# Patient Record
Sex: FEMALE | HISPANIC OR LATINO
[De-identification: names, ages, dates, MRNs, and addresses within clinical notes are randomized per-mention and may not be internally consistent; named-entity substitution may affect disease eponyms.]

---

## 2019-01-01 ENCOUNTER — EXTERNAL RECORD (OUTPATIENT)
Dept: HEALTH INFORMATION MANAGEMENT | Facility: OTHER | Age: 74
End: 2019-01-01

## 2019-07-10 ENCOUNTER — HOSPITAL (OUTPATIENT)
Dept: OTHER | Age: 74
End: 2019-07-10

## 2019-07-10 ENCOUNTER — EXTERNAL RECORD (OUTPATIENT)
Dept: CARDIOLOGY | Age: 74
End: 2019-07-10

## 2019-07-10 ENCOUNTER — DIAGNOSTIC TRANS (OUTPATIENT)
Dept: OTHER | Age: 74
End: 2019-07-10

## 2019-07-10 LAB
ALBUMIN SERPL-MCNC: 3.4 G/DL (ref 3.6–5.1)
ALP SERPL-CCNC: 119 UNITS/L (ref 45–117)
ALT SERPL-CCNC: 18 UNITS/L
AMORPH SED URNS QL MICRO: ABNORMAL
ANALYZER ANC (IANC): ABNORMAL
ANION GAP SERPL CALC-SCNC: 12 MMOL/L (ref 10–20)
APPEARANCE UR: ABNORMAL
APPEARANCE UR: CLEAR
AST SERPL-CCNC: 18 UNITS/L
BACTERIA #/AREA URNS HPF: ABNORMAL /HPF
BASOPHILS # BLD: 0 K/MCL (ref 0–0.3)
BASOPHILS NFR BLD: 1 %
BILIRUB CONJ SERPL-MCNC: 0.1 MG/DL (ref 0–0.2)
BILIRUB SERPL-MCNC: 0.2 MG/DL (ref 0.2–1)
BILIRUB UR QL STRIP: NEGATIVE
BILIRUB UR QL: NEGATIVE
BUN SERPL-MCNC: 24 MG/DL (ref 6–20)
BUN/CREAT SERPL: 36 (ref 7–25)
CALCIUM SERPL-MCNC: 8.9 MG/DL (ref 8.4–10.2)
CAOX CRY URNS QL MICRO: ABNORMAL
CHLORIDE SERPL-SCNC: 107 MMOL/L (ref 98–107)
CO2 SERPL-SCNC: 27 MMOL/L (ref 21–32)
COLOR UR: YELLOW
COLOR UR: YELLOW
CREAT SERPL-MCNC: 0.66 MG/DL (ref 0.51–0.95)
CRP SERPL-MCNC: 0.6 MG/DL
DIFFERENTIAL METHOD BLD: ABNORMAL
EOSINOPHIL # BLD: 0.1 K/MCL (ref 0.1–0.5)
EOSINOPHIL NFR BLD: 1 %
EPITH CASTS #/AREA URNS LPF: ABNORMAL /[LPF]
ERYTHROCYTE [DISTWIDTH] IN BLOOD: 17.9 % (ref 11–15)
ERYTHROCYTE [SEDIMENTATION RATE] IN BLOOD BY WESTERGREN METHOD: 35 MM/HR (ref 0–20)
FATTY CASTS #/AREA URNS LPF: ABNORMAL /[LPF]
FERRITIN SERPL-MCNC: 5 NG/ML (ref 8–252)
GLUCOSE SERPL-MCNC: 118 MG/DL (ref 65–99)
GLUCOSE UR STRIP-MCNC: NEGATIVE MG/DL
GLUCOSE UR-MCNC: NEGATIVE MG/DL
GRAN CASTS #/AREA URNS LPF: ABNORMAL /[LPF]
HCT VFR BLD CALC: 27.9 % (ref 36–46.5)
HEMOCCULT STL QL: NEGATIVE
HGB BLD-MCNC: 8 G/DL (ref 12–15.5)
HGB UR QL: NEGATIVE
HYALINE CASTS #/AREA URNS LPF: ABNORMAL /[LPF]
IMM GRANULOCYTES # BLD AUTO: 0 K/MCL (ref 0–0.2)
IMM GRANULOCYTES NFR BLD: 0 %
IMM RETICS NFR: 13.2 % (ref 1.5–16)
IRON SATN MFR SERPL: 3 % (ref 15–45)
IRON SERPL-MCNC: 13 MCG/DL (ref 50–170)
KETONES UR STRIP-MCNC: NEGATIVE MG/DL
KETONES UR-MCNC: NEGATIVE MG/DL
LEUKOCYTE ESTERASE UR QL STRIP: ABNORMAL
LEUKOCYTE ESTERASE UR QL STRIP: NEGATIVE
LYMPHOCYTES # BLD: 1.2 K/MCL (ref 1–4)
LYMPHOCYTES NFR BLD: 19 %
MCH RBC QN AUTO: 21.1 PG (ref 26–34)
MCHC RBC AUTO-ENTMCNC: 28.7 G/DL (ref 32–36.5)
MCV RBC AUTO: 73.6 FL (ref 78–100)
MICROSCOPIC (MT): ABNORMAL
MIXED CELL CASTS #/AREA URNS LPF: ABNORMAL /[LPF]
MONOCYTES # BLD: 0.4 K/MCL (ref 0.3–0.9)
MONOCYTES NFR BLD: 6 %
MUCOUS THREADS URNS QL MICRO: ABNORMAL
NEUTROPHILS # BLD: 4.6 K/MCL (ref 1.8–7.7)
NEUTROPHILS NFR BLD: 73 %
NEUTS SEG NFR BLD: ABNORMAL %
NITRITE UR QL STRIP: POSITIVE
NITRITE UR QL: ABNORMAL
NITRITE UR QL: POSITIVE
NRBC (NRBCRE): 0 /100 WBC
PH UR STRIP: 5.5 UNITS (ref 5–7)
PH UR: 5 UNITS (ref 5–7)
PLATELET # BLD: 265 K/MCL (ref 140–450)
POTASSIUM SERPL-SCNC: 4.2 MMOL/L (ref 3.4–5.1)
PROT SERPL-MCNC: 7.5 G/DL (ref 6.4–8.2)
PROT UR QL: NEGATIVE MG/DL
PROT UR STRIP-MCNC: NEGATIVE MG/DL
RBC # BLD: 3.79 MIL/MCL (ref 4–5.2)
RBC #/AREA URNS HPF: ABNORMAL /HPF (ref 0–2)
RBC CASTS #/AREA URNS LPF: ABNORMAL /[LPF]
RENAL EPI CELLS #/AREA URNS HPF: ABNORMAL /[HPF]
RETICS #: 60 K/MCL (ref 10–120)
RETICS/RBC NFR: 1.6 % (ref 0.3–2.5)
SODIUM SERPL-SCNC: 142 MMOL/L (ref 135–145)
SP GR UR STRIP: 1.02 (ref 1–1.03)
SP GR UR: 1.01 (ref 1–1.03)
SPECIMEN SOURCE: ABNORMAL
SPERM URNS QL MICRO: ABNORMAL
SQUAMOUS #/AREA URNS HPF: ABNORMAL /HPF (ref 0–5)
T VAGINALIS URNS QL MICRO: ABNORMAL
TIBC SERPL-MCNC: 391 MCG/DL (ref 250–450)
TRI-PHOS CRY URNS QL MICRO: ABNORMAL
TROPONIN I SERPL HS-MCNC: <0.02 NG/ML
URATE CRY URNS QL MICRO: ABNORMAL
URNS CMNT MICRO: ABNORMAL
UROBILINOGEN UR QL: 0.2 MG/DL (ref 0–1)
UROBILINOGEN UR STRIP-MCNC: 0.2 MG/DL (ref 0–1)
WAXY CASTS #/AREA URNS LPF: ABNORMAL /[LPF]
WBC # BLD: 6.2 K/MCL (ref 4.2–11)
WBC #/AREA URNS HPF: ABNORMAL /HPF (ref 0–5)
WBC CASTS #/AREA URNS LPF: ABNORMAL /[LPF]
YEAST HYPHAE URNS QL MICRO: ABNORMAL
YEAST URNS QL MICRO: ABNORMAL

## 2019-07-10 PROCEDURE — 99220 INITIAL OBSERVATION CARE,LEVL III: CPT | Performed by: INTERNAL MEDICINE

## 2019-07-10 PROCEDURE — 99285 EMERGENCY DEPT VISIT HI MDM: CPT | Performed by: EMERGENCY MEDICINE

## 2019-07-11 LAB
ANALYZER ANC (IANC): ABNORMAL
ANION GAP SERPL CALC-SCNC: 15 MMOL/L (ref 10–20)
BASOPHILS # BLD: 0 K/MCL (ref 0–0.3)
BASOPHILS NFR BLD: 0 %
BUN SERPL-MCNC: 18 MG/DL (ref 6–20)
BUN/CREAT SERPL: 38 (ref 7–25)
CALCIUM SERPL-MCNC: 8.4 MG/DL (ref 8.4–10.2)
CHLORIDE SERPL-SCNC: 108 MMOL/L (ref 98–107)
CO2 SERPL-SCNC: 23 MMOL/L (ref 21–32)
CREAT SERPL-MCNC: 0.48 MG/DL (ref 0.51–0.95)
DIFFERENTIAL METHOD BLD: ABNORMAL
EOSINOPHIL # BLD: 0.1 K/MCL (ref 0.1–0.5)
EOSINOPHIL NFR BLD: 1 %
ERYTHROCYTE [DISTWIDTH] IN BLOOD: 17.7 % (ref 11–15)
FOLATE SERPL-MCNC: 6.3 NG/ML
GLUCOSE SERPL-MCNC: 99 MG/DL (ref 65–99)
HCT VFR BLD CALC: 26.5 % (ref 36–46.5)
HGB BLD-MCNC: 7.6 G/DL (ref 12–15.5)
IMM GRANULOCYTES # BLD AUTO: 0 K/MCL (ref 0–0.2)
IMM GRANULOCYTES NFR BLD: 0 %
LYMPHOCYTES # BLD: 1.3 K/MCL (ref 1–4)
LYMPHOCYTES NFR BLD: 17 %
MCH RBC QN AUTO: 21.1 PG (ref 26–34)
MCHC RBC AUTO-ENTMCNC: 28.7 G/DL (ref 32–36.5)
MCV RBC AUTO: 73.4 FL (ref 78–100)
MONOCYTES # BLD: 0.5 K/MCL (ref 0.3–0.9)
MONOCYTES NFR BLD: 6 %
NEUTROPHILS # BLD: 5.7 K/MCL (ref 1.8–7.7)
NEUTROPHILS NFR BLD: 76 %
NEUTS SEG NFR BLD: ABNORMAL %
NRBC (NRBCRE): 0 /100 WBC
PLATELET # BLD: 232 K/MCL (ref 140–450)
POTASSIUM SERPL-SCNC: 3.8 MMOL/L (ref 3.4–5.1)
RBC # BLD: 3.61 MIL/MCL (ref 4–5.2)
SODIUM SERPL-SCNC: 142 MMOL/L (ref 135–145)
VIT B12 SERPL-MCNC: 313 PG/ML (ref 211–911)
WBC # BLD: 7.6 K/MCL (ref 4.2–11)

## 2019-07-11 PROCEDURE — 99233 SBSQ HOSP IP/OBS HIGH 50: CPT | Performed by: INTERNAL MEDICINE

## 2019-07-12 LAB
ANALYZER ANC (IANC): ABNORMAL
ANION GAP SERPL CALC-SCNC: 13 MMOL/L (ref 10–20)
BASOPHILS # BLD: 0 K/MCL (ref 0–0.3)
BASOPHILS NFR BLD: 0 %
BUN SERPL-MCNC: 20 MG/DL (ref 6–20)
BUN/CREAT SERPL: 31 (ref 7–25)
CALCIUM SERPL-MCNC: 8.7 MG/DL (ref 8.4–10.2)
CHLORIDE SERPL-SCNC: 104 MMOL/L (ref 98–107)
CO2 SERPL-SCNC: 27 MMOL/L (ref 21–32)
CREAT SERPL-MCNC: 0.64 MG/DL (ref 0.51–0.95)
DIFFERENTIAL METHOD BLD: ABNORMAL
EOSINOPHIL # BLD: 0.1 K/MCL (ref 0.1–0.5)
EOSINOPHIL NFR BLD: 1 %
ERYTHROCYTE [DISTWIDTH] IN BLOOD: 17.8 % (ref 11–15)
GLUCOSE SERPL-MCNC: 103 MG/DL (ref 65–99)
HCT VFR BLD CALC: 26.9 % (ref 36–46.5)
HGB BLD-MCNC: 7.6 G/DL (ref 12–15.5)
IMM GRANULOCYTES # BLD AUTO: 0 K/MCL (ref 0–0.2)
IMM GRANULOCYTES NFR BLD: 0 %
LYMPHOCYTES # BLD: 1.7 K/MCL (ref 1–4)
LYMPHOCYTES NFR BLD: 24 %
MCH RBC QN AUTO: 21.2 PG (ref 26–34)
MCHC RBC AUTO-ENTMCNC: 28.3 G/DL (ref 32–36.5)
MCV RBC AUTO: 74.9 FL (ref 78–100)
MONOCYTES # BLD: 0.6 K/MCL (ref 0.3–0.9)
MONOCYTES NFR BLD: 9 %
NEUTROPHILS # BLD: 4.6 K/MCL (ref 1.8–7.7)
NEUTROPHILS NFR BLD: 66 %
NEUTS SEG NFR BLD: ABNORMAL %
NRBC (NRBCRE): 0 /100 WBC
PLATELET # BLD: 232 K/MCL (ref 140–450)
POTASSIUM SERPL-SCNC: 4.2 MMOL/L (ref 3.4–5.1)
RBC # BLD: 3.59 MIL/MCL (ref 4–5.2)
SODIUM SERPL-SCNC: 140 MMOL/L (ref 135–145)
WBC # BLD: 7 K/MCL (ref 4.2–11)

## 2019-07-12 PROCEDURE — 99233 SBSQ HOSP IP/OBS HIGH 50: CPT | Performed by: INTERNAL MEDICINE

## 2019-07-13 LAB
BACTERIA UR CULT: ABNORMAL
ORGANISM: ABNORMAL

## 2019-07-13 PROCEDURE — 99232 SBSQ HOSP IP/OBS MODERATE 35: CPT | Performed by: INTERNAL MEDICINE

## 2019-07-14 PROCEDURE — 99239 HOSP IP/OBS DSCHRG MGMT >30: CPT | Performed by: INTERNAL MEDICINE

## 2019-07-15 LAB
ANER/AEROBE CUL/SMR (AANC) HL: ABNORMAL
ORGANISM: ABNORMAL
ORGANISM: ABNORMAL

## 2019-07-23 ENCOUNTER — EXTERNAL RECORD (OUTPATIENT)
Dept: HEALTH INFORMATION MANAGEMENT | Facility: OTHER | Age: 74
End: 2019-07-23

## 2019-07-30 ENCOUNTER — EXTERNAL RECORD (OUTPATIENT)
Dept: HEALTH INFORMATION MANAGEMENT | Facility: OTHER | Age: 74
End: 2019-07-30

## 2019-08-05 ENCOUNTER — OFFICE VISIT (OUTPATIENT)
Dept: WOUND CARE | Age: 74
End: 2019-08-05

## 2019-08-05 ENCOUNTER — HOSPITAL (OUTPATIENT)
Dept: OTHER | Age: 74
End: 2019-08-05
Attending: SURGERY

## 2019-08-05 DIAGNOSIS — S81.801D OPEN WOUND OF RIGHT LOWER EXTREMITY, SUBSEQUENT ENCOUNTER: ICD-10-CM

## 2019-08-05 DIAGNOSIS — I73.9 PERIPHERAL ARTERIAL DISEASE (CMD): Primary | ICD-10-CM

## 2019-08-05 PROCEDURE — 99203 OFFICE O/P NEW LOW 30 MIN: CPT | Performed by: SURGERY

## 2019-08-08 ENCOUNTER — OFFICE VISIT (OUTPATIENT)
Dept: CARDIOLOGY | Age: 74
End: 2019-08-08

## 2019-08-08 VITALS
HEIGHT: 59 IN | HEART RATE: 51 BPM | SYSTOLIC BLOOD PRESSURE: 134 MMHG | BODY MASS INDEX: 28.89 KG/M2 | DIASTOLIC BLOOD PRESSURE: 60 MMHG | WEIGHT: 143.3 LBS

## 2019-08-08 DIAGNOSIS — R07.89 ATYPICAL CHEST PAIN: Primary | ICD-10-CM

## 2019-08-08 DIAGNOSIS — I73.9 PAD (PERIPHERAL ARTERY DISEASE) (CMD): ICD-10-CM

## 2019-08-08 DIAGNOSIS — E78.2 MIXED HYPERLIPIDEMIA: ICD-10-CM

## 2019-08-08 DIAGNOSIS — I10 ESSENTIAL HYPERTENSION: ICD-10-CM

## 2019-08-08 PROBLEM — Z86.73 H/O: CVA (CEREBROVASCULAR ACCIDENT): Status: ACTIVE | Noted: 2019-08-08

## 2019-08-08 PROCEDURE — 99203 OFFICE O/P NEW LOW 30 MIN: CPT | Performed by: INTERNAL MEDICINE

## 2019-08-08 PROCEDURE — 3078F DIAST BP <80 MM HG: CPT | Performed by: INTERNAL MEDICINE

## 2019-08-08 PROCEDURE — 3075F SYST BP GE 130 - 139MM HG: CPT | Performed by: INTERNAL MEDICINE

## 2019-08-08 SDOH — HEALTH STABILITY: MENTAL HEALTH: HOW OFTEN DO YOU HAVE A DRINK CONTAINING ALCOHOL?: NEVER

## 2019-08-19 ENCOUNTER — OFFICE VISIT (OUTPATIENT)
Dept: WOUND CARE | Age: 74
End: 2019-08-19

## 2019-08-19 DIAGNOSIS — I73.9 PERIPHERAL ARTERIAL DISEASE (CMD): Primary | ICD-10-CM

## 2019-08-19 PROCEDURE — 99213 OFFICE O/P EST LOW 20 MIN: CPT | Performed by: SURGERY

## 2019-08-19 RX ORDER — TRAMADOL HYDROCHLORIDE 50 MG/1
50 TABLET ORAL EVERY 6 HOURS PRN
Status: SHIPPED | OUTPATIENT
Start: 2019-08-19 | End: 2019-09-02

## 2019-08-21 ENCOUNTER — OFFICE VISIT (OUTPATIENT)
Dept: WOUND CARE | Age: 74
End: 2019-08-21

## 2019-08-21 DIAGNOSIS — S81.801D OPEN WOUND OF RIGHT LOWER LEG, SUBSEQUENT ENCOUNTER: Primary | ICD-10-CM

## 2019-08-21 DIAGNOSIS — I73.9 PAD (PERIPHERAL ARTERY DISEASE) (CMD): ICD-10-CM

## 2019-08-21 PROBLEM — S81.801A OPEN WOUND OF RIGHT LOWER LEG: Status: ACTIVE | Noted: 2019-08-21

## 2019-08-21 PROCEDURE — X1094 NO CHARGE VISIT: HCPCS | Performed by: PHYSICAL THERAPIST

## 2019-08-23 ENCOUNTER — TELEPHONE (OUTPATIENT)
Dept: SURGERY | Age: 74
End: 2019-08-23

## 2019-08-26 ENCOUNTER — TELEPHONE (OUTPATIENT)
Dept: CARDIOLOGY | Age: 74
End: 2019-08-26

## 2019-08-26 ENCOUNTER — OFFICE VISIT (OUTPATIENT)
Dept: WOUND CARE | Age: 74
End: 2019-08-26

## 2019-08-26 DIAGNOSIS — S81.801D OPEN WOUND OF RIGHT LOWER EXTREMITY, SUBSEQUENT ENCOUNTER: ICD-10-CM

## 2019-08-26 DIAGNOSIS — I73.9 PERIPHERAL ARTERIAL DISEASE (CMD): Primary | ICD-10-CM

## 2019-08-26 PROCEDURE — 99213 OFFICE O/P EST LOW 20 MIN: CPT | Performed by: SURGERY

## 2019-08-27 ENCOUNTER — HOSPITAL (OUTPATIENT)
Dept: OTHER | Age: 74
End: 2019-08-27

## 2019-08-27 ENCOUNTER — DIAGNOSTIC TRANS (OUTPATIENT)
Dept: OTHER | Age: 74
End: 2019-08-27

## 2019-08-27 LAB
ANION GAP SERPL CALC-SCNC: 14 MMOL/L (ref 10–20)
APTT PPP: 117 SEC (ref 22–32)
APTT PPP: 30 SEC (ref 22–32)
APTT PPP: ABNORMAL S
APTT PPP: NORMAL S
APTT PPP: NORMAL S
BUN SERPL-MCNC: 15 MG/DL (ref 6–20)
BUN/CREAT SERPL: 33 (ref 7–25)
CALCIUM SERPL-MCNC: 8.5 MG/DL (ref 8.4–10.2)
CHLORIDE SERPL-SCNC: 107 MMOL/L (ref 98–107)
CO2 SERPL-SCNC: 24 MMOL/L (ref 21–32)
CREAT SERPL-MCNC: 0.45 MG/DL (ref 0.51–0.95)
GLUCOSE SERPL-MCNC: 121 MG/DL (ref 65–99)
HCT VFR BLD CALC: 22.3 % (ref 36–46.5)
HCT VFR BLD CALC: 26.6 % (ref 36–46.5)
HCT VFR BLD CALC: 29.3 % (ref 36–46.5)
HCT VFR BLD CALC: 38.2 % (ref 36–46.5)
HGB BLD-MCNC: 12.1 G/DL (ref 12–15.5)
HGB BLD-MCNC: 6.7 G/DL (ref 12–15.5)
HGB BLD-MCNC: 8.1 G/DL (ref 12–15.5)
HGB BLD-MCNC: 9 G/DL (ref 12–15.5)
HGB BLD-MCNC: ABNORMAL G/DL
HGB BLD-MCNC: ABNORMAL G/DL
INR PPP: 1.1
INR PPP: 1.2
INR PPP: ABNORMAL
INR PPP: NORMAL
POTASSIUM SERPL-SCNC: 4.7 MMOL/L (ref 3.4–5.1)
POTASSIUM SERPL-SCNC: ABNORMAL MMOL/L
PROTHROMBIN TIME (PRT2): ABNORMAL
PROTHROMBIN TIME (PRT2): NORMAL
PROTHROMBIN TIME: 11.4 SEC (ref 9.7–11.8)
PROTHROMBIN TIME: 12.2 SEC (ref 9.7–11.8)
SODIUM SERPL-SCNC: 140 MMOL/L (ref 135–145)
TROPONIN I SERPL HS-MCNC: 0.03 NG/ML
TROPONIN I SERPL HS-MCNC: 0.03 NG/ML

## 2019-08-27 PROCEDURE — 11043 DBRDMT MUSC&/FSCA 1ST 20/<: CPT | Performed by: SURGERY

## 2019-08-27 PROCEDURE — 37232 REVASCULARIZATION ENDOVASC TIBIAL/PERONEAL UNIL EA ADDL VESSEL W ANGIO: CPT | Performed by: SURGERY

## 2019-08-27 PROCEDURE — 99222 1ST HOSP IP/OBS MODERATE 55: CPT | Performed by: INTERNAL MEDICINE

## 2019-08-27 PROCEDURE — 75710 ARTERY X-RAYS ARM/LEG: CPT | Performed by: SURGERY

## 2019-08-27 PROCEDURE — 75625 CONTRAST EXAM ABDOMINL AORTA: CPT | Performed by: SURGERY

## 2019-08-27 PROCEDURE — 99223 1ST HOSP IP/OBS HIGH 75: CPT | Performed by: SURGERY

## 2019-08-27 PROCEDURE — 37228 REVASCULARIZATION ENDOVASC TIBIAL/PERONEAL UNIL W ANGIOPLASTY: CPT | Performed by: SURGERY

## 2019-08-28 ENCOUNTER — MED INFO FORMS (OUTPATIENT)
Dept: CARDIOLOGY | Age: 74
End: 2019-08-28

## 2019-08-28 LAB
ANALYZER ANC (IANC): ABNORMAL
ANION GAP SERPL CALC-SCNC: 14 MMOL/L (ref 10–20)
BASOPHILS # BLD: 0 K/MCL (ref 0–0.3)
BASOPHILS NFR BLD: 0 %
BUN SERPL-MCNC: 18 MG/DL (ref 6–20)
BUN/CREAT SERPL: 37 (ref 7–25)
CALCIUM SERPL-MCNC: 8.4 MG/DL (ref 8.4–10.2)
CHLORIDE SERPL-SCNC: 109 MMOL/L (ref 98–107)
CO2 SERPL-SCNC: 22 MMOL/L (ref 21–32)
CREAT SERPL-MCNC: 0.49 MG/DL (ref 0.51–0.95)
DIFFERENTIAL METHOD BLD: ABNORMAL
EOSINOPHIL # BLD: 0 K/MCL (ref 0.1–0.5)
EOSINOPHIL NFR BLD: 0 %
ERYTHROCYTE [DISTWIDTH] IN BLOOD: 19.7 % (ref 11–15)
GLUCOSE SERPL-MCNC: 130 MG/DL (ref 65–99)
HCT VFR BLD CALC: 28.9 % (ref 36–46.5)
HGB BLD-MCNC: 9.5 G/DL (ref 12–15.5)
IMM GRANULOCYTES # BLD AUTO: 0 K/MCL (ref 0–0.2)
IMM GRANULOCYTES NFR BLD: 0 %
LYMPHOCYTES # BLD: 1.4 K/MCL (ref 1–4)
LYMPHOCYTES NFR BLD: 12 %
MCH RBC QN AUTO: 28.1 PG (ref 26–34)
MCHC RBC AUTO-ENTMCNC: 32.9 G/DL (ref 32–36.5)
MCV RBC AUTO: 85.5 FL (ref 78–100)
MONOCYTES # BLD: 0.9 K/MCL (ref 0.3–0.9)
MONOCYTES NFR BLD: 8 %
MRSA DNA SPEC QL NAA+PROBE: NORMAL
MRSA DNA SPEC QL NAA+PROBE: NORMAL
MRSA DNA SPEC QL NAA+PROBE: NOT DETECTED
NEUTROPHILS # BLD: 9.3 K/MCL (ref 1.8–7.7)
NEUTROPHILS NFR BLD: 80 %
NEUTS SEG NFR BLD: ABNORMAL %
NRBC (NRBCRE): 0 /100 WBC
PLATELET # BLD: 188 K/MCL (ref 140–450)
POTASSIUM SERPL-SCNC: 4.3 MMOL/L (ref 3.4–5.1)
RBC # BLD: 3.38 MIL/MCL (ref 4–5.2)
SODIUM SERPL-SCNC: 141 MMOL/L (ref 135–145)
SPECIMEN SOURCE: NORMAL
TROPONIN I SERPL HS-MCNC: 0.04 NG/ML
WBC # BLD: 11.5 K/MCL (ref 4.2–11)

## 2019-08-28 PROCEDURE — 99232 SBSQ HOSP IP/OBS MODERATE 35: CPT | Performed by: SURGERY

## 2019-08-28 PROCEDURE — 99231 SBSQ HOSP IP/OBS SF/LOW 25: CPT | Performed by: INTERNAL MEDICINE

## 2019-08-29 LAB
ANALYZER ANC (IANC): ABNORMAL
ANION GAP SERPL CALC-SCNC: 14 MMOL/L (ref 10–20)
BASOPHILS # BLD: 0 K/MCL (ref 0–0.3)
BASOPHILS NFR BLD: 0 %
BUN SERPL-MCNC: 13 MG/DL (ref 6–20)
BUN/CREAT SERPL: 29 (ref 7–25)
CALCIUM SERPL-MCNC: 8.6 MG/DL (ref 8.4–10.2)
CHLORIDE SERPL-SCNC: 106 MMOL/L (ref 98–107)
CO2 SERPL-SCNC: 24 MMOL/L (ref 21–32)
CREAT SERPL-MCNC: 0.45 MG/DL (ref 0.51–0.95)
DIFFERENTIAL METHOD BLD: ABNORMAL
EOSINOPHIL # BLD: 0.1 K/MCL (ref 0.1–0.5)
EOSINOPHIL NFR BLD: 1 %
ERYTHROCYTE [DISTWIDTH] IN BLOOD: 20.1 % (ref 11–15)
GLUCOSE SERPL-MCNC: 172 MG/DL (ref 65–99)
HCT VFR BLD CALC: 27.2 % (ref 36–46.5)
HGB BLD-MCNC: 8.9 G/DL (ref 12–15.5)
IMM GRANULOCYTES # BLD AUTO: 0.1 K/MCL (ref 0–0.2)
IMM GRANULOCYTES NFR BLD: 0 %
LYMPHOCYTES # BLD: 1.1 K/MCL (ref 1–4)
LYMPHOCYTES NFR BLD: 10 %
MAGNESIUM SERPL-MCNC: 2 MG/DL (ref 1.7–2.4)
MCH RBC QN AUTO: 27.8 PG (ref 26–34)
MCHC RBC AUTO-ENTMCNC: 32.7 G/DL (ref 32–36.5)
MCV RBC AUTO: 85 FL (ref 78–100)
MONOCYTES # BLD: 0.8 K/MCL (ref 0.3–0.9)
MONOCYTES NFR BLD: 7 %
NEUTROPHILS # BLD: 9.4 K/MCL (ref 1.8–7.7)
NEUTROPHILS NFR BLD: 82 %
NEUTS SEG NFR BLD: ABNORMAL %
NRBC (NRBCRE): 0 /100 WBC
PHOSPHATE SERPL-MCNC: 2.5 MG/DL (ref 2.4–4.7)
PLATELET # BLD: 195 K/MCL (ref 140–450)
POTASSIUM SERPL-SCNC: 3.7 MMOL/L (ref 3.4–5.1)
RBC # BLD: 3.2 MIL/MCL (ref 4–5.2)
SODIUM SERPL-SCNC: 140 MMOL/L (ref 135–145)
WBC # BLD: 11.4 K/MCL (ref 4.2–11)

## 2019-08-29 PROCEDURE — 99232 SBSQ HOSP IP/OBS MODERATE 35: CPT | Performed by: SURGERY

## 2019-09-01 ENCOUNTER — HOSPITAL (OUTPATIENT)
Dept: OTHER | Age: 74
End: 2019-09-01
Attending: SURGERY

## 2019-09-09 ENCOUNTER — OFFICE VISIT (OUTPATIENT)
Dept: WOUND CARE | Age: 74
End: 2019-09-09

## 2019-09-09 DIAGNOSIS — I73.9 PERIPHERAL ARTERIAL DISEASE (CMD): Primary | ICD-10-CM

## 2019-09-09 DIAGNOSIS — S81.801D OPEN WOUND OF RIGHT LOWER EXTREMITY, SUBSEQUENT ENCOUNTER: ICD-10-CM

## 2019-09-09 PROCEDURE — 99213 OFFICE O/P EST LOW 20 MIN: CPT | Performed by: SURGERY

## 2019-09-11 ENCOUNTER — HOSPITAL (OUTPATIENT)
Dept: OTHER | Age: 74
End: 2019-09-11
Attending: SURGERY

## 2019-09-13 ENCOUNTER — APPOINTMENT (OUTPATIENT)
Dept: CARDIOLOGY | Age: 74
End: 2019-09-13
Attending: INTERNAL MEDICINE

## 2019-09-17 ENCOUNTER — TELEPHONE (OUTPATIENT)
Dept: SURGERY | Age: 74
End: 2019-09-17

## 2019-09-25 ENCOUNTER — TELEPHONE (OUTPATIENT)
Dept: CARDIOLOGY | Age: 74
End: 2019-09-25

## 2019-09-26 ENCOUNTER — ANCILLARY PROCEDURE (OUTPATIENT)
Dept: CARDIOLOGY | Age: 74
End: 2019-09-26
Attending: INTERNAL MEDICINE

## 2019-09-26 VITALS — BODY MASS INDEX: 30.02 KG/M2 | HEIGHT: 58 IN | WEIGHT: 143 LBS

## 2019-09-26 DIAGNOSIS — R07.89 ATYPICAL CHEST PAIN: ICD-10-CM

## 2019-09-26 PROCEDURE — 93306 TTE W/DOPPLER COMPLETE: CPT | Performed by: INTERNAL MEDICINE

## 2019-09-26 PROCEDURE — 78452 HT MUSCLE IMAGE SPECT MULT: CPT | Performed by: INTERNAL MEDICINE

## 2019-09-26 PROCEDURE — 93015 CV STRESS TEST SUPVJ I&R: CPT | Performed by: INTERNAL MEDICINE

## 2019-09-26 PROCEDURE — A9502 TC99M TETROFOSMIN: HCPCS | Performed by: INTERNAL MEDICINE

## 2019-09-27 LAB — STRESS TARGET HR: 145 BPM

## 2019-10-01 ENCOUNTER — OFFICE VISIT (OUTPATIENT)
Dept: CARDIOLOGY | Age: 74
End: 2019-10-01

## 2019-10-01 ENCOUNTER — HOSPITAL (OUTPATIENT)
Dept: OTHER | Age: 74
End: 2019-10-01
Attending: SURGERY

## 2019-10-01 VITALS
BODY MASS INDEX: 29.71 KG/M2 | WEIGHT: 141.54 LBS | TEMPERATURE: 96.4 F | SYSTOLIC BLOOD PRESSURE: 118 MMHG | OXYGEN SATURATION: 99 % | DIASTOLIC BLOOD PRESSURE: 52 MMHG | HEIGHT: 58 IN | HEART RATE: 54 BPM

## 2019-10-01 DIAGNOSIS — I73.9 PAD (PERIPHERAL ARTERY DISEASE) (CMD): ICD-10-CM

## 2019-10-01 DIAGNOSIS — R94.39 ABNORMAL CARDIOVASCULAR STRESS TEST: Primary | ICD-10-CM

## 2019-10-01 DIAGNOSIS — R07.89 ATYPICAL CHEST PAIN: ICD-10-CM

## 2019-10-01 DIAGNOSIS — E78.2 MIXED HYPERLIPIDEMIA: ICD-10-CM

## 2019-10-01 DIAGNOSIS — I10 ESSENTIAL HYPERTENSION: ICD-10-CM

## 2019-10-01 PROCEDURE — 3074F SYST BP LT 130 MM HG: CPT | Performed by: INTERNAL MEDICINE

## 2019-10-01 PROCEDURE — 99214 OFFICE O/P EST MOD 30 MIN: CPT | Performed by: INTERNAL MEDICINE

## 2019-10-01 PROCEDURE — 3078F DIAST BP <80 MM HG: CPT | Performed by: INTERNAL MEDICINE

## 2019-10-01 ASSESSMENT — PATIENT HEALTH QUESTIONNAIRE - PHQ9
SUM OF ALL RESPONSES TO PHQ QUESTIONS 1-9: 6
SUM OF ALL RESPONSES TO PHQ9 QUESTIONS 1 AND 2: 3
SUM OF ALL RESPONSES TO PHQ9 QUESTIONS 1 AND 2: 3
SUM OF ALL RESPONSES TO PHQ9 QUESTIONS 1 TO 9: 6
9. THOUGHTS THAT YOU WOULD BE BETTER OFF DEAD, OR OF HURTING YOURSELF: NOT AT ALL
6. FEELING BAD ABOUT YOURSELF - OR THAT YOU ARE A FAILURE OR HAVE LET YOURSELF OR YOUR FAMILY DOWN: NOT AT ALL
2. FEELING DOWN, DEPRESSED OR HOPELESS: NEARLY EVERY DAY
1. LITTLE INTEREST OR PLEASURE IN DOING THINGS: NOT AT ALL
4. FEELING TIRED OR HAVING LITTLE ENERGY: NEARLY EVERY DAY
CLINICAL INTERPRETATION OF PHQ9 SCORE: MILD DEPRESSION
7. TROUBLE CONCENTRATING ON THINGS, SUCH AS READING THE NEWSPAPER OR WATCHING TELEVISION: NOT AT ALL
3. TROUBLE FALLING OR STAYING ASLEEP OR SLEEPING TOO MUCH: NOT AT ALL
10. IF YOU CHECKED OFF ANY PROBLEMS, HOW DIFFICULT HAVE THESE PROBLEMS MADE IT FOR YOU TO DO YOUR WORK, TAKE CARE OF THINGS AT HOME, OR GET ALONG WITH OTHER PEOPLE: NOT DIFFICULT AT ALL
5. POOR APPETITE, WEIGHT LOSS, OR OVEREATING: NOT AT ALL
8. MOVING OR SPEAKING SO SLOWLY THAT OTHER PEOPLE COULD HAVE NOTICED. OR THE OPPOSITE, BEING SO FIGETY OR RESTLESS THAT YOU HAVE BEEN MOVING AROUND A LOT MORE THAN USUAL: NOT AT ALL

## 2019-10-09 ENCOUNTER — HOSPITAL (OUTPATIENT)
Dept: OTHER | Age: 74
End: 2019-10-09
Attending: INTERNAL MEDICINE

## 2019-11-01 ENCOUNTER — TELEPHONE (OUTPATIENT)
Dept: CARDIOLOGY | Age: 74
End: 2019-11-01

## 2019-11-02 ENCOUNTER — APPOINTMENT (OUTPATIENT)
Dept: GENERAL RADIOLOGY | Facility: HOSPITAL | Age: 74
DRG: 854 | End: 2019-11-02
Attending: EMERGENCY MEDICINE
Payer: MEDICARE

## 2019-11-02 ENCOUNTER — HOSPITAL ENCOUNTER (INPATIENT)
Facility: HOSPITAL | Age: 74
LOS: 25 days | Discharge: NURSING, REHAB OR LONG-TERM CARE | DRG: 854 | End: 2019-11-27
Attending: EMERGENCY MEDICINE | Admitting: HOSPITALIST
Payer: MEDICARE

## 2019-11-02 ENCOUNTER — APPOINTMENT (OUTPATIENT)
Dept: ULTRASOUND IMAGING | Facility: HOSPITAL | Age: 74
DRG: 854 | End: 2019-11-02
Attending: EMERGENCY MEDICINE
Payer: MEDICARE

## 2019-11-02 DIAGNOSIS — K92.1 HEMATOCHEZIA: ICD-10-CM

## 2019-11-02 DIAGNOSIS — I73.9 PAD (PERIPHERAL ARTERY DISEASE) (HCC): ICD-10-CM

## 2019-11-02 DIAGNOSIS — E87.1 HYPONATREMIA: ICD-10-CM

## 2019-11-02 DIAGNOSIS — Z87.898 HISTORY OF ULCERATION: ICD-10-CM

## 2019-11-02 DIAGNOSIS — I73.9 PVD (PERIPHERAL VASCULAR DISEASE) (HCC): ICD-10-CM

## 2019-11-02 DIAGNOSIS — Z47.89 ORTHOPEDIC AFTERCARE: ICD-10-CM

## 2019-11-02 DIAGNOSIS — S81.802A OPEN WOUND OF LEFT LOWER EXTREMITY, INITIAL ENCOUNTER: ICD-10-CM

## 2019-11-02 DIAGNOSIS — N17.9 AKI (ACUTE KIDNEY INJURY) (HCC): ICD-10-CM

## 2019-11-02 DIAGNOSIS — L03.115 CELLULITIS OF RIGHT LOWER EXTREMITY: Primary | ICD-10-CM

## 2019-11-02 DIAGNOSIS — D50.9 IRON DEFICIENCY ANEMIA, UNSPECIFIED IRON DEFICIENCY ANEMIA TYPE: ICD-10-CM

## 2019-11-02 DIAGNOSIS — S81.801A OPEN WOUND OF RIGHT LOWER EXTREMITY, INITIAL ENCOUNTER: ICD-10-CM

## 2019-11-02 PROCEDURE — 99223 1ST HOSP IP/OBS HIGH 75: CPT | Performed by: HOSPITALIST

## 2019-11-02 PROCEDURE — 93970 EXTREMITY STUDY: CPT | Performed by: EMERGENCY MEDICINE

## 2019-11-02 PROCEDURE — 73590 X-RAY EXAM OF LOWER LEG: CPT | Performed by: EMERGENCY MEDICINE

## 2019-11-02 RX ORDER — TRAMADOL HYDROCHLORIDE 50 MG/1
50 TABLET ORAL EVERY 6 HOURS PRN
Status: ON HOLD | COMMUNITY
End: 2019-11-18

## 2019-11-02 RX ORDER — SODIUM CHLORIDE 9 MG/ML
INJECTION, SOLUTION INTRAVENOUS CONTINUOUS
Status: ACTIVE | OUTPATIENT
Start: 2019-11-02 | End: 2019-11-03

## 2019-11-02 RX ORDER — LISINOPRIL 10 MG/1
10 TABLET ORAL DAILY
Status: ON HOLD | COMMUNITY
End: 2019-11-22

## 2019-11-02 RX ORDER — HYDROCHLOROTHIAZIDE 25 MG/1
25 TABLET ORAL DAILY
Status: ON HOLD | COMMUNITY
End: 2019-11-26

## 2019-11-02 RX ORDER — ONDANSETRON 2 MG/ML
4 INJECTION INTRAMUSCULAR; INTRAVENOUS ONCE
Status: COMPLETED | OUTPATIENT
Start: 2019-11-02 | End: 2019-11-02

## 2019-11-02 RX ORDER — ONDANSETRON 2 MG/ML
4 INJECTION INTRAMUSCULAR; INTRAVENOUS EVERY 4 HOURS PRN
Status: DISCONTINUED | OUTPATIENT
Start: 2019-11-02 | End: 2019-11-03

## 2019-11-02 RX ORDER — HYDROMORPHONE HYDROCHLORIDE 1 MG/ML
0.5 INJECTION, SOLUTION INTRAMUSCULAR; INTRAVENOUS; SUBCUTANEOUS EVERY 30 MIN PRN
Status: DISCONTINUED | OUTPATIENT
Start: 2019-11-02 | End: 2019-11-03

## 2019-11-02 RX ORDER — MELATONIN
325
COMMUNITY

## 2019-11-02 RX ORDER — CLOPIDOGREL BISULFATE 75 MG/1
75 TABLET ORAL DAILY
COMMUNITY

## 2019-11-02 RX ORDER — CEFAZOLIN SODIUM/WATER 2 G/20 ML
2 SYRINGE (ML) INTRAVENOUS ONCE
Status: COMPLETED | OUTPATIENT
Start: 2019-11-02 | End: 2019-11-02

## 2019-11-02 RX ORDER — ACETAMINOPHEN 325 MG/1
650 TABLET ORAL EVERY 6 HOURS PRN
COMMUNITY

## 2019-11-02 RX ORDER — HYDROMORPHONE HYDROCHLORIDE 1 MG/ML
0.5 INJECTION, SOLUTION INTRAMUSCULAR; INTRAVENOUS; SUBCUTANEOUS EVERY 30 MIN PRN
Status: COMPLETED | OUTPATIENT
Start: 2019-11-02 | End: 2019-11-03

## 2019-11-02 NOTE — ED INITIAL ASSESSMENT (HPI)
Pt here today for wounds to bilateral lower extremities. Pts family states pt has poor circulation and that is why she has developed the wounds. Green drainage noted from R leg anterior wound.  Pt ambulated from wheelchair to exam cart with assist.

## 2019-11-03 ENCOUNTER — APPOINTMENT (OUTPATIENT)
Dept: CV DIAGNOSTICS | Facility: HOSPITAL | Age: 74
DRG: 854 | End: 2019-11-03
Attending: HOSPITALIST
Payer: MEDICARE

## 2019-11-03 PROBLEM — I73.9 PAD (PERIPHERAL ARTERY DISEASE) (HCC): Status: ACTIVE | Noted: 2019-11-02

## 2019-11-03 PROCEDURE — 93306 TTE W/DOPPLER COMPLETE: CPT | Performed by: HOSPITALIST

## 2019-11-03 PROCEDURE — 99232 SBSQ HOSP IP/OBS MODERATE 35: CPT | Performed by: HOSPITALIST

## 2019-11-03 PROCEDURE — 99223 1ST HOSP IP/OBS HIGH 75: CPT | Performed by: INTERNAL MEDICINE

## 2019-11-03 RX ORDER — HYDROMORPHONE HYDROCHLORIDE 1 MG/ML
0.4 INJECTION, SOLUTION INTRAMUSCULAR; INTRAVENOUS; SUBCUTANEOUS EVERY 2 HOUR PRN
Status: DISCONTINUED | OUTPATIENT
Start: 2019-11-03 | End: 2019-11-27

## 2019-11-03 RX ORDER — ONDANSETRON 2 MG/ML
4 INJECTION INTRAMUSCULAR; INTRAVENOUS EVERY 6 HOURS PRN
Status: DISCONTINUED | OUTPATIENT
Start: 2019-11-03 | End: 2019-11-27

## 2019-11-03 RX ORDER — HYDROCODONE BITARTRATE AND ACETAMINOPHEN 10; 325 MG/1; MG/1
1 TABLET ORAL EVERY 6 HOURS PRN
Status: DISCONTINUED | OUTPATIENT
Start: 2019-11-03 | End: 2019-11-09

## 2019-11-03 RX ORDER — HEPARIN SODIUM 5000 [USP'U]/ML
INJECTION, SOLUTION INTRAVENOUS; SUBCUTANEOUS
Status: DISPENSED
Start: 2019-11-03 | End: 2019-11-03

## 2019-11-03 RX ORDER — SODIUM CHLORIDE 9 MG/ML
INJECTION, SOLUTION INTRAVENOUS CONTINUOUS
Status: DISCONTINUED | OUTPATIENT
Start: 2019-11-03 | End: 2019-11-04

## 2019-11-03 RX ORDER — HEPARIN SODIUM AND DEXTROSE 10000; 5 [USP'U]/100ML; G/100ML
12 INJECTION INTRAVENOUS ONCE
Status: COMPLETED | OUTPATIENT
Start: 2019-11-03 | End: 2019-11-03

## 2019-11-03 RX ORDER — POTASSIUM CHLORIDE 14.9 MG/ML
20 INJECTION INTRAVENOUS ONCE
Status: COMPLETED | OUTPATIENT
Start: 2019-11-03 | End: 2019-11-03

## 2019-11-03 RX ORDER — HEPARIN SODIUM AND DEXTROSE 10000; 5 [USP'U]/100ML; G/100ML
INJECTION INTRAVENOUS CONTINUOUS
Status: DISCONTINUED | OUTPATIENT
Start: 2019-11-03 | End: 2019-11-04

## 2019-11-03 RX ORDER — HYDROCHLOROTHIAZIDE 25 MG/1
25 TABLET ORAL DAILY
Status: DISCONTINUED | OUTPATIENT
Start: 2019-11-03 | End: 2019-11-04

## 2019-11-03 RX ORDER — HYDROMORPHONE HYDROCHLORIDE 1 MG/ML
0.8 INJECTION, SOLUTION INTRAMUSCULAR; INTRAVENOUS; SUBCUTANEOUS EVERY 2 HOUR PRN
Status: DISCONTINUED | OUTPATIENT
Start: 2019-11-03 | End: 2019-11-27

## 2019-11-03 RX ORDER — CLOPIDOGREL BISULFATE 75 MG/1
75 TABLET ORAL DAILY
Status: DISCONTINUED | OUTPATIENT
Start: 2019-11-03 | End: 2019-11-27

## 2019-11-03 RX ORDER — ASPIRIN 325 MG
325 TABLET ORAL DAILY
Status: DISCONTINUED | OUTPATIENT
Start: 2019-11-03 | End: 2019-11-08

## 2019-11-03 RX ORDER — HYDROMORPHONE HYDROCHLORIDE 1 MG/ML
0.2 INJECTION, SOLUTION INTRAMUSCULAR; INTRAVENOUS; SUBCUTANEOUS EVERY 2 HOUR PRN
Status: DISCONTINUED | OUTPATIENT
Start: 2019-11-03 | End: 2019-11-27

## 2019-11-03 RX ORDER — NITROGLYCERIN 0.4 MG/1
0.4 TABLET SUBLINGUAL EVERY 5 MIN PRN
Status: DISCONTINUED | OUTPATIENT
Start: 2019-11-03 | End: 2019-11-27

## 2019-11-03 RX ORDER — ACETAMINOPHEN 325 MG/1
650 TABLET ORAL EVERY 6 HOURS PRN
Status: DISCONTINUED | OUTPATIENT
Start: 2019-11-03 | End: 2019-11-27

## 2019-11-03 RX ORDER — HEPARIN SODIUM 5000 [USP'U]/ML
5000 INJECTION, SOLUTION INTRAVENOUS; SUBCUTANEOUS EVERY 8 HOURS SCHEDULED
Status: DISCONTINUED | OUTPATIENT
Start: 2019-11-03 | End: 2019-11-03

## 2019-11-03 RX ORDER — HEPARIN SODIUM 5000 [USP'U]/ML
60 INJECTION INTRAVENOUS; SUBCUTANEOUS ONCE
Status: COMPLETED | OUTPATIENT
Start: 2019-11-03 | End: 2019-11-03

## 2019-11-03 RX ORDER — LISINOPRIL 10 MG/1
10 TABLET ORAL DAILY
Status: DISCONTINUED | OUTPATIENT
Start: 2019-11-03 | End: 2019-11-19

## 2019-11-03 NOTE — CONSULTS
CARDIOLOGY CONSULT - [facility]      NAME: Josesito Pratt - ROOM: [address] [place] [address] - MRN: [id number] - Age: 75year old - :  1945    Date of Admission: 2019  6:42 PM  Admission Diagnosis: PVD (peripheral vascular disease Disease: Negative for premature CAD    Allergy:No Known Allergies     PTA Meds:  acetaminophen 325 MG Oral Tab, Take 650 mg by mouth every 6 (six) hours as needed for Pain., Disp: , Rfl:   aspirin 81 MG Oral Tab, Take 81 mg by mouth daily. , Disp: , Rfl: daughter served as my   Neck:  No jvd, no bruits  HEENT:  Symmetric, no temporal wasting  Lungs: clear, no wheezing or rhonchi  Heart: regular s1s2 no murmur  Abdomen:  Soft nontender  :  No CVA tenderness  Extremities:  Both lower legs wrapped

## 2019-11-03 NOTE — PROGRESS NOTES
[facility] Pharmacy Note:  Renal Adjustment for cefazolin (ANCEF)    Bri Yuan is a 75year old female who has been prescribed cefazolin (ANCEF) 1000 mg every 12 hrs. CrCl is estimated creatinine clearance is 45.8 mL/min (based on SCr of 1.02 mg/dL).  so the

## 2019-11-03 NOTE — PLAN OF CARE
Dr. Ernie Hernandes on consult for Vascular Surgery. Paged and spoke with him about patient.  Dr. Ernie Hernandes stated he would be in sometime tonight or tomorrow morning

## 2019-11-03 NOTE — PROGRESS NOTES
[facility]  Progress Note     Patrice Hernandez Patient Status:  Inpatient    1945 MRN [id number]   [facility] 4NW-A Attending Norma Crockett MD   Hosp Day # 1 PCP PHYSICIAN NONSTAFF     Chief Complaint: Leg pain    S: Patient wit mg Oral Daily   • Heparin Sodium (Porcine)  60 Units/kg Intravenous Once   • Initial dose Heparin infusion  12 Units/kg/hr Intravenous Once   • Clopidogrel Bisulfate  75 mg Oral Daily   • hydrochlorothiazide  25 mg Oral Daily   • lisinopril  10 mg Oral Abdifatah Melo

## 2019-11-03 NOTE — CONSULTS
INFECTIOUS DISEASE CONSULT NOTE    Dianne Perales Patient Status:  Inpatient    1945 MRN [id number]   [facility] 7NE-A Attending Howard Sifuentes MD   Hosp Day # 1 PCP PHYSICIAN NON •  heparin (PORCINE) drip [id number]/250mL infusion CONTINUOUS, 200-3,000 Units/hr, Intravenous, Continuous  •  Clopidogrel Bisulfate (PLAVIX) tab 75 mg, 75 mg, Oral, Daily  •  hydrochlorothiazide (HYDRODIURIL) tab 25 mg, 25 mg, Oral, Daily  •  lisinopril Respiratory: Clear to auscultation bilaterally. No wheezes. No rhonchi. Cardiovascular: S1, S2.  Regular rate and rhythm. No murmurs. Abdomen: Soft, nontender, nondistended. Positive bowel sounds. Musculoskeletal: no arthritis.   Integument: multiple PROCEDURE:  XR TIBIA + FIBULA (2 VIEWS), LEFT (CPT=73590)  TECHNIQUE:  AP and lateral views of the tibia and fibula were obtained. COMPARISON:  None.   INDICATIONS:  Rule out osteomyelitis  PATIENT STATED HISTORY: (As transcribed by Technologist)  Patient Result Date: 11/2/2019  PROCEDURE:  US VENOUS DOPPLER LEG BILAT - DIAG IMG (CPT=93970)  COMPARISON:  None. INDICATIONS:  eval for DVT  TECHNIQUE:  Real time, grey scale, and duplex ultrasound was used to evaluate the lower extremity venous system.  B-mode

## 2019-11-03 NOTE — ED NOTES
Wet to Dry dressing done to BLE wounds as ordered by Dr Erasmo Lopez. Pt tolerated well. Family remains at bedside. Pt seen calm, more relaxed.  No distress noted

## 2019-11-03 NOTE — ED PROVIDER NOTES
Patient Seen in: [facility] Emergency Department      History   Patient presents with:  Pain (neurologic)  Cellulitis (integumentary, infectious)    Stated Complaint:     HPI    56-year-old female was brought to the emergency department by family me General appearance: This is an elderly female lying in a gurney. Vital signs were reviewed per nurse's notes. HEENT: Normocephalic atraumatic. Anicteric sclera. Oral mucosa is moist.  Oropharynx is normal.  Neck: No adenopathy or thyromegaly.   Lungs ar A total of 31 minutes of critical care time (exclusive of billable procedures) was administered to manage the patient's Extensive wounds and metabolic work-up due to her severe cellulitis and skin wounds.   This involved direct patient intervention, complex

## 2019-11-03 NOTE — PROGRESS NOTES
Assumed care at around 0730, pt is aaox4, Nauruan speaking only, daughter is  at bedside, dr. Jeremiah Bustamante here and seen pt, BLE dressing changed, wound has foul smell, has serrous drainage, wound bed has necrotic areas, reddened, sloughing, red & swollen in sor

## 2019-11-03 NOTE — H&P
[facility]  History and Physical     Prudence Hugh Patient Status:  Inpatient    1945 MRN [id number]   [facility] 4NW-A Attending Randall Aschoff, MD   Hosp Day # 1 PCP PHYSICIAN NONSTAFF     Chief Complaint: leg pain    Histo aspirin 81 MG Oral Tab, Take 81 mg by mouth daily. , Disp: , Rfl:   Clopidogrel Bisulfate 75 MG Oral Tab, Take 75 mg by mouth daily. , Disp: , Rfl:   ferrous sulfate 325 (65 FE) MG Oral Tab EC, Take 325 mg by mouth daily with breakfast., Disp: , Rfl:   hyd BUN 75*   CREATSERUM 1.49*   GFRAA 39*   GFRNAA 34*   CA 9.2   *   K 4.4      CO2 19.0*       Estimated Creatinine Clearance: 31.4 mL/min (A) (based on SCr of 1.49 mg/dL (H)). No results for input(s): PTP, INR in the last 168 hours.     No

## 2019-11-03 NOTE — CONSULTS
[facility] Dosing Service    Initial Pharmacokinetic Consult for Vancomycin Dosing     Abdi Erickson is a 75year old female who is being treated for necrotic ulcers with cellulitis.   Pharmacy has been asked to dose Vancomycin by Dr. Stefanie Guadalupe.    She has

## 2019-11-04 ENCOUNTER — APPOINTMENT (OUTPATIENT)
Dept: CT IMAGING | Facility: HOSPITAL | Age: 74
DRG: 854 | End: 2019-11-04
Attending: SURGERY
Payer: MEDICARE

## 2019-11-04 ENCOUNTER — APPOINTMENT (OUTPATIENT)
Dept: ULTRASOUND IMAGING | Facility: HOSPITAL | Age: 74
DRG: 854 | End: 2019-11-04
Attending: SURGERY
Payer: MEDICARE

## 2019-11-04 ENCOUNTER — APPOINTMENT (OUTPATIENT)
Dept: ULTRASOUND IMAGING | Facility: HOSPITAL | Age: 74
DRG: 854 | End: 2019-11-04
Attending: HOSPITALIST
Payer: MEDICARE

## 2019-11-04 PROCEDURE — 73706 CT ANGIO LWR EXTR W/O&W/DYE: CPT | Performed by: SURGERY

## 2019-11-04 PROCEDURE — 99222 1ST HOSP IP/OBS MODERATE 55: CPT | Performed by: INTERNAL MEDICINE

## 2019-11-04 PROCEDURE — 99232 SBSQ HOSP IP/OBS MODERATE 35: CPT | Performed by: HOSPITALIST

## 2019-11-04 PROCEDURE — 99232 SBSQ HOSP IP/OBS MODERATE 35: CPT | Performed by: INTERNAL MEDICINE

## 2019-11-04 PROCEDURE — 93970 EXTREMITY STUDY: CPT | Performed by: SURGERY

## 2019-11-04 RX ORDER — POTASSIUM CHLORIDE 20 MEQ/1
40 TABLET, EXTENDED RELEASE ORAL EVERY 4 HOURS
Status: COMPLETED | OUTPATIENT
Start: 2019-11-04 | End: 2019-11-04

## 2019-11-04 RX ORDER — SODIUM HYPOCHLORITE 1.25 MG/ML
SOLUTION TOPICAL AS NEEDED
Status: DISCONTINUED | OUTPATIENT
Start: 2019-11-04 | End: 2019-11-11

## 2019-11-04 RX ORDER — SODIUM CHLORIDE 9 MG/ML
INJECTION, SOLUTION INTRAVENOUS CONTINUOUS
Status: DISCONTINUED | OUTPATIENT
Start: 2019-11-04 | End: 2019-11-04

## 2019-11-04 NOTE — PAYOR COMM NOTE
--------------  CONTINUED STAY REVIEW    Katty Tomlinson COMPLETE MMAI  Subscriber #:  [id number]  Authorization Number: [id number]    Admit date: 11/2/19  Admit time: 2321    Admitting Physician: Sid Sumner MD  Attending Physician:  Sid Sumner MD  P Negative Rods      Us Venous Doppler Leg Bilat  CONCLUSION:  No DVT is identified. Xr Tibia + Fibula, RIGHT    CONCLUSION:  There is scattered soft tissue swelling which may represent cellulitis in the appropriate clinical setting.  No cortical resorpt Hi-Risk Rate/Dose Change 500 Units/hr Intravenous Jenny Thornton RN    11/3/2019 1826 New Bag 500 Units/hr Intravenous Minor Flank, RN      hydrochlorothiazide (HYDRODIURIL) tab 25 mg     Date Action Dose Route User    11/4/2019 0824 Given 25 mg Or Date Action Dose Route User    11/4/2019 0823 Given 40 mEq Oral Solange Jones RN      0.9% NaCl infusion     Date Action Dose Route User    11/3/2019 1827 Rate/Dose Verify (none) Intravenous Thea Correia RN      0.9% NaCl infusion     Date Action

## 2019-11-04 NOTE — PROGRESS NOTES
[facility]                INFECTIOUS DISEASE PROGRESS NOTE    Pablito Chen Patient Status:  Inpatient    1945 MRN [id number]   [facility] 7NE-A Attending Joselin Carvajal MD   Hosp Day # 2 PCP PHYSICIAN NONSTAFF     Antibioti Collection Time: 11/02/19  8:06 PM   Result Value Ref Range    Aerobic Culture Result  N/A     Mixture of organisms suggestive of normal skin lionel    Aerobic Culture Result 3+ growth Streptococcus Group C (A) N/A    Aerobic Smear 2+ WBCs seen N/A    Ae

## 2019-11-04 NOTE — PLAN OF CARE
Assumed care of patient at 1300  A/O but Senegalese speaking only  Low grade fever, antibiotics infusing  Multiple ischemic ulcers on BLE, dressing changed performed and foul odor noted with some drainage  Blood culture positive for cocci chains and streptoco strengthening/mobility  - Encourage toileting schedule  Outcome: Progressing

## 2019-11-04 NOTE — PROGRESS NOTES
[facility]  Progress Note     Cleve Gonzalez Patient Status:  Inpatient    1945 MRN [id number]   [facility] 4NW-A Attending Michael Roman MD   Hosp Day # 2 PCP PHYSICIAN NONSTAFF     Chief Complaint: Leg pain    S: Patient wit in [address] [place] [address] Rd.     Medications:   • Potassium Chloride ER  40 mEq Oral Q4H   • aspirin  325 mg Oral Daily   • Clopidogrel Bisulfate  75 mg Oral Daily   • hydrochlorothiazide  25 mg Oral Daily   • lisinopril  10 mg Oral Daily   • metoprolol tartrate  12.5 mg Oral

## 2019-11-04 NOTE — CONSULTS
[facility]  Report of Inpatient Wound Care Consultation     Linasterling Hernandezkaren Patient Status:  Inpatient    1945 MRN [id number]   [facility] 7NE-A Attending Marcy Shelton MD   Hosp Day # 2 PCP PHYSICIAN NONSTAFF     REASON FOR CON RECOMMENDATIONS:  Received verbal order from  To Marlys Montalvo, PT, MPT for \"Physical Therapy wound care evaluate and treat. \"  Patient seen bedside, RN also here to observe.   Feet warm, +capillary refill at 4 seconds, faint dorsal pedal pulse noted [facility] [address]  [phone]  Spectralink: [phone]  Pager: [phone]  VM: [phone]

## 2019-11-04 NOTE — CONSULTS
[facility]    PATIENT'S NAME: Estelle Whitten   ATTENDING PHYSICIAN: [facility] [place]: Pradeep Morales M.D.    PATIENT ACCOUNT#:   [de-identified]    LOCATION:  [address] [place] [address]  MEDICAL RECORD #:   [id number]       DATE OF BIRTH:  [birth year] been placed on now; lisinopril; tramadol. She was also placed on antibiotics of piperacillin and vancomycin. ALLERGIES:  She has no known allergies. PHYSICAL EXAMINATION:    GENERAL:  She currently is awake.   She does not appear to be in any acute in for Cardiology for evaluation of cardiac status. We will have a CT angiogram ordered once cleared by Nephrology. Agree with the continued IV antibiotics per Infectious Disease.   I left my card with the patient and have it so that her family members co

## 2019-11-04 NOTE — PLAN OF CARE
Assumed care of pt at 1900. AOx4. Family at bedside. Bahamian speaking only. RA.  . Dressings bialteral legs CDI. Foul smelling. IV ABX. Heparin infusing per protocol. 0.9NS infusing at 100cc/hr.  Dr. Ravi Reza to see pt in am.  Pt c/o pain to bilater

## 2019-11-04 NOTE — CONSULTS
[facility]  Report of Consultation    Caprice Calin Patient Status:  Inpatient    1945 MRN [id number]   [facility] 7NE-A Attending Geovanna Leigh MD   Hosp Day # 2 PCP PHYSICIAN NONSTAFF       Assessment / Plan:    1) ANTHONY garrett 1 MG/ML injection 0.4 mg, 0.4 mg, Intravenous, Q2H PRN **OR** HYDROmorphone HCl (DILAUDID) 1 MG/ML injection 0.8 mg, 0.8 mg, Intravenous, Q2H PRN  •  ondansetron HCl (ZOFRAN) injection 4 mg, 4 mg, Intravenous, Q6H PRN  •  heparin (PORCINE) drip [id number]/ bandaged  Neurologic: Cranial nerves grossly intact, moving all extremities  Skin: Warm and dry, no rashes      Laboratory Data:  Lab Results   Component Value Date    WBC 22.0 11/04/2019    HGB 8.1 11/04/2019    HCT 26.0 11/04/2019    .0 11/04/2019

## 2019-11-04 NOTE — PROGRESS NOTES
[facility]  Cardiology Progress Note    Subjective:  No current chest pain or shortness of breath - denies any complaints of this. Evaluated by Dr. Gerson Quiroz earlier today. Still with bilateral leg pain.       Objective:  /48 (BP Location: Right ar disease - Dr. Christi Boyer following. CTA lower ext ordered by Dr. Christi Boyer  · Strep C Bacteremia - per ID  · Bilateral infected distal lower extremity wounds - per ID  · Chest pain, ? [place] ? - denies any complaints of this today.     · Mildly elevated troponin - manoj reasons  -Currently on IV antibiotics for strep C bacteremia    Akash Joe MD  11/4/2019  [facility]  Interventional Cardiology

## 2019-11-04 NOTE — PAYOR COMM NOTE
--------------  ADMISSION REVIEW     Payor: [address] #:  [id number]  Authorization Number: [id number]    Admit date: 11/2/19  Admit time: 2321       Admitting Physician: Jacky Mclaughlin MD  Attending Physician:  Jacky Mclaughlin MD  Pr Temp 98 °F (36.7 °C)   Temp src Temporal   SpO2 100 %   O2 Device None (Room air)     Current:/62   Pulse 77   Temp 98 °F (36.7 °C) (Temporal)   Resp 18   Ht 147.3 cm (4' 10\")   Wt 64 kg   SpO2 100%   BMI 29.47 kg/m²      Physical Exam  General appe A total of 31 minutes of critical care time (exclusive of billable procedures) was administered to manage the patient's Extensive wounds and metabolic work-up due to her severe cellulitis and skin wounds.   This involved direct patient intervention, complex History of Present Illness: Abdi Erickson is a 75year old female with PMH hypertension, hyperlipidemia, PAD who presents with right lower extremity pain. Patient has a history of bilateral lower extremity wounds due to PAD.   Was at an outside hospital a Pertinent positives and negatives noted in the HPI.     Physical Exam:    /56 (BP Location: Right arm)   Pulse 109   Temp 98.3 °F (36.8 °C) (Oral)   Resp 20   Ht 147.3 cm (4' 10\")   Wt 134 lb 4.8 oz (60.9 kg)   SpO2 100%   BMI 28.07 kg/m²    General: 1. Trend troponin check EKG  2. Consult cardiology  6. Hyponatremia  7. HTN  8.  HLD  Quality:  · DVT Prophylaxis: heparin  · CODE status: full  · Mcgowan: no  Plan of care discussed with pt, ed physician  Isaías Marin MD  32/0/3420    **Certification  PHYSI REPORT OF CONSULTATION, SURGERY, CARDIOVASCULAR   T-max was 99.6. Pulse 80 to 90, blood pressure 120/48.      IMPRESSION:  A 76-year-old white female with history of hypertension and dyslipidemia, with active cellulitis and open wounds on both legs, right Temp:   98.3 °F (36.8 °C) 99.9 °F (37.7 °C) 99.6 °F (37.6 °C)   TempSrc:   Oral Oral Oral   SpO2:   100% 97% 97%   Weight:   134 lb 4.8 oz (60.9 kg) 134 lb 4.8 oz (60.9 kg)       Oxygen Therapy  SpO2: 97 %  O2 Device: Nasal cannula  O2 Flow Rate (L/min): 1 ceFAZolin (ANCEF) IVPB 1g/100ml in 0.9% NaCl minibag/add-van   Dose: 1 g  Freq: Every 12 hours Route: IV  Start: 11/03/19 0800 End: 11/03/19 0377    Order specific questions:   What infection is this being used to treat?  Cellulitis  What is the anticip 0813-Given   1726-Given        0504-Given   1800          ondansetron HCl (ZOFRAN) injection 4 mg   Dose: 4 mg  Freq:  Once Route: IV  Start: 11/02/19 1947 End: 11/02/19 2013 2013-Given              Piperacillin Sod-Tazobactam So (ZOSYN) 3.375 g in dex Start: 11/03/19 0115 End: 11/04/19 0749     0115-New Bag   0549-New Bag   1827-Rate/Dose Verify      0749-D/C'd          0.9% NaCl infusion   Rate: 125 mL/hr Freq: Continuous Route: IV  Start: 11/02/19 2330 End: 11/03/19 0129    2330-New Bag          0129- 1542-Given   1916-Given     2024-Given          0021-Given   0240-Given   0929-Given        Or  HYDROmorphone HCl (DILAUDID) 1 MG/ML injection 0.8 mg   Dose: 0.8 mg  Freq: Every 2 hour PRN Route: IV  PRN Reason: severe pain  Start: 11/03/19 0104     0217-

## 2019-11-05 ENCOUNTER — ANESTHESIA EVENT (OUTPATIENT)
Dept: SURGERY | Facility: HOSPITAL | Age: 74
DRG: 854 | End: 2019-11-05
Payer: MEDICARE

## 2019-11-05 PROCEDURE — 99232 SBSQ HOSP IP/OBS MODERATE 35: CPT | Performed by: INTERNAL MEDICINE

## 2019-11-05 PROCEDURE — 99233 SBSQ HOSP IP/OBS HIGH 50: CPT | Performed by: INTERNAL MEDICINE

## 2019-11-05 PROCEDURE — 99232 SBSQ HOSP IP/OBS MODERATE 35: CPT | Performed by: HOSPITALIST

## 2019-11-05 NOTE — PROGRESS NOTES
[facility]  Nephrology Progress Note    Arnol Warren Attending:  Malina Sheppard MD       Assessment and Plan:    1) LE necrotic ulcers with cellulitis and strep bacteremia- on zosyn per ID     2) PAD- on heparin gtt; debridement anticipated; CTA not 11/05/2019    TP 6.8 11/05/2019    AST 12 11/05/2019    ALT 11 11/05/2019       Imaging: All imaging studies reviewed.     Meds:   Dakins (1/4 strength) (Sodium hypochlorite (1/4 strength)) 0.125 % external solution, , Topical, PRN  aspirin tab 325 mg, 325

## 2019-11-05 NOTE — CDS QUERY
Heart Failure  CLINICAL Shay Plummer  Dear Doctor:  Clinical information (provided below) suggests a diagnosis of Heart Failure.  For accurate ICD-10-CM code assignment to reflect severity of illness and risk of mortality,  PLEASE “X” AL ulcers, associated supply-demand mismatch. · Mild AS        Mild MR  Please clarify/specify. For questions regarding this query, please contact Clinical : Nany Jameson RN, BSN ext. [phone]                        Thank you.

## 2019-11-05 NOTE — H&P
[facility]    PATIENT'S NAME: Rusty Carmichael   ATTENDING PHYSICIAN: Joanie Quezada M.D.    PATIENT ACCOUNT#:   [de-identified]    LOCATION:  [address] [place] [address]  MEDICAL RECORD #:   [id number]       YOB: 1945  ADMISSION DATE:       11/02/2019 both lower extremities, right greater than left, with necrosis. There is a foul odor present. The patient's foot appears to be cold. There are minimal pulses.   The family member reports the patient is able to weight-bear with a walker, but has not walke

## 2019-11-05 NOTE — PLAN OF CARE
Assumed care at 1900. VSS. Hong Konger speaking.  line used to update pt on POC  Pain 10/10. Pain medication given with relief of 5/10. Pulses per doppler. Right pedal faint. Pt void per bedpan. Repositioned. Bunny boots on. Pain reassessed. INFECTION - ADULT  Goal: Absence of fever/infection during anticipated neutropenic period  Description  INTERVENTIONS  - Monitor WBC  - Administer growth factors as ordered  - Implement neutropenic guidelines  11/5/2019 0529 by Mena Salas RN  Outcome: Sylvia Barry RN  Outcome: Progressing  11/5/2019 0446 by Sylvia Barry RN  Outcome: Progressing

## 2019-11-05 NOTE — PLAN OF CARE
Assumed care at 0700. No acute issues. Vascular workup in progress. Ok to dc heparin gtt per cards and Dr. Luis Garcia. IV Vanco dc'd, remains on IV Zosyn. BLE wounds, wound care assessed. Re-dressed after CTA, US. Daily dressing changes.    Dr. Luis Garcia to assist with strengthening/mobility  - Encourage toileting schedule  Outcome: Progressing

## 2019-11-05 NOTE — ANESTHESIA PREPROCEDURE EVALUATION
PRE-OP EVALUATION    Patient Name: Arnol Warren    Pre-op Diagnosis: inpatient    Procedure(s):  DEBRIDEMENT RIGHT AND LEFT LEG    Surgeon(s) and Role:     Betty Gonzalez MD - Primary    Pre-op vitals reviewed.   Temp: 97.6 °F (36.4 °C)  Pulse: 67  Res HCl (VANCOCIN) 1,500 mg in sodium chloride 0.9% 500 mL IVPB, 25 mg/kg, Intravenous, Once  [] Heparin Sodium (Porcine) 5000 UNIT/ML injection, , ,   HYDROcodone-acetaminophen (NORCO)  MG per tab 1 tablet, 1 tablet, Oral, Q6H PRN  [COMPLETED] HY diastolic dysfunction. 2. Aortic valve: Transvalvular velocity was increased. There was mild     stenosis. Trivial regurgitation. Mean gradient (S): 18mm Hg. Peak     gradient (S): 39mm Hg. Valve area (VTI): 1.81cm^2.  Indexed valve area     (VTI): 1.18cm^ Multiple missing           Pulmonary    Pulmonary exam normal.  Breath sounds clear to auscultation bilaterally.                Other findings            ASA: 3   Plan: general           Comment: Increased risk of MI/CVA discussed and all questions addresse

## 2019-11-05 NOTE — PROGRESS NOTES
[facility]  Cardiology Progress Note    Subjective:  No chest pain or shortness of breath.     Objective:  /55 (BP Location: Right arm)   Pulse 78   Temp 98 °F (36.7 °C) (Oral)   Resp 18   Ht 4' 9.99\" (1.473 m)   Wt 134 lb 4.8 oz (60.9 kg)   SpO valve: Mildly to moderately calcified annulus. There was mild     regurgitation. 4. Left atrium: The left atrium was mildly enlarged.   5. Right atrium: The atrium was mildly dilated.     Impressions:  No previous study was available for comparison    Tele pain, ? etiology - denies any complaints of this since admit. Heparin stopped yesterday and remains pain free. Mildly elev troponin not c/w ACS. Echo with preserved LVSF and no RWMA.   · Mildly elevated troponin - placed on heparin gtt on admit, stopped with no evidence for regional wall motion abnormalities.  Mild AS  -Will obtain stress for further cardiac risk stratification     Noe Barger MD  11/5/2019  [facility]  Interventional Cardiology

## 2019-11-05 NOTE — PLAN OF CARE
Per Faith Neri NP, no need for Heparin gtt for cardiac issues. Asked RN to clarify w/ Dr. Chiki Hart if Hep gtt needed regarding vascular issues. Spoke w/ Dr. Chiki Hart at 2075, stated ok to dc heparin gtt.

## 2019-11-05 NOTE — PROGRESS NOTES
[facility]                INFECTIOUS DISEASE PROGRESS NOTE    Cira Rebolledo Patient Status:  Inpatient    1945 MRN [id number]   [facility] 7NE-A Attending Lynn Gerard MD   Hosp Day # 3 PCP PHYSICIAN NONSTAFF     Antibioti Blood Culture Result No Growth 1 Day N/A   2.  AEROBIC BACTERIAL CULTURE     Status: Abnormal (Preliminary result)    Collection Time: 11/02/19  8:06 PM   Result Value Ref Range    Aerobic Culture Result  N/A     Mixture of organisms suggestive of normal sk Other

## 2019-11-05 NOTE — PLAN OF CARE
Received patient today alert and oriented x3 but Greek speaking only. Family at bedside for most of the afternoon. They were updated on plan of care including stress test in the am and I&D scheduled in the afternoon. Dressings chaged per orders.  Very rikki

## 2019-11-05 NOTE — PHYSICAL THERAPY NOTE
PHYSICAL THERAPY EVALUATION - INPATIENT     Room Number: 6312/7080-X  Evaluation Date: 11/5/2019  Type of Evaluation: Initial  Physician Order: PT Eval and Treat    Presenting Problem: BLE ulcers  Reason for Therapy: Mobility Dysfunction and Discharg 10  Location: BLEs  Management Techniques:  Activity promotion;Repositioning    COGNITION  · Overall Cognitive Status:  WFL - within functional limits  · Orientation Level:  oriented x4  · Following Commands:  follows multistep commands with increased time railing?: Total       AM-PAC Score:  Raw Score: 9   Approx Degree of Impairment: 81.38%   Standardized Score (AM-PAC Scale): 30.55   CMS Modifier (G-Code): CM    FUNCTIONAL ABILITY STATUS  Gait Assessment   Gait Assistance: Dependent assistance(mod a x 2) would benefit from skilled inpatient PT to address the above deficits to assist patient in returning to prior to level of function.   DISCHARGE RECOMMENDATIONS  PT Discharge Recommendations: Sub-acute rehabilitation(ELOS 11-13 days) patient may require scott

## 2019-11-05 NOTE — PROGRESS NOTES
[facility]  Progress Note     Josesito Pratt Patient Status:  Inpatient    1945 MRN [id number]   [facility] 4NW-A Attending Inocente Homans, MD   Hosp Day # 3 PCP PHYSICIAN NONSTAFF     Chief Complaint: Leg pain    S: Patient wit input(s): PTP, INR in the last 168 hours. Recent Labs   Lab 11/03/19  0318 11/03/19  0721 11/03/19  1241   TROP 0.180* 0.400* 0.392*            Imaging: Imaging data reviewed in Epic.     Medications:   • aspirin  325 mg Oral Daily   • Clopidogrel Bisulf

## 2019-11-06 ENCOUNTER — ANESTHESIA (OUTPATIENT)
Dept: SURGERY | Facility: HOSPITAL | Age: 74
DRG: 854 | End: 2019-11-06
Payer: MEDICARE

## 2019-11-06 ENCOUNTER — APPOINTMENT (OUTPATIENT)
Dept: ULTRASOUND IMAGING | Facility: HOSPITAL | Age: 74
DRG: 854 | End: 2019-11-06
Attending: SURGERY
Payer: MEDICARE

## 2019-11-06 ENCOUNTER — APPOINTMENT (OUTPATIENT)
Dept: CV DIAGNOSTICS | Facility: HOSPITAL | Age: 74
DRG: 854 | End: 2019-11-06
Attending: NURSE PRACTITIONER
Payer: MEDICARE

## 2019-11-06 PROCEDURE — 99232 SBSQ HOSP IP/OBS MODERATE 35: CPT | Performed by: HOSPITALIST

## 2019-11-06 PROCEDURE — 78452 HT MUSCLE IMAGE SPECT MULT: CPT | Performed by: NURSE PRACTITIONER

## 2019-11-06 PROCEDURE — 93017 CV STRESS TEST TRACING ONLY: CPT | Performed by: NURSE PRACTITIONER

## 2019-11-06 PROCEDURE — 99232 SBSQ HOSP IP/OBS MODERATE 35: CPT | Performed by: NURSE PRACTITIONER

## 2019-11-06 PROCEDURE — 93925 LOWER EXTREMITY STUDY: CPT | Performed by: SURGERY

## 2019-11-06 PROCEDURE — 93018 CV STRESS TEST I&R ONLY: CPT | Performed by: NURSE PRACTITIONER

## 2019-11-06 NOTE — PROGRESS NOTES
[facility]  Cardiology Progress Note    Judson Sneed Patient Status:  Inpatient    1945 MRN [id number]   [facility] 7NE-A Attending Nae Saxena MD   Hosp Day # 4 PCP PHYSICIAN NONSTAFF     Subjective:  Patient just returned f complaints of this since admit. Heparin stopped  and remains pain free. Mildly elev troponin not c/w ACS. Echo with preserved LVSF and no RWMA. Nuclear stress test negative for reversible ischemia, EF 66%.  Small fixed defect in the anterior septal porti

## 2019-11-06 NOTE — PLAN OF CARE
Assumed care 1900. Patient complaining of pain. Dilaudid given. Pedal pulses present by doppler. Leg wound dressings clean dry and intact. IV antibiotics given. Consent form signed. Patient and family updated on POC.

## 2019-11-06 NOTE — OCCUPATIONAL THERAPY NOTE
OCCUPATIONAL THERAPY EVALUATION - INPATIENT     Room Number: 8146/3546-I  Evaluation Date: 11/6/2019  Type of Evaluation: Initial  Presenting Problem: RLE cellulitis, BLE wounds; PAD, AZUCENA    Physician Order: IP Consult to Occupational Therapy  Reason for T lower extremity, initial encounter    Open wound of right lower extremity, initial encounter    PAD (peripheral artery disease) ([facility] [address]    AZUCENA (acute kidney injury) ([facility] [address]    Hyponatremia      Past Medical History  Past Medical History:   Diagnosis Date   • E NEUROLOGICAL FINDINGS  Neurological Findings: Coordination - Finger to Nose  Coordination - Finger to Nose: Symmetrical             ACTIVITIES OF DAILY LIVING ASSESSMENT  AM-PAC ‘6-Clicks’ Inpatient Daily Activity Short Form Inpatient Daily Activity Short Form was completed and  this patient  is demonstrating a  70% degree impairment in activities of daily living. Research supports that patients with this level of impairment often benefit from GINA Rhoades  In this OT evaluation pa (Obs): 3-5x/week  Number of Visits to Meet Established Goals: 7    ADL Goals   Patient will perform grooming: with setup  Patient will perform toileting: with mod assist    Functional Transfer Goals  Patient will transfer from sit to supine:  with min assi

## 2019-11-06 NOTE — PLAN OF CARE
Notified by Nuclear Med staff that patient uncomfortable and c/o leg pain  Upon arrival patient calm and in no apparent distress   Patient c/o severe intermittent Lower extremity leg and wound pain   Previously medicated by previous shift RN with IV hydrom

## 2019-11-06 NOTE — PAYOR COMM NOTE
--------------  CONTINUED STAY REVIEW    [facility]  Subscriber #:  [id number]  Authorization Number: [id number]    Admit date: 11/2/19  Admit time: 2321    Admitting Physician: Randall Aschoff, MD  Attending Physician:  Randall Aschoff, MD P extremity angiogram demonstrates mild calcific atherosclerosis throughout the right SFA and above knee popliteal artery.  Mild multifocal stenosis of the right popliteal artery proximally.  The right posterior tibial artery is   completely occluded. Florence Blackwell therapy w/ ACE/BB/ASA/Plavix. No plans for now for any invasive cardiac w/u in setting of bacteremia/infected leg wounds. Noted plans for debridement with ortho tomorrow - agree. Dr. Torres Albino to comment later today and provide clearance for OR tomorrow. 0. 68 0.52*  0.52*   GFRAA 62 74 99  99 108  108   GFRNAA 54* 64 86  86 94  94   CA 8.4* 8.3* 8.3* 8.6   ALB 2.1*  --  1.5* 1.6*    140 143 143   K 3.8 4.7 3.0* 4.0    114* 114* 116*   CO2 18.0* 20.0* 21.0 21.0   ALKPHO 97  --  123 104   AST 13* ADMISSION

## 2019-11-06 NOTE — PROGRESS NOTES
[facility]                INFECTIOUS DISEASE PROGRESS NOTE    Abdifatah Willis Patient Status:  Inpatient    1945 MRN [id number]   [facility] 7NE-A Attending Prashant Solorzano MD   Hosp Day # 4 PCP PHYSICIAN NONSTAFF     Antibioti displayed. No results found for: [facility] Encounter on 11/02/19   1.  BLOOD CULTURE     Status: None (Preliminary result)    Collection Time: 11/03/19  4:56 PM   Result Value Ref Range    Blood Culture Result No Growth 2 Days N/

## 2019-11-06 NOTE — PROGRESS NOTES
CARDIACDIAGNOSTICS NOTE      Patient here for inpatient nuclear Lexiscan stress test.    Pt in nuclear room 1 for resting dose injection, accompanied by Eliezer. Karl Navarro 58. Hospital phone  utilized.  Pt crying, whaling and complaining of pain

## 2019-11-06 NOTE — PROGRESS NOTES
PRELIMINARY CARDIACDIAGNOSTICS STRESS TESTNING RESULTS      Inpatient nuclear Lexiscan stress test ordered by GYPSY Watt in preparation for vascular surgery this afternoon. Telephone [place] interpretation provided by [place], [id number].     Resting

## 2019-11-06 NOTE — PROGRESS NOTES
[facility]  Progress Note     Patrice Hernandez Patient Status:  Inpatient    1945 MRN [id number]   [facility] 4NW-A Attending Norma Crockett MD   Hosp Day # 4 PCP PHYSICIAN NONSTAFF     Chief Complaint: Leg pain    S: Patient wit --  0.2 0.3  --    TP 7.3  --  6.2* 6.8  --     < > = values in this interval not displayed. Estimated Creatinine Clearance: 97.4 mL/min (A) (based on SCr of 0.48 mg/dL (L)). No results for input(s): PTP, INR in the last 168 hours.     Recent Labs

## 2019-11-06 NOTE — PROGRESS NOTES
Seen earlier in day- will need diagnostic angiogram/ for wound debridement/ continue antibiotics.  Studies reviewed

## 2019-11-06 NOTE — PLAN OF CARE
Patient awake, alert and oriented x4  Telemetry, Sinus Rhythm   C/o BLE wound pain; relief with IV PRN hydromorphone   Multiple tests today; including LE doppler; increased pain after LE dressing manipulation after doppler testing  No acute changes in neur influences on pain and pain management  - Manage/alleviate anxiety  - Utilize distraction and/or relaxation techniques  - Monitor for opioid side effects  - Notify MD/LIP if interventions unsuccessful or patient reports new pain  - Anticipate increased rikki post-hospital services based on physician/LIP order or complex needs related to functional status, cognitive ability or social support system  Outcome: Progressing     Problem: Impaired Functional Mobility  Goal: Achieve highest/safest level of mobility/ga

## 2019-11-06 NOTE — PLAN OF CARE
Nuclear stress test negative for reversible ischemia, EF 66%. Small fixed defect in the anterior septal portion of the apex. Discussed with Dr. Tiana Persaud. Patient is at moderate cardiac risk to proceed with debridement today.    3:00 PM

## 2019-11-06 NOTE — CM/SW NOTE
Pt is a 74 yo female admitted for PVD. Pt will be seen by Dr Debra Navas. Pt lives with her dtr in a home in [place]. PT/OT recommending NED. Pt has [facility] - will look on insurance website to determine which SNFs are in pt's insurance network.   List wi

## 2019-11-06 NOTE — PROGRESS NOTES
For wound debridement. Antibiotics per ID/ CTA reviewed.  Duplex Lower extremity arterial ordered- at this time continue wound care/ antibiotics- may need future angiogram

## 2019-11-07 PROCEDURE — 0KDT0ZZ EXTRACTION OF LEFT LOWER LEG MUSCLE, OPEN APPROACH: ICD-10-PCS | Performed by: ORTHOPAEDIC SURGERY

## 2019-11-07 PROCEDURE — 99232 SBSQ HOSP IP/OBS MODERATE 35: CPT | Performed by: INTERNAL MEDICINE

## 2019-11-07 PROCEDURE — 0KDS0ZZ EXTRACTION OF RIGHT LOWER LEG MUSCLE, OPEN APPROACH: ICD-10-PCS | Performed by: ORTHOPAEDIC SURGERY

## 2019-11-07 RX ORDER — POLYETHYLENE GLYCOL 3350 17 G/17G
17 POWDER, FOR SOLUTION ORAL DAILY PRN
Status: DISCONTINUED | OUTPATIENT
Start: 2019-11-07 | End: 2019-11-27

## 2019-11-07 RX ORDER — LIDOCAINE HYDROCHLORIDE 10 MG/ML
INJECTION, SOLUTION EPIDURAL; INFILTRATION; INTRACAUDAL; PERINEURAL AS NEEDED
Status: DISCONTINUED | OUTPATIENT
Start: 2019-11-07 | End: 2019-11-07 | Stop reason: SURG

## 2019-11-07 RX ORDER — HYDROCODONE BITARTRATE AND ACETAMINOPHEN 10; 325 MG/1; MG/1
1 TABLET ORAL EVERY 6 HOURS PRN
Status: DISCONTINUED | OUTPATIENT
Start: 2019-11-07 | End: 2019-11-09

## 2019-11-07 RX ORDER — ONDANSETRON 2 MG/ML
INJECTION INTRAMUSCULAR; INTRAVENOUS
Status: COMPLETED
Start: 2019-11-07 | End: 2019-11-07

## 2019-11-07 RX ORDER — SODIUM CHLORIDE, SODIUM LACTATE, POTASSIUM CHLORIDE, CALCIUM CHLORIDE 600; 310; 30; 20 MG/100ML; MG/100ML; MG/100ML; MG/100ML
INJECTION, SOLUTION INTRAVENOUS CONTINUOUS
Status: DISCONTINUED | OUTPATIENT
Start: 2019-11-07 | End: 2019-11-07 | Stop reason: HOSPADM

## 2019-11-07 RX ORDER — ONDANSETRON 2 MG/ML
4 INJECTION INTRAMUSCULAR; INTRAVENOUS EVERY 6 HOURS PRN
Status: DISCONTINUED | OUTPATIENT
Start: 2019-11-07 | End: 2019-11-07

## 2019-11-07 RX ORDER — BISACODYL 10 MG
10 SUPPOSITORY, RECTAL RECTAL
Status: DISCONTINUED | OUTPATIENT
Start: 2019-11-07 | End: 2019-11-27

## 2019-11-07 RX ORDER — MIDAZOLAM HYDROCHLORIDE 1 MG/ML
INJECTION INTRAMUSCULAR; INTRAVENOUS
Status: COMPLETED
Start: 2019-11-07 | End: 2019-11-07

## 2019-11-07 RX ORDER — ONDANSETRON 2 MG/ML
4 INJECTION INTRAMUSCULAR; INTRAVENOUS AS NEEDED
Status: DISCONTINUED | OUTPATIENT
Start: 2019-11-07 | End: 2019-11-07 | Stop reason: HOSPADM

## 2019-11-07 RX ORDER — SODIUM PHOSPHATE, DIBASIC AND SODIUM PHOSPHATE, MONOBASIC 7; 19 G/133ML; G/133ML
1 ENEMA RECTAL ONCE AS NEEDED
Status: DISCONTINUED | OUTPATIENT
Start: 2019-11-07 | End: 2019-11-27

## 2019-11-07 RX ORDER — HYDROMORPHONE HYDROCHLORIDE 1 MG/ML
0.4 INJECTION, SOLUTION INTRAMUSCULAR; INTRAVENOUS; SUBCUTANEOUS EVERY 5 MIN PRN
Status: DISCONTINUED | OUTPATIENT
Start: 2019-11-07 | End: 2019-11-07 | Stop reason: HOSPADM

## 2019-11-07 RX ORDER — ONDANSETRON 2 MG/ML
INJECTION INTRAMUSCULAR; INTRAVENOUS AS NEEDED
Status: DISCONTINUED | OUTPATIENT
Start: 2019-11-07 | End: 2019-11-07 | Stop reason: SURG

## 2019-11-07 RX ORDER — MIDAZOLAM HYDROCHLORIDE 1 MG/ML
1 INJECTION INTRAMUSCULAR; INTRAVENOUS EVERY 5 MIN PRN
Status: COMPLETED | OUTPATIENT
Start: 2019-11-07 | End: 2019-11-07

## 2019-11-07 RX ORDER — HYDROMORPHONE HYDROCHLORIDE 1 MG/ML
INJECTION, SOLUTION INTRAMUSCULAR; INTRAVENOUS; SUBCUTANEOUS
Status: COMPLETED
Start: 2019-11-07 | End: 2019-11-07

## 2019-11-07 RX ORDER — DOCUSATE SODIUM 100 MG/1
100 CAPSULE, LIQUID FILLED ORAL 2 TIMES DAILY
Status: DISCONTINUED | OUTPATIENT
Start: 2019-11-07 | End: 2019-11-27

## 2019-11-07 RX ORDER — NALOXONE HYDROCHLORIDE 0.4 MG/ML
80 INJECTION, SOLUTION INTRAMUSCULAR; INTRAVENOUS; SUBCUTANEOUS AS NEEDED
Status: DISCONTINUED | OUTPATIENT
Start: 2019-11-07 | End: 2019-11-07 | Stop reason: HOSPADM

## 2019-11-07 RX ORDER — ASPIRIN 325 MG
325 TABLET ORAL 2 TIMES DAILY
Status: DISCONTINUED | OUTPATIENT
Start: 2019-11-07 | End: 2019-11-20

## 2019-11-07 RX ADMIN — LIDOCAINE HYDROCHLORIDE 50 MG: 10 INJECTION, SOLUTION EPIDURAL; INFILTRATION; INTRACAUDAL; PERINEURAL at 19:26:00

## 2019-11-07 RX ADMIN — ONDANSETRON 4 MG: 2 INJECTION INTRAMUSCULAR; INTRAVENOUS at 20:04:00

## 2019-11-07 NOTE — PROGRESS NOTES
Resting- no family around. Will need angiogram once infection controlled.  Discussed with Dr. Geronimo Del Toro

## 2019-11-07 NOTE — PROGRESS NOTES
[facility]  Progress Note     Cristian Bryan Patient Status:  Inpatient    1945 MRN [id number]   [facility] 7NE-A Attending Pratik Parra MD   Hosp Day # 5 PCP PHYSICIAN NONSTAFF     Chief Complaint: leg wounds  S: Patient wit 114* 116* 111 110   CO2 18.0*   < > 21.0 21.0 22.0 21.0   ALKPHO 97  --  123 104  --   --    AST 13*  --  15 12*  --   --    ALT 9*  --  6* 11*  --   --    BILT 0.2  --  0.2 0.3  --   --    TP 7.3  --  6.2* 6.8  --   --     < > = values in this interval no

## 2019-11-07 NOTE — CM/SW NOTE
Spoke with pt's son Evans about NED choices. Gave son Velia [facility] SNF list and asked him to pick one for pt. Pt is supposed to have an I&D today. Pt is not yet ready for d/c.  SW to f/u with family's NED choice.

## 2019-11-07 NOTE — PROGRESS NOTES
[facility]  Cardiology Progress Note    [facility] Patient Status:  Inpatient    1945 MRN [id number]   [facility] 7NE-A Attending Adolfo Reed MD   Hosp Day # 5 PCP PHYSICIAN NONSTAFF     Subjective:  States foot pain is con 2x Daily(Beta Blocker)   • piperacillin-tazobactam  3.375 g Intravenous Q8H         Assessment:  · Strep C Bacteremia - antibiotics per ID  · PAD, Bilateral infected distal lower extremity wounds, as above - per ID.  Dr. Jolanta Chester consult noted - plans f underlying sepsis  -Remains asx. Echo here with normal systolic function with no evidence for regional wall motion abnormalities.  Mild AS  -Stress with small fixed defect    Shruthi Nazario MD  11/7/2019  [facility]  Interventional Cardiology

## 2019-11-07 NOTE — PHYSICAL THERAPY NOTE
PHYSICAL THERAPY TREATMENT NOTE - INPATIENT    Room Number: 9059/1041-E     Session: 1   Number of Visits to Meet Established Goals: 5    Presenting Problem: BLE ulcers    Problem List  Principal Problem:    Cellulitis of right lower extremity  Active Pro Climbing 3-5 steps with a railing?: Total       AM-PAC Score:  Raw Score: 8   Approx Degree of Impairment: 86.62%   Standardized Score (AM-PAC Scale): 28.58   CMS Modifier (G-Code): CM    FUNCTIONAL ABILITY STATUS  Gait Assessment   Gait Assistance: Not te

## 2019-11-07 NOTE — PLAN OF CARE
Assumed care at 0730  Patient A&Ox4  NSR on tele  C/o BLE pain, worse with movement  Some relief of pain with PRN dilaudid  Kept NPO since midnight  Surgery rescheduled for this evening, family updated  Patient refusing to get up or work with PT today  BLE

## 2019-11-07 NOTE — PLAN OF CARE
Assumed care 1900. Patient complaining of pain intermittently throughout the night. Dilaudid given. Norco given. IV antibiotics given. Pedal and tibial pulses per doppler bilaterally. Patient repositioned in bed.   Patient's right heel has blanchable r

## 2019-11-07 NOTE — PROGRESS NOTES
[facility]                INFECTIOUS DISEASE PROGRESS NOTE    Prudence Clas Patient Status:  Inpatient    1945 MRN [id number]   [facility] 7NE-A Attending Randall Aschoff, MD   Hosp Day # 5 PCP PHYSICIAN NONSTAFF     Antibioti 13*  --  15 12*  --   --    ALT 9*  --  6* 11*  --   --    BILT 0.2  --  0.2 0.3  --   --    TP 7.3  --  6.2* 6.8  --   --     < > = values in this interval not displayed.        No results found for: [facility] Encounter on 11/02/19

## 2019-11-08 PROCEDURE — 99232 SBSQ HOSP IP/OBS MODERATE 35: CPT | Performed by: INTERNAL MEDICINE

## 2019-11-08 RX ORDER — FUROSEMIDE 10 MG/ML
20 INJECTION INTRAMUSCULAR; INTRAVENOUS ONCE
Status: COMPLETED | OUTPATIENT
Start: 2019-11-08 | End: 2019-11-08

## 2019-11-08 RX ORDER — POTASSIUM CHLORIDE 20 MEQ/1
40 TABLET, EXTENDED RELEASE ORAL EVERY 4 HOURS
Status: COMPLETED | OUTPATIENT
Start: 2019-11-08 | End: 2019-11-08

## 2019-11-08 NOTE — DIETARY NOTE
[facility]     Admitting diagnosis:  PVD (peripheral vascular disease) ([facility] [address] [I73.9]  Cellulitis of right lower extremity [N42.966]  Open wound of right lower extremity, initial encounter [S81.801A]  Open wound o

## 2019-11-08 NOTE — PROGRESS NOTES
[facility]    Progress Note    Kierra Fritz Patient Status:  Inpatient    1945 MRN [id number]   [facility] 7NE-A Attending Franklyn Ballesteros MD   Hosp Day # 6 PCP PHYSICIAN NONSTAFF       Subjective:   POD #1 bilateral leg I&D    P below the knee with no hemodynamically significant stenosis identified above the knee. 2. In the left leg, no flow is seen in the posterior tibial artery and dorsal pedal artery with flow identified in the anterior tibial artery.    Dictated by: Monika Jonas

## 2019-11-08 NOTE — PLAN OF CARE
Assumed care at 0730  Patient A&Ox4  NSR on tele  Pain controlled with PRN dilaudid and norco  BLE surgical dressings c/d/i  BLE elevated in chair and bed  Up to chair this afternoon  Difficulty with transferring back to commode/bed from chair with x2 assi

## 2019-11-08 NOTE — OPERATIVE REPORT
[facility]    PATIENT'S NAME: Natasha Christensen   ATTENDING PHYSICIAN: Zach Sapp M.D. OPERATING PHYSICIAN: Yaw Lau M.D.    PATIENT ACCOUNT#:   [de-identified]    LOCATION:  [address] [place] [address]  MEDICAL RECORD #:   [id number]       DATE OF BIRTH:  [birth year]. The intraoperative findings were discussed with the patient's various family members and postoperative instructions were written. There were no complications. Patient tolerated the procedure well.     Dictated By Shante Corral M.D.  d: 6

## 2019-11-08 NOTE — PROGRESS NOTES
[facility]  Progress Note     Keesha Chang Patient Status:  Inpatient    1945 MRN [id number]   [facility] 7NE-A Attending Bernard Rowland MD   Hosp Day # 6 PCP PHYSICIAN NONSTAFF     Chief Complaint: leg pain    S: Patient wit --       < > 143 143 141 140  --    K 3.8   < > 3.0* 4.0 3.4* 3.3* 3.5      < > 114* 116* 111 110  --    CO2 18.0*   < > 21.0 21.0 22.0 21.0  --    ALKPHO 97  --  123 104  --   --   --    AST 13*  --  15 12*  --   --   --    ALT 9*  --  6* 11*

## 2019-11-08 NOTE — PHYSICAL THERAPY NOTE
PHYSICAL THERAPY TREATMENT NOTE - INPATIENT    Room Number: 6667/5372-C     Session: 2   Number of Visits to Meet Established Goals: 5    Presenting Problem: BLE ulcers    Problem List  Principal Problem:    Cellulitis of right lower extremity  Active Pro from a bed to a chair (including a wheelchair)?: A Lot   -   Need to walk in hospital room?: Total   -   Climbing 3-5 steps with a railing?: Total       AM-PAC Score:  Raw Score: 10   Approx Degree of Impairment: 76.75%   Standardized Score (AM-PAC Scale): management)  Frequency (Obs): 3-5x/week    CURRENT GOALS     Goal #1 Patient is able to demonstrate supine - sit EOB @ level: minimum assistance      Goal #2 Patient is able to demonstrate transfers Sit to/from Stand at assistance level: minimum assistance

## 2019-11-08 NOTE — ANESTHESIA PROCEDURE NOTES
Airway  Date/Time: 11/7/2019 7:30 PM  Urgency: elective      General Information and Staff    Patient location during procedure: OR  Anesthesiologist: Abel Soares MD  Performed: anesthesiologist     Indications and Patient Condition  Indications for air

## 2019-11-08 NOTE — ANESTHESIA POSTPROCEDURE EVALUATION
[address] Patient Status:  Inpatient   Age/Gender 75year old female MRN [id number]   [facility] SURGERY Attending Maile Johnston MD   Hosp Day # 5 PCP PHYSICIAN NONSTAFF       Anesthesia Post-op Note    Procedure(s):

## 2019-11-08 NOTE — PROGRESS NOTES
[facility]                INFECTIOUS DISEASE PROGRESS NOTE    Joel Pascal Patient Status:  Inpatient    1945 MRN [id number]   [facility] 7NE-A Attending Romeo Pierson MD   Hosp Day # 6 PCP PHYSICIAN NONSTAFF     Antibioti < > 3.0* 4.0 3.4* 3.3* 3.5      < > 114* 116* 111 110  --    CO2 18.0*   < > 21.0 21.0 22.0 21.0  --    ALKPHO 97  --  123 104  --   --   --    AST 13*  --  15 12*  --   --   --    ALT 9*  --  6* 11*  --   --   --    BILT 0.2  --  0.2 0.3  --   -- PAD/angio planned per cards-PV    Judene Landau, MD  [facility]  [phone]

## 2019-11-08 NOTE — CM/SW NOTE
MSW spoke with family at bedside. They have not yet made a decision on NED. MSW will continue to monitor for referrals. The patient's son will call MSW later today.     Ryann Jones \[facility]\""W

## 2019-11-08 NOTE — PLAN OF CARE
Received pt from PACU at 2130. Pt is alert but drowsy. Denies pain or discomfort. BLE surgical dressing C/D/I with ace wrapped. Elevated with pillow. Pedal pulses by doppler. Plan of care discussed with family. Will continue to monitor.

## 2019-11-08 NOTE — PROGRESS NOTES
[facility] Cardiology Progress Note    Subjective:  Daughter at bedside. She relates that her mother is comfortable. Was able to stand and work with therapy today.      Objective:  /60 (BP Location: Right arm)   Pulse 67   Temp 97.9 °F (36.6 °C) (Oral) CCE, 2+ distal pulse     Assessment and Plan:    25-year-old female with PMHx significant for PAD, HTN, HL admitted with worsening bilateral LE necrotic ulcers with cellulitis     PAD: Necrotic ulcers with cellulitis  -Vascular surgery following for infect

## 2019-11-08 NOTE — BRIEF OP NOTE
Pre-Operative Diagnosis: Pain [R52]     Post-Operative Diagnosis: Pain [R52]      Procedure Performed:   Procedure(s):  EXTREMITY LOWER IRRIGATION & DEBRIDEMENT OF RIGHT AND LEFT LEG    Surgeon(s) and Role:     Mc Ayon MD - Primary    Assistant(s

## 2019-11-09 PROCEDURE — 99232 SBSQ HOSP IP/OBS MODERATE 35: CPT | Performed by: CLINICAL NURSE SPECIALIST

## 2019-11-09 PROCEDURE — 99232 SBSQ HOSP IP/OBS MODERATE 35: CPT | Performed by: INTERNAL MEDICINE

## 2019-11-09 RX ORDER — OXYCODONE HYDROCHLORIDE AND ACETAMINOPHEN 5; 325 MG/1; MG/1
1 TABLET ORAL EVERY 4 HOURS PRN
Status: DISCONTINUED | OUTPATIENT
Start: 2019-11-09 | End: 2019-11-27

## 2019-11-09 RX ORDER — OXYCODONE HYDROCHLORIDE AND ACETAMINOPHEN 5; 325 MG/1; MG/1
2 TABLET ORAL EVERY 4 HOURS PRN
Status: DISCONTINUED | OUTPATIENT
Start: 2019-11-09 | End: 2019-11-27

## 2019-11-09 NOTE — PLAN OF CARE
Pt A/Ox3, forgetful at times, [place] speaking  On RA, NSR per tele  C/o of bilateral leg pain, IV Dilaudid given for severe pain and Percocet given for moderate pain. [place] d/c'd   Straight cath x1 at 1540 with 700 ml output  IV abx given as ordered.   Triston

## 2019-11-09 NOTE — PROGRESS NOTES
[facility]  Progress Note     Parmindern Reusing Patient Status:  Inpatient    1945 MRN [id number]   [facility] 7NE-A Attending Renee Linton MD   Hosp Day # 7 PCP PHYSICIAN NONSTAFF     Chief Complaint: leg pain    S: Patient wit 1. 5* 1.6*  --   --   --   --   --       < > 143 143 141 140  --   --   --    K 3.8   < > 3.0* 4.0 3.4* 3.3* 3.5 4.4  --       < > 114* 116* 111 110  --   --   --    CO2 18.0*   < > 21.0 21.0 22.0 21.0  --   --   --    ALKPHO 97  --  123 104  -- contact-> [phone]) RN.     Mariana Carrillo MD

## 2019-11-09 NOTE — PROGRESS NOTES
[facility]  Orthopedic Surgery  Progress Note    Adriana Lundberg Patient Status:  Inpatient    1945 MRN [id number]   [facility] 7NE-A Attending Nayeli Godoy MD   Hosp Day # 7 PCP PHYSICIAN NONSTAFF     SUBJECTIVE:  INTERVAL

## 2019-11-09 NOTE — PROGRESS NOTES
[facility]                INFECTIOUS DISEASE PROGRESS NOTE    Bri Yuan Patient Status:  Inpatient    1945 MRN [id number]   [facility] 7NE-A Attending Betty Sherman MD   Hosp Day # 7 PCP PHYSICIAN NONSTAFF     Antibioti ALB 2.1*  --  1.5* 1.6*  --   --   --   --   --       < > 143 143 141 140  --   --   --    K 3.8   < > 3.0* 4.0 3.4* 3.3* 3.5 4.4  --       < > 114* 116* 111 110  --   --   --    CO2 18.0*   < > 21.0 21.0 22.0 21.0  --   --   --    ALKPHO 97 Hyponatremia      ASSESSMENT/PLAN:  1. SP Debridement of necrotic leg wounds  With group C strep bacteremia  -drainage cultures growing group C strep, Pseudomonas, MRSA    Continue vanc, zosyn - 2weeks posotop  Await dressing changes    2.  PAD/angio planne

## 2019-11-09 NOTE — PROGRESS NOTES
[facility] dosing service    Follow-up Pharmacokinetic Consult for Vancomycin Dosing     Abdi Erickson is a 75year old female who is being treated for B/L LE necrotic ulcers with cellulitis s/p debridement of legs 11/7.    Patient is on day 6 of Vancomy Erythromycin >=8 Resistant      Gentamicin <=0.5 Sensitive      Levofloxacin 4 Resistant      Oxacillin >=4 Resistant      Tetracycline <=1 Sensitive      Trimethoprim/Sulfa >=320 Resistant      Vancomycin 1 Sensitive        Based on the above:    1.  Millie Ocampo

## 2019-11-09 NOTE — PROGRESS NOTES
Patient will need angiogram - called son from [facility]- got his sister- who did not want to address any medical issues.  Will make arrangements this week

## 2019-11-09 NOTE — PLAN OF CARE
Assumed care @ 1900. Patient alert oriented x4. [place]ar[place] speaking only. Family @ the bedside,   Family as . On telemetry monitoring. Denies SOB, patient in pain, on pain meds. Updated patient with plan of care, verbalizes understanding.   Ensu risk of injury  - Provide assistive devices as appropriate  - Consider OT/PT consult to assist with strengthening/mobility  - Encourage toileting schedule  Outcome: Progressing     Problem: DISCHARGE PLANNING  Goal: Discharge to home or other facility with

## 2019-11-09 NOTE — PROGRESS NOTES
[facility] Cardiology Progress Note    Subjective:  Family at bedside asking that I not wake up the patient.       Objective:  /50 (BP Location: Right arm)   Pulse 56   Temp 97.8 °F (36.6 °C) (Oral)   Resp 20   Ht 147.3 cm (4' 9.99\")   Wt 134 lb 4.8 oz

## 2019-11-10 PROCEDURE — 99232 SBSQ HOSP IP/OBS MODERATE 35: CPT | Performed by: INTERNAL MEDICINE

## 2019-11-10 PROCEDURE — 99232 SBSQ HOSP IP/OBS MODERATE 35: CPT | Performed by: CLINICAL NURSE SPECIALIST

## 2019-11-10 RX ORDER — ALFUZOSIN HYDROCHLORIDE 10 MG/1
10 TABLET, EXTENDED RELEASE ORAL
Status: DISCONTINUED | OUTPATIENT
Start: 2019-11-10 | End: 2019-11-27

## 2019-11-10 RX ORDER — SODIUM CHLORIDE 9 MG/ML
INJECTION, SOLUTION INTRAVENOUS ONCE
Status: COMPLETED | OUTPATIENT
Start: 2019-11-10 | End: 2019-11-10

## 2019-11-10 RX ORDER — SODIUM CHLORIDE 9 MG/ML
INJECTION, SOLUTION INTRAVENOUS CONTINUOUS
Status: DISCONTINUED | OUTPATIENT
Start: 2019-11-10 | End: 2019-11-10

## 2019-11-10 NOTE — CONSULTS
[facility] dosing service    Follow-up Pharmacokinetic Consult for Vancomycin Dosing     All Cross is a 75year old female who is being treated for cellulitis s/p debridement of legs on 11/7.   Patient is on day 7 of Vancomycin and is currently recei Levofloxacin 4 Resistant      Oxacillin >=4 Resistant      Tetracycline <=1 Sensitive      Trimethoprim/Sulfa >=320 Resistant      Vancomycin 1 Sensitive        Based on the above:    1.  Continue Vancomycin at 1 gm IVPB Q 18 hours (based on [place] of 14.1

## 2019-11-10 NOTE — PROGRESS NOTES
[facility]  Orthopedic Surgery  Progress Note    Cleve Gonzalez Patient Status:  Inpatient    1945 MRN [id number]   [facility] 7NE-A Attending Maile Johnston MD   Hosp Day # 8 PCP PHYSICIAN NONSTAFF     SUBJECTIVE:  INTERVAL

## 2019-11-10 NOTE — PLAN OF CARE
Pt A/Ox3, forgetful at times, Faroese speaking  On RA, NSR per tele  C/o of bilateral leg pain, Percocet given with relief  Up with sit to stand to chair and bathroom with no success of urinating  Straight cath x1 at 1600 with 350 ml output  IV abx given a

## 2019-11-10 NOTE — PROGRESS NOTES
[facility] Cardiology Progress Note    Subjective:  No complaints for me.      Objective:  /60 (BP Location: Right arm)   Pulse 75   Temp 97.6 °F (36.4 °C) (Oral)   Resp 21   Ht 147.3 cm (4' 9.99\")   Wt 134 lb 4.8 oz (60.9 kg)   SpO2 94%   BMI 28.08 kg/

## 2019-11-10 NOTE — PROGRESS NOTES
[facility]  Progress Note     Patrice Hernandez Patient Status:  Inpatient    1945 MRN [id number]   [facility] 7NE-A Attending Rama Downs MD   Hosp Day # 8 PCP PHYSICIAN NONSTAFF     Chief Complaint: poor urine output    S: Pa 4.4  --   --    * 116* 111 110  --   --   --   --    CO2 21.0 21.0 22.0 21.0  --   --   --   --    ALKPHO 123 104  --   --   --   --   --   --    AST 15 12*  --   --   --   --   --   --    ALT 6* 11*  --   --   --   --   --   --    BILT 0.2 0.3  --

## 2019-11-10 NOTE — PLAN OF CARE
Assumed care @ 1900. Patient alert oriented x4. South African speaking. Bladder scanned  @ 2200 - less than 100  No urge to void  Family at the bedside,   Turned q2h. On telemetry monitoring. Denies SOB, c/o pain , on pain meds.   Updated patient with plan of call for assistance with activity based on assessment  - Modify environment to reduce risk of injury  - Provide assistive devices as appropriate  - Consider OT/PT consult to assist with strengthening/mobility  - Encourage toileting schedule  Outcome: Progr

## 2019-11-10 NOTE — PROGRESS NOTES
[facility]                INFECTIOUS DISEASE PROGRESS NOTE    Judson Sneed Patient Status:  Inpatient    1945 MRN [id number]   [facility] 7NE-A Attending Nae Saxena MD   Hosp Day # 8 PCP PHYSICIAN NONSTAFF     Antibioti --     143 141 140  --   --   --   --    K 3.0* 4.0 3.4* 3.3* 3.5 4.4  --   --    * 116* 111 110  --   --   --   --    CO2 21.0 21.0 22.0 21.0  --   --   --   --    ALKPHO 123 104  --   --   --   --   --   --    AST 15 12*  --   --   --   -- MRSA    Continue vanc, zosyn - 2weeks posotop    2.  PAD/angio planned per cards-PV    Angie Oconnell MD  [facility]  [phone]

## 2019-11-11 ENCOUNTER — TELEPHONE (OUTPATIENT)
Dept: CARDIOLOGY | Age: 74
End: 2019-11-11

## 2019-11-11 PROCEDURE — 99232 SBSQ HOSP IP/OBS MODERATE 35: CPT | Performed by: INTERNAL MEDICINE

## 2019-11-11 PROCEDURE — 30233N1 TRANSFUSION OF NONAUTOLOGOUS RED BLOOD CELLS INTO PERIPHERAL VEIN, PERCUTANEOUS APPROACH: ICD-10-PCS | Performed by: HOSPITALIST

## 2019-11-11 RX ORDER — PANTOPRAZOLE SODIUM 40 MG/1
40 TABLET, DELAYED RELEASE ORAL
Status: DISCONTINUED | OUTPATIENT
Start: 2019-11-11 | End: 2019-11-27

## 2019-11-11 RX ORDER — SODIUM CHLORIDE 9 MG/ML
INJECTION, SOLUTION INTRAVENOUS ONCE
Status: COMPLETED | OUTPATIENT
Start: 2019-11-11 | End: 2019-11-11

## 2019-11-11 NOTE — PROGRESS NOTES
[facility]  Progress Note     Reji Rubensstanislav Patient Status:  Inpatient    1945 MRN [id number]   [facility] 7NE-A Attending Niles Velasquez MD   Hosp Day # 9 PCP PHYSICIAN NONSTAFF     Chief Complaint: anemia     S: Patient slee --     141 140  --   --   --   --  142   K 4.0 3.4* 3.3* 3.5 4.4  --   --  3.5   * 111 110  --   --   --   --  113*   CO2 21.0 22.0 21.0  --   --   --   --  23.0   ALKPHO 104  --   --   --   --   --   --   --    AST 12*  --   --   --   --   -- Heparin  · CODE status: Full  · [place]: yes     Will the patient be referred to [facility] on discharge?: No  Estimated date of discharge: TBD  Discharge is dependent on: Progress  At this point Ms. Natalee Pena is expected to be discharge to: RAFY Baker MD

## 2019-11-11 NOTE — CONSULTS
[facility]  Report of Inpatient Wound Care Consultation     [place] Patient Status:  Inpatient    1945 MRN [id number]   [facility] 7NE-A Attending Maile Johnston MD   Hosp Day # 9 PCP PHYSICIAN NONSTAFF     REASON FOR CON --   --   --   --   --   --   --   --   --    TP 6.8  --   --   --   --   --   --   --   --   --   --    PGLU  --   --   --  108* 116*  --   --   --   --   --   --        Imaging:   PROCEDURE:  US ARTERIAL DUPLEX LOWER EXTREMITY BILATERAL (CPT=93925) [address] [facility] [address]:      Not visualized   LEFT PTA PROX:      No flow   LEFT PTA MID:      No flow   LEFT PTA DISTAL:      No flow   LEFT LAKHWINDER PROX:      87  LEFT LAKHWINDER MID:      63    LEFT DORSALIS PEDIS[de-identified]      Not visualized   LEFT GREAT TOE PRESSURE: Goals: 1. Maintain optimal wound healing environment  2. Remove wound bioburden  3.  Decrease periwound edema      PLAN OF CARE:   Dressing changes every other day    WOUND CARE DISCHARGE RECOMMENDATIONS:   Noted above      Thank you for this consultation

## 2019-11-11 NOTE — PAYOR COMM NOTE
--------------  CONTINUED STAY REVIEW    Kvng[facility]ue George TANISHA [facility]  Subscriber #:  [id number]  Authorization Number: [id number]    Admit date: 11/2/19  Admit time: 2321    Admitting Physician: Betty Sherman MD  Attending Physician:  MD MARY KATE Davila oxyCODONE-acetaminophen (PERCOCET) 5-325 MG per tab 1 tablet     Date Action Dose Route User    11/11/2019 1021 Given 1 tablet Oral Grant Richmond RN    11/10/2019 1950 Given 1 tablet Oral Drew Emery, RN      Pantoprazole Sodium (PROTONIX) EC tab 40 HEENT: Moist mucous membranes. Extraocular muscles are intact. Neck: supple no masses  Respiratory: Clear to auscultation bilaterally. No wheezes. No rhonchi. Cardiovascular: S1, S2 RRR  Abdomen: Soft, nontender, nondistended. Positive bowel sounds.   Sharilyn Butter     Mixture of organisms suggestive of normal skin lionel     Aerobic Culture Result 3+ growth Streptococcus Group C (A) N/A     Aerobic Culture Result 2+ growth Staphylococcus aureus, MRSA (A) N/A     Aerobic Smear 2+ WBCs seen N/A     Aerobic Smear 4+ Gra Hosp Day # 5 PCP PHYSICIAN NONSTAFF      Subjective:  States foot pain is controlled.  Denies chest pain or SOB     Objective:  /60 (BP Location: Right arm)   Pulse 81   Temp 98.4 °F (36.9 °C) (Oral)   Resp 24   Ht 4' 9.99\" (1.473 m)   Wt 134 lb 4.8 · PAD, Bilateral infected distal lower extremity wounds, as above - per ID.  Dr. Sue Coleman consult noted - plans for debridement today.  US arterial dopplers with no flow seen below the knee in the right and no flow in posterior tibial and dorsalis pedis -Remains asx. Echo here with normal systolic function with no evidence for regional wall motion abnormalities.  Mild AS  -Stress with small fixed defect     Simone Banuelos MD  11/7/2019  [facility]  Interventional Cardiology            Electronically signed OPERATIVE TECHNIQUE:  Patient was brought to the operating room, placed on the operating table in supine position. General anesthesia was induced by the anesthesiologist, Dr. Sherron Huerta. The patient was on empiric antibiotic therapy.   The patient had bee Chart Review: Note Routing History     No routing history on file.    Routing History      Date/Time From To Method   11/9/2019  6:43 AM MD Tayla Purvis MD In Basket     11/8  Rasheed Chung MD   Physician   Infectious Disease   Prog 4486 11/08/19  0601   *   < > 115* 112* 82 115*  --    BUN 50*   < > 29* 17 15 14  --    CREATSERUM 1.02   < > 0.68  0.68 0.52*  0.52* 0.48*  0.48* 0.44*  0.44* 0.44*   GFRAA 62   < > 99  99 108  108 111  111 114  114 114   GFRNAA 54*   < > 86  86 9 Patient Active Problem List:     Cellulitis of right lower extremity     Open wound of left lower extremity, initial encounter     Open wound of right lower extremity, initial encounter     PAD (peripheral artery disease) ([facility] [address]     AZUCENA (acute kidney injury • piperacillin-tazobactam  3.375 g Intravenous Q8H      I/0 + 3 L     Assessment:      · Bacteremia, strep C  · Necrotic leg wounds, s/p debridement 11/7  · PAD, hx of PTA, bilat disease by CTA and US  · Chest pain, troponin 0.4 peak, lexiscan nuclear stre    Progress Note           Abdifatah Willis Patient Status:  Inpatient    1945 MRN [id number]   [facility] 7NE-A Attending Manuel Patten MD   Hosp Day # 6 PCP PHYSICIAN NONSTAFF         Subjective:   POD #1 bilateral leg I&D     Patient CONCLUSION:  1. In the right leg, no flow is seen below the knee with no hemodynamically significant stenosis identified above the knee.  2. In the left leg, no flow is seen in the posterior tibial artery and dorsal pedal artery with flow identified in the Right lower extremity:  Neurovascular intact, reaonable quad contraction. Thigh soft,  non-tende. Surgical dressings intact, wounds clean, no erythma. 2 cm x 2 cm ulcer on left medial leg.   Dressings changed today, Wound care consulted.     PAIN: moderate • lisinopril  10 mg Oral Daily   • metoprolol tartrate  12.5 mg Oral 2x Daily(Beta Blocker)   • piperacillin-tazobactam  3.375 g Intravenous Q8H         Assessment:      · Bacteremia, strep C  · Necrotic leg wounds, s/p debridement 11/7  · PAD, hx of PTA, 11/08/19 1230 126/60 97.9 °F (36.6 °C) Oral 67 25 97 %   11/08/19 1100 — — — 62 — —         ORTHO EXAM:  Right lower extremity:  Neurovascular intact, reaonable quad contraction. Thigh soft,  non-tende.  Surgical dressings dry and intact, legs elevated on    Microbiology  [facility] Encounter on 11/02/19   1. BLOOD CULTURE     Status: None     Collection Time: 11/03/19  4:56 PM   Result Value Ref Range     Blood Culture Result No Growth 5 Days N/A   2.  AEROBIC BACTERIAL CULTURE Chart Review: Note Routing History     No routing history on file.    DIAN Hung   Cardiology   Progress Notes   Addendum   Date of Service:  11/10/2019 11:54 AM                    [facility] Cardiology Progress Note     Subjective:  No complain

## 2019-11-11 NOTE — PLAN OF CARE
Dr. Jim Plasencia notified, re: pt's hgb result. Order received to repeat cbc at 0800, call Dr. Kaleb Cohen w/ results. Updated w/ urine retention as well.

## 2019-11-11 NOTE — PLAN OF CARE
Assumed care at [address] [facility]. Pt a/ox4, forgetful. Icelandic speaking. VSS. NSR per tele. RA. Percocet prn given for c/o leg pain w/ relief. Dr. Daniella Mandel notified re: urine retention, orders received. 500ml bolus given per order.   Mcgowan catheter inserted w/ steril injury  Description  INTERVENTIONS:  - Assess pt frequently for physical needs  - Identify cognitive and physical deficits and behaviors that affect risk of falls.   - [place] fall precautions as indicated by assessment.  - Educate pt/family on patient sa Activities of Daily Living  Goal: Achieve highest/safest level of independence in self care  Description  Interventions:  - Assess ability and encourage patient to participate in ADLs to maximize function  - Promote sitting position while performing ADLs s

## 2019-11-11 NOTE — PHYSICAL THERAPY NOTE
Attempted to see pt for PT but Rn requested to hold PT due to Hgb 6.8 and planning to get blood transfusion.

## 2019-11-11 NOTE — PROGRESS NOTES
[facility]  Cardiology Progress Note    Subjective:  No chest pain or shortness of breath. Resting quietly in bed.       Objective:  /48 (BP Location: Left arm)   Pulse 68   Temp 97.9 °F (36.6 °C) (Oral)   Resp (!) 33   Ht 4' 9.99\" (1.473 m) (S): 39mm Hg. Valve area (VTI): 1.81cm^2. Indexed valve area     (VTI): 1.18cm^2/m^2.  3. Mitral valve: Mildly to moderately calcified annulus. There was mild     regurgitation. 4. Left atrium: The left atrium was mildly enlarged.   5. Right atrium: The at wounds, as above - per ID. Dr. Indra Negrete following, s/p debridement 11/7/19  · Chest pain, ? etiology - denies any complaints of this since admit. Heparin stopped, remains pain free. Mildly elev troponin not c/w ACS.   Echo with preserved LVSF and no R

## 2019-11-12 PROCEDURE — 99232 SBSQ HOSP IP/OBS MODERATE 35: CPT | Performed by: INTERNAL MEDICINE

## 2019-11-12 NOTE — PROGRESS NOTES
[facility]                INFECTIOUS DISEASE PROGRESS NOTE    Sameera Passer Patient Status:  Inpatient    1945 MRN [id number]   [facility] 7NE-A Attending Christi Solorzano MD   Hosp Day # 10 PCP PHYSICIAN NONSTAFF     Antibiot 11/10/2019 14.1     Microbiology    Hospital Encounter on 11/02/19   1. BLOOD CULTURE     Status: None    Collection Time: 11/03/19  4:56 PM   Result Value Ref Range    Blood Culture Result No Growth 5 Days N/A   2.  AEROBIC BACTERIAL CULTURE     Status:

## 2019-11-12 NOTE — PROGRESS NOTES
[facility]  Progress Note     Dianne Perales Patient Status:  Inpatient    1945 MRN [id number]   [facility] 7NE-A Attending Lyndsay Avina MD   Hosp Day # 8 PCP PHYSICIAN NONSTAFF     Chief Complaint: some leg pain    S: Patie --  142  --    K 3.4* 3.3*   < > 4.4  --   --  3.5 3.4*    110  --   --   --   --  113*  --    CO2 22.0 21.0  --   --   --   --  23.0  --     < > = values in this interval not displayed.        Estimated Creatinine Clearance: 97.4 mL/min (A) (based on Progress  At this point Ms. Shannan Christie is expected to be discharge to: TBD     D/w pt and daughter, RN by bedside.      Luis Fernando Irwin MD      \

## 2019-11-12 NOTE — PHYSICAL THERAPY NOTE
PHYSICAL THERAPY TREATMENT NOTE - INPATIENT    Room Number: 2141/9333-P     Session: 2   Number of Visits to Meet Established Goals: 4    Presenting Problem:  B LE ulcers    Problem List  Principal Problem:    Cellulitis of right lower extremity  Active Pr sitting on the side of the bed?: A Little   How much help from another person does the patient currently need. ..   -   Moving to and from a bed to a chair (including a wheelchair)?: A Lot   -   Need to walk in hospital room?: Total   -   Climbing 3-5 steps Good  Frequency (Obs): 5x/week    CURRENT GOALS   Goal #1 Patient is able to demonstrate supine - sit EOB @ level: minimum assistance      Goal #2 Patient is able to demonstrate transfers Sit to/from Stand at assistance level: minimum assistance to rw

## 2019-11-12 NOTE — PROGRESS NOTES
[facility]  Cardiology Progress Note    Subjective:  No chest pain or shortness of breath.     Objective:  /45 (BP Location: Left arm)   Pulse (!) 45   Temp 98.5 °F (36.9 °C) (Oral)   Resp 22   Ht 4' 9.99\" (1.473 m)   Wt 134 lb 4.8 oz (60.9 kg) Mildly to moderately calcified annulus. There was mild     regurgitation. 4. Left atrium: The left atrium was mildly enlarged.   5. Right atrium: The atrium was mildly dilated.     Impressions:  No previous study was available for comparison     Telemetry: denies any complaints of this since admit.  Heparin stopped, remains pain free.  Mildly elev troponin not c/w ACS.  Echo with preserved LVSF and no RWMA.   · Mildly elevated troponin - placed on heparin gtt on admit, stopped without recurrent pain.  Echo as MD  11/12/2019  [facility]  Interventional Cardiology

## 2019-11-12 NOTE — PLAN OF CARE
Assumed care at [address]. Pt a/ox4, [place] Islander speaking, family at [facility]. VSS. NSR/SB per tele. RA. . Oxy prn given for leg pain w/ relief. BLE dressing c/d/I. On IV abx. Contact isolation maintained. Mcgowan catheter intact draining clear yellow urine.   Turn injury  Description  INTERVENTIONS:  - Assess pt frequently for physical needs  - Identify cognitive and physical deficits and behaviors that affect risk of falls.   - [place] fall precautions as indicated by assessment.  - Educate pt/family on patient sa Activities of Daily Living  Goal: Achieve highest/safest level of independence in self care  Description  Interventions:  - Assess ability and encourage patient to participate in ADLs to maximize function  - Promote sitting position while performing ADLs s

## 2019-11-12 NOTE — OCCUPATIONAL THERAPY NOTE
OCCUPATIONAL THERAPY TREATMENT NOTE - INPATIENT     Room Number: 4198/4636-J  Session: 1   Number of Visits to Meet Established Goals: 7    Presenting Problem: RLE cellulitis, BLE wounds; PAD, AZUCENA    History related to current admission:  Patient is a 74y/ (peripheral artery disease) ([facility] [address]    AZUCENA (acute kidney injury) ([facility] [address]    Hyponatremia    Anemia    Urinary retention      Past Medical History  Past Medical History:   Diagnosis Date   • Essential hypertension    • Hyperlipidemia        Past Surgical Histor Patient End of Session: Up in chair; With [facility] staff;Needs met;Call light within reach;RN aware of session/findings; All patient questions and concerns addressed; Family present    ASSESSMENT   Pt continues to work toward OT goals.   Current limitations: pain

## 2019-11-12 NOTE — PLAN OF CARE
Received patient today alert and oriented x3. Family at beside translating. BLE wounds coved with dressing. Dressing will be changed tomorrow per order of Q other day. IV iron given as ordered. Family updated on plan of care.  Will continue to monitor close

## 2019-11-12 NOTE — CM/SW NOTE
Interdisciplinary Rounds: 11/12/19  Admitted: 11/2/2019 LOS: 10  Disciplines in attendance: charge nurse, staff nurse, CM, [address] and discharge plan reviewed.     Active issues needing resolution prior to discharge: Per rounds and chart review, th

## 2019-11-13 ENCOUNTER — ANESTHESIA EVENT (OUTPATIENT)
Dept: ENDOSCOPY | Facility: HOSPITAL | Age: 74
DRG: 854 | End: 2019-11-13
Payer: MEDICARE

## 2019-11-13 PROCEDURE — 99233 SBSQ HOSP IP/OBS HIGH 50: CPT | Performed by: INTERNAL MEDICINE

## 2019-11-13 PROCEDURE — 99232 SBSQ HOSP IP/OBS MODERATE 35: CPT | Performed by: HOSPITALIST

## 2019-11-13 RX ORDER — POTASSIUM CHLORIDE 20 MEQ/1
40 TABLET, EXTENDED RELEASE ORAL EVERY 4 HOURS
Status: COMPLETED | OUTPATIENT
Start: 2019-11-13 | End: 2019-11-14

## 2019-11-13 RX ORDER — SENNOSIDES 8.6 MG
8.6 TABLET ORAL DAILY
Status: DISCONTINUED | OUTPATIENT
Start: 2019-11-13 | End: 2019-11-16

## 2019-11-13 NOTE — PAYOR COMM NOTE
--------------  CONTINUED STAY REVIEW    PayorRomana Marks COMPLETE [employer]  Subscriber #:  [id number]  Authorization Number: [id number]    Admit date: 11/2/19  Admit time: 2321    Admitting Physician: Michael Roman MD  Attending Physician:  MD MARY KATE Land Temp:  [97.5 °F (36.4 °C)-98.6 °F (37 °C)] 98.4 °F (36.9 °C)  Pulse:  [44-90] 67  Resp:  [16-24] 16  BP: (101-141)/(39-71) 140/58    Lab 11/11/19  0751   11/12/19  0539   RBC 2.24*  --  2.46*   HGB 6.8*   < > 7.5*   HCT 21.3*  --  23.6*   MCV 95.1  --  95. [id number]-[id number]   2100          aspirin tab 325 mg   Dose: 325 mg  Freq: Daily Route: OR  Start: 11/03/19 0115 End: 11/08/19 1629         Clopidogrel Bisulfate (PLAVIX) tab 75 mg   Dose: 75 mg  Freq: Daily Route: OR  Start: 11/03/19 0700    0813-Given       potassium chloride 40 mEq in sodium chloride 0.9% 250 mL IVPB   Dose: 40 mEq  Freq:  Once Route: IV  Start: 11/11/19 1130 End: 11/11/19 1753     (1130)-Not Given   (1530)-Not Given   1753-D/C'd       sodium chloride 0.9% IV bolus 250 mL   Dose: 250 mL  Freq FLEET ENEMA (FLEET) 7-19 GM/118ML enema 133 mL   Dose: 1 enema  Freq:  Once as needed Route: RE  PRN Reason: constipation  Start: 11/07/19 2156         HYDROmorphone HCl (DILAUDID) 1 MG/ML injection 0.2 mg   Dose: 0.2 mg  Freq: Every 2 hour PRN Route: I PRN Reason: constipation  Start: 11/07/19 2962 3430-Given                  FOR CONTINUED REVIEW/APPROVAL OF INPT ADMISSION

## 2019-11-13 NOTE — PROGRESS NOTES
[facility]  Cardiology Progress Note    Michaelastanislav Chaojonas Patient Status:  Inpatient    1945 MRN [id number]   [facility] 7NE-A Attending Jacky Mclaughlin MD   Hosp Day # 11 PCP PHYSICIAN NONSTAFF     Subjective:  Pain is controlled. stopped, remains pain free.  Mildly elev troponin not c/w ACS.  Echo with preserved LVSF and no RWMA.   · Mildly elevated troponin - placed on heparin gtt on admit, stopped without recurrent pain.  Echo as above.  Likely trivially elevated in setting of sep today.   We will tentatively plan for peripheral angiogram on Friday    Lo Ortiz MD  11/13/2019  [facility]  Interventional Cardiology

## 2019-11-13 NOTE — PROGRESS NOTES
[facility]  Progress Note     [facility] Patient Status:  Inpatient    1945 MRN [id number]   [facility] 7NE-A Attending Dean Dowling MD   Hosp Day # 6 PCP PHYSICIAN NONSTAFF     Chief Complaint: some leg pain    S: Patien Clearance: 111.3 mL/min (A) (based on SCr of 0.42 mg/dL (L)). No results for input(s): PTP, INR in the last 168 hours. No results for input(s): TROP, CK in the last 168 hours. Imaging: Imaging data reviewed in Epic.     Medications:   • iron s she has hematochezia, will ask GI to evaluate for risk stratification of bleeding given anti-platelets.     Maria De Jesus Simpson MD  [facility]

## 2019-11-13 NOTE — PLAN OF CARE
Discussed with LORNA Timmons and Dr. Cheryl Pena for EGD/colonosocpy tomorrow.  Patient is at moderate cardiac risk to proceed with EGD/colonosocpy tomorrow with MAC.   2:41 PM

## 2019-11-13 NOTE — DIETARY NOTE
[facility]     Admitting diagnosis:  PVD (peripheral vascular disease) ([facility] [address] [I73.9]  Cellulitis of right lower extremity [Z48.634]  Open wound of right lower extremity, initial encounter [D59.910A]  Open wound o

## 2019-11-13 NOTE — PLAN OF CARE
Pt stable overnight. Pain to ble tolerable w/ prn meds. VSS. Afebrile. IV abx cont as ordered. Mcgowan intact w/   meenakshi colored urine. No bm yet. Pt t/r for comfort. All safety precautions in place. Will monitor.     Problem: Patient/Family Goals  Goal: Patien Sheldon fall precautions as indicated by assessment.  - Educate pt/family on patient safety including physical limitations  - Instruct pt to call for assistance with activity based on assessment  - Modify environment to reduce risk of injury  - Provide a

## 2019-11-13 NOTE — PROGRESS NOTES
[facility]                INFECTIOUS DISEASE PROGRESS NOTE    Akira Mcnamara Patient Status:  Inpatient    1945 MRN [id number]   [facility] 7NE-A Attending Alize Araiza MD   Hosp Day # 6 PCP PHYSICIAN NONSTAFF     Antibiot Culture Result No Growth 5 Days N/A   2.  AEROBIC BACTERIAL CULTURE     Status: Abnormal    Collection Time: 11/02/19  8:06 PM   Result Value Ref Range    Aerobic Culture Result  N/A     Mixture of organisms suggestive of normal skin lionel    Aerobic Cultur

## 2019-11-13 NOTE — ANESTHESIA PREPROCEDURE EVALUATION
PRE-OP EVALUATION    Patient Name: Myrna Lowry    Pre-op Diagnosis: Iron deficiency anemia, unspecified iron deficiency anemia type [D50.9]  Hematochezia [K92.1]    Procedure(s):  ESOPHAGOGASTRODUODENOSCOPY (EGD), COLONOSCOPY      Surgeon(s) and Role: PRN  magnesium hydroxide (MILK OF MAGNESIA) 400 MG/5ML suspension 30 mL, 30 mL, Oral, Daily PRN  bisacodyl (DULCOLAX) rectal suppository 10 mg, 10 mg, Rectal, Daily PRN  FLEET ENEMA (FLEET) 7-19 GM/118ML enema 133 mL, 1 enema, Rectal, Once PRN  aspirin tab 3.375 g, Intravenous, Q8H  [COMPLETED] Vancomycin HCl (VANCOCIN) 1,500 mg in sodium chloride 0.9% 500 mL IVPB, 25 mg/kg, Intravenous, Once  [] Heparin Sodium (Porcine) 5000 UNIT/ML injection, , ,   [COMPLETED] HYDROmorphone HCl (DILAUDID) 1 MG/ML in 11/13/2019     Lab Results   Component Value Date     11/11/2019    K 3.4 (L) 11/13/2019     (H) 11/11/2019    CO2 23.0 11/11/2019    BUN 14 11/11/2019    CREATSERUM 0.42 (L) 11/13/2019    GLU 99 11/11/2019    CA 7.6 (L) 11/11/2019            A

## 2019-11-13 NOTE — CONSULTS
[facility]                       Gastroenterology Consultation-[facility] Gastroenterology    [facility] Patient Status:  Inpatient    1945 MRN [id number]   [facility] 7NE-A Attending Gisell Ramos MD   [facility] Day Oral, Daily  [COMPLETED] potassium chloride 40 mEq in sodium chloride 0.9% 250 mL IVPB, 40 mEq, Intravenous, Once  iron sucrose (VENOFER) IV Push 200 mg, 200 mg, Intravenous, Daily  [COMPLETED] 0.9% NaCl infusion, , Intravenous, Once  Pantoprazole Sodium ( injection, , ,   [COMPLETED] bacitracin (BACIIM) 150,000 Units in sodium chloride 0.9 % 3,000 mL irrigation, 150,000 Units, Irrigation, Once (Intra-Op)  [COMPLETED] Potassium Chloride ER (K-DUR M20) CR tab 40 mEq, 40 mEq, Oral, Q4H  [COMPLETED] iohexol (OM mL, 30 mL/kg, Intravenous, Continuous        Allergies: No Known Allergies    SocHx:        Smoking status: Never Smoker      Smokeless tobacco: Never Used      Alcohol use: No      Drug use: No      FamHx: The patient has no family history of colon cancer bilaterally without wheezes; rubs, rhonchi, or rales    Abdomen: Soft, non-tender, non-distended with the presence of bowel sounds; No hepatosplenomegaly; no rebound or guarding;  No ascites is clinically apparent; no tympany to percussion    Rectum: large rectal exam with large external hemorrhoids however pt has never had an endoscopic eval  3. Step C bacteremia d/t extensive bilateral leg wounds  4. PAD: on Full strength ASA BID with Plavix; plan for peripheral angiogram on 11/15  5.  Elevated troponin lev

## 2019-11-14 ENCOUNTER — ANESTHESIA (OUTPATIENT)
Dept: ENDOSCOPY | Facility: HOSPITAL | Age: 74
DRG: 854 | End: 2019-11-14
Payer: MEDICARE

## 2019-11-14 ENCOUNTER — APPOINTMENT (OUTPATIENT)
Dept: CARDIOLOGY | Age: 74
End: 2019-11-14

## 2019-11-14 PROCEDURE — 99233 SBSQ HOSP IP/OBS HIGH 50: CPT | Performed by: INTERNAL MEDICINE

## 2019-11-14 PROCEDURE — 0DJD8ZZ INSPECTION OF LOWER INTESTINAL TRACT, VIA NATURAL OR ARTIFICIAL OPENING ENDOSCOPIC: ICD-10-PCS | Performed by: INTERNAL MEDICINE

## 2019-11-14 PROCEDURE — 0DJ08ZZ INSPECTION OF UPPER INTESTINAL TRACT, VIA NATURAL OR ARTIFICIAL OPENING ENDOSCOPIC: ICD-10-PCS | Performed by: INTERNAL MEDICINE

## 2019-11-14 PROCEDURE — 99232 SBSQ HOSP IP/OBS MODERATE 35: CPT | Performed by: HOSPITALIST

## 2019-11-14 RX ORDER — NALOXONE HYDROCHLORIDE 0.4 MG/ML
80 INJECTION, SOLUTION INTRAMUSCULAR; INTRAVENOUS; SUBCUTANEOUS AS NEEDED
Status: DISCONTINUED | OUTPATIENT
Start: 2019-11-14 | End: 2019-11-14 | Stop reason: HOSPADM

## 2019-11-14 RX ORDER — LIDOCAINE HYDROCHLORIDE 10 MG/ML
INJECTION, SOLUTION EPIDURAL; INFILTRATION; INTRACAUDAL; PERINEURAL AS NEEDED
Status: DISCONTINUED | OUTPATIENT
Start: 2019-11-14 | End: 2019-11-14 | Stop reason: SURG

## 2019-11-14 RX ORDER — SODIUM CHLORIDE, SODIUM LACTATE, POTASSIUM CHLORIDE, CALCIUM CHLORIDE 600; 310; 30; 20 MG/100ML; MG/100ML; MG/100ML; MG/100ML
INJECTION, SOLUTION INTRAVENOUS CONTINUOUS PRN
Status: DISCONTINUED | OUTPATIENT
Start: 2019-11-14 | End: 2019-11-14 | Stop reason: SURG

## 2019-11-14 RX ORDER — SODIUM CHLORIDE, SODIUM LACTATE, POTASSIUM CHLORIDE, CALCIUM CHLORIDE 600; 310; 30; 20 MG/100ML; MG/100ML; MG/100ML; MG/100ML
INJECTION, SOLUTION INTRAVENOUS CONTINUOUS
Status: DISCONTINUED | OUTPATIENT
Start: 2019-11-14 | End: 2019-11-16

## 2019-11-14 RX ORDER — SODIUM CHLORIDE 9 MG/ML
INJECTION, SOLUTION INTRAVENOUS CONTINUOUS
Status: DISCONTINUED | OUTPATIENT
Start: 2019-11-15 | End: 2019-11-16

## 2019-11-14 RX ADMIN — LIDOCAINE HYDROCHLORIDE 50 MG: 10 INJECTION, SOLUTION EPIDURAL; INFILTRATION; INTRACAUDAL; PERINEURAL at 13:36:00

## 2019-11-14 RX ADMIN — SODIUM CHLORIDE, SODIUM LACTATE, POTASSIUM CHLORIDE, CALCIUM CHLORIDE: 600; 310; 30; 20 INJECTION, SOLUTION INTRAVENOUS at 13:28:00

## 2019-11-14 RX ADMIN — SODIUM CHLORIDE, SODIUM LACTATE, POTASSIUM CHLORIDE, CALCIUM CHLORIDE: 600; 310; 30; 20 INJECTION, SOLUTION INTRAVENOUS at 14:13:00

## 2019-11-14 NOTE — PROGRESS NOTES
[facility]  Progress Note     Cira Rebolledo Patient Status:  Inpatient    1945 MRN [id number]   [facility] 7NE-A Attending Chanel Combs MD   Hosp Day # 12 PCP PHYSICIAN NONSTAFF     Chief Complaint: some leg pain    S: Patien Estimated Creatinine Clearance: 101.6 mL/min (A) (based on SCr of 0.46 mg/dL (L)). Recent Labs   Lab 11/14/19  0436   PTP 14.6   INR 1.09       No results for input(s): TROP, CK in the last 168 hours.          Imaging: Imaging data reviewed in Epic then plans for angio tomorrow per cards. Gerardo GRANT  8:58 AM     As above, no new issues, await scopes.     Sylwia Freeman MD  [facility]

## 2019-11-14 NOTE — PROGRESS NOTES
[facility]  Cardiology Progress Note    Keesha Chang Patient Status:  Inpatient    1945 MRN [id number]   [facility] 7NE-A Attending Josemanuel Johnson MD   Hosp Day # 12 PCP PHYSICIAN NONSTAFF     Subjective:  Receiving golytely pre right PA, PT occluded, AT/peroneal stenosed but patent; left peroneal completely occluded, post tib occluded. Tentative plans for peripheral angiogram Friday. · Chest pain - denies any complaints of this since admit.  Heparin stopped, remains pain free. reconstitutes          distally and there is one-vessel runoff with AT   -Empiric antibiotics for bacteremia for group C strep bacteremia, continuing vancomycin and Zosyn for 2 weeks postoperatively  -s/p debridement with Ortho.    -Hemoglobin appears to h

## 2019-11-14 NOTE — OPERATIVE REPORT
Operative Report-Esophagogastroduodenoscopy      PREOPERATIVE DIAGNOSIS/INDICATION: Hematochezia, anemia    POSTOPERTATIVE DIAGNOSIS: Irregular Z line, paraesophageal hernia, pyloric erosions     PROCEDURE PERFORMED: EGD    INFORME

## 2019-11-14 NOTE — PROGRESS NOTES
[address] Patient Status:  Inpatient    1945 MRN [id number]   [facility] 7NE-A Attending Alanis Aguirre MD   [facility] Day # 15 PCP PHYSICIAN NGOC Paz is a 75year old female patient.     Patient Ac

## 2019-11-14 NOTE — PLAN OF CARE
Norco given for severe leg pain x1. Singaporean speaking. Golytely given to pt throughout night. Not clear at 5:30. GI paged. GI- to keep drinking golytely until 7am then page back if not clear and will order enemas. No blood or dark stools noted. K replaced.

## 2019-11-14 NOTE — ANESTHESIA POSTPROCEDURE EVALUATION
[address] Patient Status:  Inpatient   Age/Gender 75year old female MRN [id number]   Location [facility] Attending Jacky Mclaughlin MD   Hosp Day # 12 PCP PHYSICIAN NONSTAFF       Anesthesia Post-op Note    Procedure(s)

## 2019-11-14 NOTE — CDS QUERY
Clarification of Procedure- Debridement  Clinical Documentation Clarification Form  Dear Dr. Ravi Reza,  Clinical information (provided below) indicates a debridement was done to infected ulcers of the right and left lower legs.  For accurate ICD-10-PCS code as exsanguinated following being prepped and draped. A foul odor was present and the devitalized tissue was discarded. Dissection continued sharply down to the deep tissues which were well perfused with blood. All the dead tissue was removed.   The wounds w

## 2019-11-14 NOTE — OPERATIVE REPORT
Operative Report-Colonoscopy    PREOPERATIVE DIAGNOSIS/INDICATION: Anemia, hematochezia    POSTOPERTATIVE DIAGNOSIS: Internal hemorrhoids, external hemorrhoids, cecal ulceration, colon polyps, diverticulosis cause of hematochezia    - Repeat colonoscopy as an outpatient can be considered for polypectomy. GI will sign off at this time. Please call back with any questions or concerns.        Linda Nguyễn  11/14/2019  2:08 PM

## 2019-11-14 NOTE — PHYSICAL THERAPY NOTE
PHYSICAL THERAPY TREATMENT NOTE - INPATIENT    Room Number: 5894/7052-M     Session: 3   Number of Visits to Meet Established Goals: 4    Presenting Problem:  B LE ulcers    Problem List  Principal Problem:    Cellulitis of right lower extremity  Active Pr person does the patient currently need. ..   -   Moving to and from a bed to a chair (including a wheelchair)?: A Lot   -   Need to walk in hospital room?: Total   -   Climbing 3-5 steps with a railing?: Total       AM-PAC Score:  Raw Score: 12   Approx Deg 5x/week    CURRENT GOALS   Goal #1 Patient is able to demonstrate supine - sit EOB @ level: minimum assistance      Goal #2 Patient is able to demonstrate transfers Sit to/from Stand at assistance level: minimum assistance to rw      Goal #3 Patient is abl

## 2019-11-14 NOTE — WOUND PROGRESS NOTE
[facility]  Inpatient Wound Care Contact Note    Keesha Chang Patient Status:  Inpatient    1945 MRN [id number]   [facility] 7NE-A Attending Josemanuel Johnson MD   Hosp Day # 12 PCP PHYSICIAN NONSTAFF     Received call from amnoj OZUNA

## 2019-11-14 NOTE — PROGRESS NOTES
[facility]                INFECTIOUS DISEASE PROGRESS NOTE    Ekaterinabrandon Alexander Patient Status:  Inpatient    1945 MRN [id number]   [facility] 7NE-A Attending Yari Moeller MD   Hosp Day # 12 PCP PHYSICIAN NONSTAFF     Antibiot Group C (A) N/A    Aerobic Culture Result 2+ growth Staphylococcus aureus, MRSA (A) N/A    Aerobic Smear 2+ WBCs seen N/A    Aerobic Smear 4+ Gram Positive Cocci N/A    Aerobic Smear 4+ Gram Negative Rods N/A       Susceptibility    Staphylococcus aureus,

## 2019-11-14 NOTE — PLAN OF CARE
Assumed care at 0700. Pt A/O x4, Irish speaking only. RA. NSR/SB on tele. VSS. Pulses per doppler. BLE dressings changed. Mcgowan for urinary retention. IV abx per MAR. Pt complaining of pain rated at 7/10. PRN Hydrocodone given with some relief.  Blood n

## 2019-11-15 ENCOUNTER — APPOINTMENT (OUTPATIENT)
Dept: INTERVENTIONAL RADIOLOGY/VASCULAR | Facility: HOSPITAL | Age: 74
DRG: 854 | End: 2019-11-15
Attending: NURSE PRACTITIONER
Payer: MEDICARE

## 2019-11-15 ENCOUNTER — TELEPHONE (OUTPATIENT)
Dept: CARDIOLOGY | Age: 74
End: 2019-11-15

## 2019-11-15 PROBLEM — Z47.89 ORTHOPEDIC AFTERCARE: Status: ACTIVE | Noted: 2019-11-15

## 2019-11-15 PROCEDURE — 75710 ARTERY X-RAYS ARM/LEG: CPT | Performed by: INTERNAL MEDICINE

## 2019-11-15 PROCEDURE — 99232 SBSQ HOSP IP/OBS MODERATE 35: CPT | Performed by: HOSPITALIST

## 2019-11-15 PROCEDURE — 75625 CONTRAST EXAM ABDOMINL AORTA: CPT | Performed by: INTERNAL MEDICINE

## 2019-11-15 PROCEDURE — 36245 INS CATH ABD/L-EXT ART 1ST: CPT | Performed by: INTERNAL MEDICINE

## 2019-11-15 PROCEDURE — 99152 MOD SED SAME PHYS/QHP 5/>YRS: CPT | Performed by: INTERNAL MEDICINE

## 2019-11-15 PROCEDURE — B41DYZZ FLUOROSCOPY OF AORTA AND BILATERAL LOWER EXTREMITY ARTERIES USING OTHER CONTRAST: ICD-10-PCS | Performed by: INTERNAL MEDICINE

## 2019-11-15 RX ORDER — MIDAZOLAM HYDROCHLORIDE 1 MG/ML
INJECTION INTRAMUSCULAR; INTRAVENOUS
Status: COMPLETED
Start: 2019-11-15 | End: 2019-11-15

## 2019-11-15 RX ORDER — HEPARIN SODIUM 5000 [USP'U]/ML
INJECTION, SOLUTION INTRAVENOUS; SUBCUTANEOUS
Status: COMPLETED
Start: 2019-11-15 | End: 2019-11-15

## 2019-11-15 RX ORDER — LIDOCAINE HYDROCHLORIDE 10 MG/ML
INJECTION, SOLUTION EPIDURAL; INFILTRATION; INTRACAUDAL; PERINEURAL
Status: COMPLETED
Start: 2019-11-15 | End: 2019-11-15

## 2019-11-15 NOTE — PROGRESS NOTES
No complaints- son Evans present with other family members- plan for angiogram tomorrow to mostly assess infrapopliteal disease. Told arterial flow may be adequate- but need to check. All questions answered.  Risk/ complications of procedure explained as wel

## 2019-11-15 NOTE — PROGRESS NOTES
[facility]                INFECTIOUS DISEASE PROGRESS NOTE    All Cross Patient Status:  Inpatient    1945 MRN [id number]   [facility] 7NE-A Attending Alanis Aguirre MD   Hosp Day # 15 PCP PHYSICIAN NONSTAFF     Antibiot Mixture of organisms suggestive of normal skin lionel    Aerobic Culture Result 3+ growth Streptococcus Group C (A) N/A    Aerobic Culture Result 2+ growth Staphylococcus aureus, MRSA (A) N/A    Aerobic Smear 2+ WBCs seen N/A    Aerobic Smear 4+ Gram Po

## 2019-11-15 NOTE — PLAN OF CARE
Assumed care at 1900. VSS. Consent for angio obtained. Pulses by doppler  Turned and repositioned frequently. Pt reports pain, medication given with relief. NPO after midnight.            Problem: Patient/Family Goals  Goal: Patient/Family Long Term precautions as indicated by assessment.  - Educate pt/family on patient safety including physical limitations  - Instruct pt to call for assistance with activity based on assessment  - Modify environment to reduce risk of injury  - Provide assistive device to maximize function  - Promote sitting position while performing ADLs such as feeding, grooming, and bathing  - Educate and encourage patient/family in tolerated functional activity level and precautions during self-care     Outcome: Progressing

## 2019-11-15 NOTE — PROGRESS NOTES
[facility]  Progress Note     Jatin Vieira Patient Status:  Inpatient    1945 MRN [id number]   [facility] 7NE-A Attending Sami Durant MD   Hosp Day # 15 PCP PHYSICIAN NONSTAFF     Chief Complaint: some leg pain    S: Patien 23.0  --   --  26.0  --        Estimated Creatinine Clearance: 111.3 mL/min (A) (based on SCr of 0.42 mg/dL (L)). Recent Labs   Lab 11/14/19  0436   PTP 14.6   INR 1.09       No results for input(s): TROP, CK in the last 168 hours.          Imaging: Imag Anali GRANT  8:36 AM     As above, no new issues, await angio today.     Ofelia Valencia MD  [facility]

## 2019-11-15 NOTE — PROCEDURES
[facility]    [facility] Cardiac Cath Procedure Note  Canal Fultonhugo Willis Patient Status:  Inpatient    1945 MRN [id number]   Location [address] [facility] Attending Prashant Solorzano MD   Hosp Day # 15 PCP PHYSICIAN NONSTAFF       Cardio groin for local anesthesia and a 5 Vietnamese introducer sheath was inserted into the  right femoral artery.       An Omni Flush catheter was advanced over a wire and aortoiliac runoff angiogram was obtained  A crossover catheter was then brought up and around

## 2019-11-15 NOTE — OCCUPATIONAL THERAPY NOTE
OCCUPATIONAL THERAPY TREATMENT NOTE - INPATIENT     Room Number: 4646/8533-T  Session: 2   Number of Visits to Meet Established Goals: 7    Presenting Problem: RLE cellulitis, BLE wounds; PAD, AZUCENA    History related to current admission: Patient is a 73y/o (peripheral artery disease) ([facility] [address]    AZUCENA (acute kidney injury) ([facility] [address]    Hyponatremia    Anemia    Urinary retention      Past Medical History  Past Medical History:   Diagnosis Date   • Essential hypertension    • Hyperlipidemia        Past Surgical Histor scooting and hand/foot placement. B lower legs covered with dressing. Mod A to shift weight and to pivot transfer to the chair. Difficulty with lifting her L foot off the floor. Pt pivoted on her L foot.   Pt prefers to have a pillow under her legs befor

## 2019-11-15 NOTE — PLAN OF CARE
Patient awake, alert and oriented x4  Primary language; Indian; computer  used; ID # [id number]  Telemetry, Sinus Rhythm; occasional sinus tachycardia HR 100s-115's with exertion; asymptomatic  NPO for Peripheral angiogram today   C/o bilateral low Anticipate increased pain with activity and pre-medicate as appropriate  Outcome: Progressing     Problem: RISK FOR INFECTION - ADULT  Goal: Absence of fever/infection during anticipated neutropenic period  Description  INTERVENTIONS  - Monitor WBC  - Admi of mobility/gait  Description  Interventions:  - Assess patient's functional ability and stability  - Promote increasing activity/tolerance for mobility and gait  - Educate and engage patient/family in tolerated activity level and precautions  - Recommend

## 2019-11-15 NOTE — PLAN OF CARE
Assumed care at 0700. Pt A/O x4, Divehi speaking only. RA. NSR/SB on tele. VSS. Golytley bottle completed, GI notified. Colonoscopy consent obtained. Colonoscopy completed. Pain managed with PRN Oxy.  RLE dressing changed,  passed on to night shift RN to Administer growth factors as ordered  - Implement neutropenic guidelines  Outcome: Progressing     Problem: SAFETY ADULT - FALL  Goal: Free from fall injury  Description  INTERVENTIONS:  - Assess pt frequently for physical needs  - Identify cognitive and p Recommend use of  RW for transfers and ambulation and assist x 2  - pain medication prior to mobility skills   Outcome: Progressing     Problem: Impaired Activities of Daily Living  Goal: Achieve highest/safest level of independence in self care  [address] [facility]

## 2019-11-15 NOTE — PLAN OF CARE
Assumed care at 0700. Pt A/O x4, Vietnamese speaking only. RA. NSR/SB on tele. VSS. Golytley bottle completed, GI notified. Colonoscopy consent obtained. Colonoscopy completed. Pain managed with PRN Oxy. RLE dressing changed  Due to it becoming soiled.  Puls ordered  - Implement neutropenic guidelines  Outcome: Progressing     Problem: SAFETY ADULT - FALL  Goal: Free from fall injury  Description  INTERVENTIONS:  - Assess pt frequently for physical needs  - Identify cognitive and physical deficits and behavior and ambulation and assist x 2  - pain medication prior to mobility skills   Outcome: Progressing     Problem: Impaired Activities of Daily Living  Goal: Achieve highest/safest level of independence in self care  Description  Interventions:  - Assess abilit

## 2019-11-16 PROCEDURE — 99232 SBSQ HOSP IP/OBS MODERATE 35: CPT | Performed by: HOSPITALIST

## 2019-11-16 RX ORDER — SENNOSIDES 8.6 MG
8.6 TABLET ORAL DAILY PRN
Status: DISCONTINUED | OUTPATIENT
Start: 2019-11-16 | End: 2019-11-27

## 2019-11-16 NOTE — PLAN OF CARE
Pt is alert & oriented x 4. With use of  able to complete assessment. Pt has intermittent pain which is relieved with position changes & pain med. Dressing to be changed tomorrow.  Still awaiting pt to void with brush dc'd, also awaiting insuranc Discharge to home or other facility with appropriate resources  Description  INTERVENTIONS:  - Identify barriers to discharge w/pt and caregiver  - Include patient/family/discharge partner in discharge planning  - Arrange for needed discharge resources and

## 2019-11-16 NOTE — PLAN OF CARE
No issues overnight. Right groin site soft, no hematoma noted. Some pain overnight treated with percocet and dilaudid. Pulses by doppler. Will monitor.

## 2019-11-16 NOTE — PROGRESS NOTES
[facility] dosing service    Follow-up Pharmacokinetic Consult for Vancomycin Dosing     Caprice Layton is a 75year old female who is being treated for cellulitis. Patient is on day 12 of Vancomycin and is currently receiving 1 gm IV Q 18 hours.   Goal Sensitive      Levofloxacin 4 Resistant      Oxacillin >=4 Resistant      Tetracycline <=1 Sensitive      Trimethoprim/Sulfa >=320 Resistant      Vancomycin 1 Sensitive        Based on the above:    1.  Increase Vancomycin to 1.25 gm IVPB Q 18 hours (based

## 2019-11-16 NOTE — PROGRESS NOTES
Cardiology    Reviewed with RN - no new issues. Right groin soft/dry post angiogram.      Will see again on Monday - please call if needed.     Ciarra Whitlocko, NP

## 2019-11-16 NOTE — PROGRESS NOTES
[facility]  Progress Note     Malik Hart Patient Status:  Inpatient    1945 MRN [id number]   [facility] 7NE-A Attending Golda Holstein, MD   Hosp Day # 15 PCP PHYSICIAN NONSTAFF     Chief Complaint: some leg pain    S: Patien --   --    *  --   --  120*  --   --    CO2 23.0  --   --  26.0  --   --        Estimated Creatinine Clearance: 101.6 mL/min (A) (based on SCr of 0.46 mg/dL (L)).     Recent Labs   Lab 11/14/19  0436   PTP 14.6   INR 1.09       No results for input(s) patient be referred to [facility] on discharge?: No  Estimated date of discharge: after angio, NED Montesinos d/w pt, RN, MD.  Voiding trial, watch PVRs. F/u on vascular recs.       Fadi GRANT  8:48 AM     As above, cath noted, agree with voiding trial.  Nazario Ramirez

## 2019-11-17 PROCEDURE — 99232 SBSQ HOSP IP/OBS MODERATE 35: CPT | Performed by: HOSPITALIST

## 2019-11-17 NOTE — PLAN OF CARE
Problem: Patient/Family Goals  Goal: Patient/Family Long Term Goal  Description  Patient's Long Term Goal: Go home with highest functional ability  Interventions:  - Ambulation  Pain control  - See additional Care Plan goals for specific interventions environment to reduce risk of injury  - Provide assistive devices as appropriate  - Consider OT/PT consult to assist with strengthening/mobility  - Encourage toileting schedule  Outcome: Progressing     Problem: DISCHARGE PLANNING  Goal: Discharge to home and precautions during self-care     Outcome: Progressing   Patients dressings changed this AM with Ortho PA. Legs with multiple open wounds cleansed with NS. Xerofoem, ABD, and dry guaze applied, then wrapped in kirlex.  Patient needed IV pain meds prior t

## 2019-11-17 NOTE — PROGRESS NOTES
[facility]  Progress Note    Dianne Perales Patient Status:  Inpatient    1945 MRN [id number]   [facility] 7NE-A Attending Howard Sifuentes MD   Hosp Day # 15 PCP PHYSICIAN NONSTAFF     SUBJECTIVE:  INTERVAL HISTORY: S/P 10 days sta

## 2019-11-17 NOTE — PLAN OF CARE
Assumed care. Pain managed with percocet and IV dilaudid. Pulses by doppler, right pedal is more fleeting. Mcgowan out yesterday with patient voiding. PVR with next void. Patient with IVabx infusing. Will monitor.        Problem: PAIN - ADULT  Goal: Verbalize in self care  Description  Interventions:  - Assess ability and encourage patient to participate in ADLs to maximize function  - Promote sitting position while performing ADLs such as feeding, grooming, and bathing  - Educate and encourage patient/family i

## 2019-11-17 NOTE — PROGRESS NOTES
[facility]  Progress Note     Rivera Lowery Patient Status:  Inpatient    1945 MRN [id number]   [facility] 7NE-A Attending Joanie Quezada MD   Hosp Day # 13 PCP PHYSICIAN NONSTAFF     Chief Complaint: some leg pain    S: Patien --   --   --    CO2 23.0  --   --  26.0  --   --   --        Estimated Creatinine Clearance: 97.4 mL/min (A) (based on SCr of 0.48 mg/dL (L)).     Recent Labs   Lab 11/14/19  0436   PTP 14.6   INR 1.09       No results for input(s): TROP, CK in the last 16 after angio, NED    Case d/w pt. Await vascular recs.     Werner Coats MD  [facility]

## 2019-11-18 PROCEDURE — 99232 SBSQ HOSP IP/OBS MODERATE 35: CPT | Performed by: HOSPITALIST

## 2019-11-18 PROCEDURE — 99233 SBSQ HOSP IP/OBS HIGH 50: CPT | Performed by: INTERNAL MEDICINE

## 2019-11-18 RX ORDER — OXYCODONE HYDROCHLORIDE AND ACETAMINOPHEN 5; 325 MG/1; MG/1
1 TABLET ORAL EVERY 4 HOURS PRN
Qty: 20 TABLET | Refills: 0 | Status: SHIPPED | OUTPATIENT
Start: 2019-11-18

## 2019-11-18 RX ORDER — POTASSIUM CHLORIDE 20 MEQ/1
40 TABLET, EXTENDED RELEASE ORAL EVERY 4 HOURS
Status: COMPLETED | OUTPATIENT
Start: 2019-11-18 | End: 2019-11-18

## 2019-11-18 RX ORDER — SENNA PLUS 8.6 MG/1
8.6 TABLET ORAL DAILY PRN
Qty: 30 TABLET | Refills: 0 | Status: SHIPPED | OUTPATIENT
Start: 2019-11-18

## 2019-11-18 RX ORDER — PSEUDOEPHEDRINE HCL 30 MG
100 TABLET ORAL 2 TIMES DAILY
Qty: 60 CAPSULE | Refills: 0 | Status: SHIPPED | OUTPATIENT
Start: 2019-11-18

## 2019-11-18 RX ORDER — ALFUZOSIN HYDROCHLORIDE 10 MG/1
10 TABLET, EXTENDED RELEASE ORAL
Qty: 14 TABLET | Refills: 0 | Status: SHIPPED | OUTPATIENT
Start: 2019-11-19

## 2019-11-18 RX ORDER — PANTOPRAZOLE SODIUM 40 MG/1
40 TABLET, DELAYED RELEASE ORAL
Qty: 30 TABLET | Refills: 0 | Status: SHIPPED | OUTPATIENT
Start: 2019-11-19

## 2019-11-18 NOTE — WOUND PROGRESS NOTE
[facility]  Inpatient Wound Care Contact Note    Patrice David Patient Status:  Inpatient    1945 MRN [id number]   [facility] 7NE-A Attending Norma Crockett MD   Hosp Day # 12 PCP PHYSICIAN NONSTAFF     Talked to RN and Social Wo

## 2019-11-18 NOTE — PROGRESS NOTES
[facility]  [facility] Cardiology Progress Note    Judson Sneed Patient Status:  Inpatient    1945 MRN [id number]   [facility] 7NE-A Attending Nae Saxena MD   Hosp Day # 16 PCP PHYSICIAN NONSTAFF        Assessment & Plan:  74-y 24 hour   Intake 680 ml   Output 700 ml   Net -20 ml       Physical Exam:  /56 (BP Location: Left arm)   Pulse 50   Temp 98.5 °F (36.9 °C) (Oral)   Resp 18   Ht 4' 9.99\" (1.473 m)   Wt 134 lb 4.8 oz (60.9 kg)   SpO2 98%   BMI 28.08 kg/m²       Gener

## 2019-11-18 NOTE — PROGRESS NOTES
[facility]  Progress Note     Cleve Gonzalez Patient Status:  Inpatient    1945 MRN [id number]   [facility] 7NE-A Attending Violette Hahn MD   Hosp Day # 12 PCP PHYSICIAN NONSTAFF     Chief Complaint: some leg pain    S: Patien not displayed. Estimated Creatinine Clearance: 103.8 mL/min (A) (based on SCr of 0.45 mg/dL (L)). Recent Labs   Lab 11/14/19  0436   PTP 14.6   INR 1.09       No results for input(s): TROP, CK in the last 168 hours.          Imaging: Imaging data r No    Estimated date of discharge: after revascularization, NED    Case d/w pt, RN [place]. Await vascular recs. Charlette GRANT  8:36 AM     As above, wounds examined, not grossly infected. Await vascular plan.     Nancy Flores MD  [facility]

## 2019-11-18 NOTE — WOUND PROGRESS NOTE
[facility]  Inpatient Wound Care Contact Note    Noralyn Reusing Patient Status:  Inpatient    1945 MRN [id number]   [facility] 7NE-A Attending Felice Ambriz MD   Hosp Day # 12 PCP PHYSICIAN NONSTAFF     Received call from Juan M SUPERFICIAL FEMORAL DISTAL:    113   RIGHT POPLITEAL PROX:      98   RIGHT POPLITEAL DISTAL:      80   TIBIOPER TRUNK:      Not visualized  RIGHT PTA PROX:      No flow  RIGHT PTA MID:      No flow  RIGHT PTA DISTAL:      No flow  RIGHT LAKHWINDER PROX:      Not

## 2019-11-18 NOTE — CM/SW NOTE
Called pt's son Evans about NED choice. Family would like pt to go to [facility] as their first choice. Referral made to [facility] via ecin - waiting for a response. Will need insurance auth.

## 2019-11-18 NOTE — PLAN OF CARE
Vascular recs reviewed and updated cards apn. They are reviewing films from angio to see if any further intervention from their standpoint. Updated ID and wound care also to assist with plan.        Sandoval GRANT  2:48 PM     Spoke w/ Dr. Tiana Persaud and

## 2019-11-18 NOTE — PLAN OF CARE
Assumed care at [address] [place] [address]. Pt A&O X 4. BLE dressings intact. BLE pulses per doppler. Extremities warm to touch. Sensation intact per pt. Voiding without difficulty. PVR minimal.   Percocet helpful for BLE pain. NSR/SB on tele.    No significant events o Identify cognitive and physical deficits and behaviors that affect risk of falls.   - [place] fall precautions as indicated by assessment.  - Educate pt/family on patient safety including physical limitations  - Instruct pt to call for assistance with act self care  Description  Interventions:  - Assess ability and encourage patient to participate in ADLs to maximize function  - Promote sitting position while performing ADLs such as feeding, grooming, and bathing  - Educate and encourage patient/family in t

## 2019-11-18 NOTE — PHYSICAL THERAPY NOTE
PHYSICAL THERAPY TREATMENT NOTE - INPATIENT    Room Number: 9103/8081-O     Session: 4  Number of Visits to Meet Established Goals: 4    Presenting Problem:  B LE ulcers    Problem List  Principal Problem:    Cellulitis of right lower extremity  Active Pro Little   -   Sitting down on and standing up from a chair with arms (e.g., wheelchair, bedside commode, etc.): A Lot   -   Moving from lying on back to sitting on the side of the bed?: A Lot   How much help from another person does the patient currently ne is limited by pain in BLE. Anticipate pt will progress with pain controlled as pt is motivated to participate in PT. Pt presents with impaired strength , balance and decreased activity tolerance below PLOF.    The AM-PAC '6-Clicks' [place]

## 2019-11-18 NOTE — PLAN OF CARE
Pt is alert, Tanzanian speaking, c/o severe pain, oxy and dilaudid given. SB on tele, HR into 30s, cards notified. briefly BLE dressings changed. Awaiting rec from Dr. Lucero Junior regarding revascularization. PVR check- 29mL. IV abx. Will continue to monitor.

## 2019-11-19 PROCEDURE — 99233 SBSQ HOSP IP/OBS HIGH 50: CPT | Performed by: INTERNAL MEDICINE

## 2019-11-19 PROCEDURE — 99232 SBSQ HOSP IP/OBS MODERATE 35: CPT | Performed by: HOSPITALIST

## 2019-11-19 RX ORDER — LISINOPRIL 10 MG/1
10 TABLET ORAL ONCE
Status: COMPLETED | OUTPATIENT
Start: 2019-11-19 | End: 2019-11-19

## 2019-11-19 RX ORDER — LISINOPRIL 20 MG/1
20 TABLET ORAL DAILY
Status: DISCONTINUED | OUTPATIENT
Start: 2019-11-20 | End: 2019-11-27

## 2019-11-19 NOTE — WOUND PROGRESS NOTE
[facility]  Report of Inpatient Wound Care Progress Note    Ekaterina Alexander Patient Status:  Inpatient    1945 MRN [id number]   [facility] 7NE-A Attending Yari Moeller MD   Hosp Day # 16 PCP PHYSICIAN NONSTAFF       SUBJECTIVE: r/o vasculitis    ASSESSMENT/ RECOMMENDATIONS:  Patient seen bedside with Dr. Grant Sanchez (Cardiology) as well as Dr. Zenobia Swann (Infectious Disease), RN and LORNA Perez with Cardiology.      Patient was pre-medicated prior with both oral and IV #0181. Time Spent 45 Minutes. Thank you,    Kat Benson.  Anne Fowler, PT, MPT  [address]  [address] [facility] [address]  [place], [address]  [phone]  Spectralink: [phone]  Pager:

## 2019-11-19 NOTE — PROGRESS NOTES
[facility]                INFECTIOUS DISEASE PROGRESS NOTE    Abdifatah Willis Patient Status:  Inpatient    1945 MRN [id number]   [facility] 7NE-A Attending Prashant Solorzano MD   Hosp Day # 16 PCP PHYSICIAN NONSTAFF     Antibiot Days N/A   2.  AEROBIC BACTERIAL CULTURE     Status: Abnormal    Collection Time: 11/02/19  8:06 PM   Result Value Ref Range    Aerobic Culture Result  N/A     Mixture of organisms suggestive of normal skin lionel    Aerobic Culture Result 3+ growth Streptoc

## 2019-11-19 NOTE — CONSULTS
[facility]    Report of Consultation    Adriana Lundberg Patient Status:  Inpatient    1945 MRN [id number]   [facility] 7NE-A Attending Charline Torres MD   [address] Day # 12 PCP PHYSICIAN NONSTAFF     Date of Admission:  2019  Date mouth daily. Clopidogrel Bisulfate 75 MG Oral Tab, Take 75 mg by mouth daily. ferrous sulfate 325 (65 FE) MG Oral Tab EC, Take 325 mg by mouth daily with breakfast.  hydrochlorothiazide 25 MG Oral Tab, Take 25 mg by mouth daily.   lisinopril 10 MG Oral Ta 11/18/2019    CA 7.7 (L) 11/18/2019    ALB 1.6 (L) 11/05/2019    ALKPHO 104 11/05/2019    BILT 0.3 11/05/2019    TP 6.8 11/05/2019    AST 12 (L) 11/05/2019    ALT 11 (L) 11/05/2019    PTT 54.2 (H) 11/04/2019    INR 1.09 11/14/2019    MG 2.2 11/14/2019    T

## 2019-11-19 NOTE — PAYOR COMM NOTE
--------------  CONTINUED STAY REVIEW    Theresa Banco COMPLETE MMAI  Subscriber #:  [id number]  Authorization Number: [id number]    Admit date: 11/2/19  Admit time: 2321    Admitting Physician: Almaz Posada MD  Attending Physician:  Almaz Posada MD  P orders, called RN and relayed the same. Lalita Bueno, PT, MPT  [address]  11/18/2019  1:03 PM     Cardiology Progress Note  Assessment & Plan:  41-year-old female with PMHx significant for PAD, HTN, HL admitted w 26.0     ASSESSMENT/PLAN:  1. SP Debridement of necrotic leg wounds  With group C strep bacteremia  -drainage cultures growing group C strep, Pseudomonas, MRSA  Complete abx 11/19     2.  PAD/angio planned per cards-PV  Sp angio -vascular following  Charley Foster Piperacillin Sod-Tazobactam So (ZOSYN) 3.375 g in dextrose 5 % 100 mL ADD-vantage     Date Action Dose Route User    11/18/2019 1139 New Bag 3.375 g Intravenous Jatin Mathur RN    11/18/2019 9636 New Bag 3.375 g Intravenous Raissa Martinez RN    11/1

## 2019-11-19 NOTE — PROGRESS NOTES
Events noted over past 24 hours. Vascular, Cardiology, Dermatology and Wound Care notes all reviewed in detail. Patients wounds were unwrapped and examined yesterday morning.     I discussed the case with Dr. Debra Navas, who doesn't feel like anything can be d

## 2019-11-19 NOTE — PLAN OF CARE
Assumed care at [address]. Pt a/ox4, Tajik speaking. VSS. NSR/SB per tele. RA. Denies pain. Continent of B/B, pvr 11 at 2030. No retention noted. On IV abx. Contact isolation maintained. BLE dressing c/d/I, reinforced prn. Dr. Peggy Gutiérrez came to see pt.   Pl needs  - Identify cognitive and physical deficits and behaviors that affect risk of falls.   - [place] fall precautions as indicated by assessment.  - Educate pt/family on patient safety including physical limitations  - Instruct pt to call for assistance self care  Description  Interventions:  - Assess ability and encourage patient to participate in ADLs to maximize function  - Promote sitting position while performing ADLs such as feeding, grooming, and bathing  - Educate and encourage patient/family in t

## 2019-11-19 NOTE — WOUND PROGRESS NOTE
[facility]  Inpatient Wound Care Contact Note    Prudence Clas Patient Status:  Inpatient    1945 MRN [id number]   [facility] 7NE-A Attending Randall Aschoff, MD   Hosp Day # 16 PCP PHYSICIAN NONSTAFF     Talked to RN, coordinated

## 2019-11-19 NOTE — CM/SW NOTE
9: 52am  MSW sent message in [place] and called Liaison Aliyah Allen and left message to discuss if SNF has Insurance auth and potential dc today.

## 2019-11-19 NOTE — PROGRESS NOTES
[facility]  Progress Note     Sameera Passer Patient Status:  Inpatient    1945 MRN [id number]   [facility] 7NE-A Attending Alejandra Lr MD   Hosp Day # 16 PCP PHYSICIAN NONSTAFF     Chief Complaint: some leg pain    S: Patien Estimated Creatinine Clearance: 99.4 mL/min (A) (based on SCr of 0.47 mg/dL (L)). Recent Labs   Lab 11/14/19  0436   PTP 14.6   INR 1.09       No results for input(s): TROP, CK in the last 168 hours. Imaging: Imaging data reviewed in Epic. ortho recs reviewed. Appreciate consultants.      Norris GRANT  8:43 AM

## 2019-11-19 NOTE — PROGRESS NOTES
[facility]  Progress Note    Myrna Lowry Patient Status:  Inpatient    1945 MRN [id number]   [facility] 7NE-A Attending Almaz Posada MD   Hosp Day # 16 PCP PHYSICIAN NONSTAFF       Subjective:  No chest pain or shortness of b Strep c bacteremia- secondary to above. Abx per id. Wound care following. Derm eval noted. 3. Cp, elevated trop on admission- nuc 11/6 with fixed anterior wall defect. Echo with preserved lvef, no wma  4.  Htn, grade 1 diastolic dysfunction- fair bp [employer] Debby Vivas MD  11/19/2019  [facility]  Interventional Cardiology

## 2019-11-19 NOTE — CM/SW NOTE
3:32pm  MSW spoke to Rn, 2nd opinion has not happen yet. MSW updated [facility] rishi Ye An that DC is pending still. no peripheral edema/no dyspnea on exertion/no chest pain/no palpitations

## 2019-11-20 ENCOUNTER — APPOINTMENT (OUTPATIENT)
Dept: ULTRASOUND IMAGING | Facility: HOSPITAL | Age: 74
DRG: 854 | End: 2019-11-20
Attending: SURGERY
Payer: MEDICARE

## 2019-11-20 PROCEDURE — 99232 SBSQ HOSP IP/OBS MODERATE 35: CPT | Performed by: HOSPITALIST

## 2019-11-20 PROCEDURE — 99233 SBSQ HOSP IP/OBS HIGH 50: CPT | Performed by: INTERNAL MEDICINE

## 2019-11-20 PROCEDURE — 93970 EXTREMITY STUDY: CPT | Performed by: SURGERY

## 2019-11-20 PROCEDURE — 93922 UPR/L XTREMITY ART 2 LEVELS: CPT | Performed by: SURGERY

## 2019-11-20 RX ORDER — ASPIRIN 81 MG/1
81 TABLET, CHEWABLE ORAL DAILY
Status: DISCONTINUED | OUTPATIENT
Start: 2019-11-20 | End: 2019-11-27

## 2019-11-20 NOTE — PAYOR COMM NOTE
--------------  CONTINUED STAY REVIEW    Mehdi Espinoza COMPLETE [facility]  Subscriber #:  [id number]  Authorization Number: [id number]    Admit date: 11/2/19  Admit time: 2321    Admitting Physician: Romeo Pierson MD  Attending Physician:  DO MARY KATE Graham Blanca Martínez RN    11/19/2019 2107 Given 100 mg Oral Kosic, Jhon Saleem RN      HYDROmorphone HCl (DILAUDID) 1 MG/ML injection 0.4 mg     Date Action Dose Route User    11/20/2019 1106 Given 0.4 mg Intravenous Diane Del Rio RN    11/19/2019 2108 Give

## 2019-11-20 NOTE — PLAN OF CARE
PT A/O, SHANTAL LE DRESSING D/I, PP WITH DOPPLER, WARM, , 94% ON RA, SR/SB, IV ZOSYN, VOIDING, C/O OF LEG PAIN, GIVEN PERCOCET/DILAUDID, ISOLATION, LABS IN AM.     Problem: PAIN - ADULT  Goal: Verbalizes/displays adequate comfort level or patient's stated pain

## 2019-11-20 NOTE — WOUND PROGRESS NOTE
[facility]  Inpatient Wound Care Contact Note    Cira Rebolledo Patient Status:  Inpatient    1945 MRN [id number]   [facility] 7NE-A Attending Conor Schafer, [address] [facility] Day # 25 PCP PHYSICIAN NONSTAFF     Received call from SULMA virk

## 2019-11-20 NOTE — CM/SW NOTE
Interdisciplinary Rounds: 11/20/19  Admitted: 11/2/2019 LOS: 18  Disciplines in attendance: charge nurse, staff nurse, CM, [address] and discharge plan reviewed.     Active issues needing resolution prior to discharge: 2nd opinion from cardiology and

## 2019-11-20 NOTE — PROGRESS NOTES
[facility]                INFECTIOUS DISEASE PROGRESS NOTE    Bri Yuan Patient Status:  Inpatient    1945 MRN [id number]   [facility] 7NE-A Attending Betty Sherman MD   Hosp Day # 25 PCP PHYSICIAN NONSTAFF     Antibiot BACTERIAL CULTURE     Status: Abnormal    Collection Time: 11/02/19  8:06 PM   Result Value Ref Range    Aerobic Culture Result  N/A     Mixture of organisms suggestive of normal skin lionel    Aerobic Culture Result 3+ growth Streptococcus Group C (A) N/A

## 2019-11-20 NOTE — PROGRESS NOTES
[facility]  Progress Note     Jatin Vieira Patient Status:  Inpatient    1945 MRN [id number]   [facility] 7NE-A Attending Sami Durant MD   Hosp Day # 25 PCP PHYSICIAN NONSTAFF     Chief Complaint: some leg pain    S: Patien Estimated Creatinine Clearance: 82 mL/min (based on SCr of 0.57 mg/dL). Recent Labs   Lab 11/14/19  0436   PTP 14.6   INR 1.09       No results for input(s): TROP, CK in the last 168 hours. Imaging: Imaging data reviewed in Epic.     [facility]    Case d/w pt, RN.  F/u on rheum w/u and vascular second opinion. Marino GRANT  8:43 AM     Hospitalist Addendum    S: Patient seen and examined and agree with above.     O: /42 (BP Location: Left arm)   Pulse 72   Temp 98.1 °F (36.7 °

## 2019-11-20 NOTE — PROGRESS NOTES
[facility]  Cardiology Progress Note    [facility] Patient Status:  Inpatient    1945 MRN [id number]   [facility] 7NE-A Attending Erin Greene, [address] [facility] Day # 18 PCP PHYSICIAN NONSTAFF     Subjective:  Comfortable lying in b Dermatology consult states consistent with ulcers secondary to PAD and infection. Dressings removed and observed by Cards,ID and wound care see from Dr. Juanita Price 11/19 for images. • CP, resolved. Elevated troponin (0.392) on admission.  ECHO with preserve

## 2019-11-20 NOTE — PLAN OF CARE
Assumed care at 0730  A&Ox4, VSS, SB on tele  iPad  used for translating  Dr. Yamile Oscar consulted for second opinion  Pain controlled with percocet. Severe pain with dressing changes  Pulses per doppler  Pt and family updated on POC.  Needs attend

## 2019-11-20 NOTE — DIETARY NOTE
[facility]     Admitting diagnosis:  PVD (peripheral vascular disease) ([facility] [I73.9]  Cellulitis of right lower extremity [K61.598]  Open wound of right lower extremity, initial encounter [S8.801A]  Open wound o

## 2019-11-21 PROCEDURE — 99232 SBSQ HOSP IP/OBS MODERATE 35: CPT | Performed by: HOSPITALIST

## 2019-11-21 PROCEDURE — 0HBLXZX EXCISION OF LEFT LOWER LEG SKIN, EXTERNAL APPROACH, DIAGNOSTIC: ICD-10-PCS | Performed by: SPECIALIST

## 2019-11-21 PROCEDURE — 99232 SBSQ HOSP IP/OBS MODERATE 35: CPT | Performed by: INTERNAL MEDICINE

## 2019-11-21 NOTE — OCCUPATIONAL THERAPY NOTE
OCCUPATIONAL THERAPY TREATMENT NOTE - INPATIENT     Room Number: 1516/1162-W  Session: 3   Number of Visits to Meet Established Goals: 7    Presenting Problem: RLE cellulitis, BLE wounds; PAD, AZUCENA    History related to current admission: Patient is a 73y/o disease) ([facility]    [facility]    Hyponatremia    Anemia    Urinary retention      Past Medical History  Past Medical History:   Diagnosis Date   • Essential hypertension    • Hyperlipidemia        Past Surgical History  Past Surgical Hi as well. Sock aid not recommended-wound. Mod A to stand. OT and PT provided weight shifting when pt started to take a step, but pt unable. After a few attempts, took a step. Mod A pivot transfer.   Pt prefers to have a pillow under her legs before the c

## 2019-11-21 NOTE — PLAN OF CARE
Pt alert and oriented x4, SB, on Room air. Bots[place]an speaking. BLE dressings changed. Wounds moist/painful. Awaiting results of rheum work up, Dr. Indira Machado to determine plan for revascularization. Dr. Pili Arrieta biopsied leg wounds.  Pt son at bedside, updated on P

## 2019-11-21 NOTE — H&P
Vascular Surgery  New Patient Consultation    Name: Adriana Lundberg  MRN: [id number]  : 1945    Referring Provider: No ref.  provider found    CC: Nonhealing wounds of the bilateral lower extremities    HPI: 25-year-old female with history of hypert Clopidogrel Bisulfate 75 MG Oral Tab, Take 75 mg by mouth daily. , Disp: , Rfl:   ferrous sulfate 325 (65 FE) MG Oral Tab EC, Take 325 mg by mouth daily with breakfast., Disp: , Rfl:   hydrochlorothiazide 25 MG Oral Tab, Take 25 mg by mouth daily. , Disp: , Evidence of severe wounds of the bilateral anterior lower legs extending to the posterior lower legs with no signs of cellulitis. Serous drainage bilaterally.   Labs:  Lab Results   Component Value Date    WBC 4.6 11/18/2019    HGB 8.3 (L) 11/18/2019    HC :::::GREATER SAPHENOUS VEINS:::::  REFLUX:  SFJ:               Mild reflux on the right. No reflux on the left. .  Proximal Thigh:    Both right and left sides are competent. Mid Thigh:            Both right and left sides are competent.   Distal Thigh: 53-year-old female who presents with chronic nonhealing wounds of the bilateral lower legs extending both anteriorly and posteriorly. She underwent recent angiogram with no evidence of arterial insufficiency as the etiology of these persisting wounds.   Monae Soto

## 2019-11-21 NOTE — PROGRESS NOTES
[facility]  Progress Note    Cira Rebolledo Patient Status:  Inpatient    1945 MRN [id number]   [facility] 7NE-A Attending Lynn Gerard MD   Hosp Day # 23 PCP PHYSICIAN NONSTAFF       Subjective:  Pt seen and examined with use o Oral 2x Daily(Beta Blocker)         Assessment:      1. PVD- peripheral angiogram 11/15, two-vessel runoff on the right and single-vessel runoff on the left. Venous studies reveal competent valves.  ABIs normal however noted to have monophasic tibial and do

## 2019-11-21 NOTE — PHYSICAL THERAPY NOTE
PHYSICAL THERAPY TREATMENT NOTE - INPATIENT    Room Number: 0280/5508-I     Session: 5   Number of Visits to Meet Established Goals: 4    Presenting Problem:  B LE ulcers  History related to current admission: admitted by family with worsening LE wounds + AM-PAC '6-Clicks' INPATIENT SHORT FORM - BASIC MOBILITY  How much difficulty does the patient currently have. ..  -   Turning over in bed (including adjusting bedclothes, sheets and blankets)?: None   -   Sitting down on and standing up from a chair EXERCISES  Lower Extremity Alternating marching  Ankle pumps  Hip AB/AD  LAQ  all with cueing for technique     Upper Extremity see OT note     Position Sitting     Repetitions   10   Sets   1     Patient End of Session: Up in chair;Needs met;Call light wi

## 2019-11-21 NOTE — PROGRESS NOTES
[facility]  Progress Note     Bri Yuan Patient Status:  Inpatient    1945 MRN [id number]   [facility] 7NE-A Attending Dr. Sami Tavares Day # 23 PCP PHYSICIAN NONSTAFF     Chief Complaint: some leg pain    S: Patient rep piperacillin-tazobactam  3.375 g Intravenous Q8H   • collagenase   Topical Daily   • Pantoprazole Sodium  40 mg Oral QAM AC   • Alfuzosin HCl ER  10 mg Oral Daily with breakfast   • sodium chloride 0.9%  500 mL Intravenous Once   • docusate sodium  100 mg ulcers secondary to strep C/PAD  1. Antibiotics per ID  2. ASA/plavix for PAD  1. Revascularization being considered  3. Not c/w pyoderma gangrenosum per derm  4.  ANCA panel pending     Alan Read DO

## 2019-11-21 NOTE — PROGRESS NOTES
[facility]                INFECTIOUS DISEASE PROGRESS NOTE    Reji oGmez Patient Status:  Inpatient    1945 MRN [id number]   [facility] 7NE-A Attending Kevin Gifford MD   Hosp Day # 23 PCP PHYSICIAN NONSTAFF     Antibiot Result  N/A     Mixture of organisms suggestive of normal skin lionel    Aerobic Culture Result 3+ growth Streptococcus Group C (A) N/A    Aerobic Culture Result 2+ growth Staphylococcus aureus, MRSA (A) N/A    Aerobic Smear 2+ WBCs seen N/A    Aerobic Smea

## 2019-11-21 NOTE — PLAN OF CARE
Assumed care at [address] [facility]. Pt a/ox4, Vietnamese speaking. Denies pain. VSS. NSR/SB per tele. RA. On IV abx. Voids. BLE dressing c/d/I. Contact isolation maintained. Pt and daughter updated w/ poc. Will continue to monitor.   Problem: Patient/Family Goals  [place] fall precautions as indicated by assessment.  - Educate pt/family on patient safety including physical limitations  - Instruct pt to call for assistance with activity based on assessment  - Modify environment to reduce risk of injury  - Provide a in ADLs to maximize function  - Promote sitting position while performing ADLs such as feeding, grooming, and bathing  - Educate and encourage patient/family in tolerated functional activity level and precautions during self-care     Outcome: Progressing

## 2019-11-21 NOTE — PLAN OF CARE
Assumed care at 0730  A&Ox4, VSS, NSR/SB on tele  BLE dressings changed per order  Severe pain during dressing changes despite medicating prior  Turned and repositioned frequently  Pt and family updated on POC.  Needs attended to, will continue to monitor

## 2019-11-22 PROCEDURE — 99232 SBSQ HOSP IP/OBS MODERATE 35: CPT | Performed by: INTERNAL MEDICINE

## 2019-11-22 PROCEDURE — 99232 SBSQ HOSP IP/OBS MODERATE 35: CPT | Performed by: HOSPITALIST

## 2019-11-22 PROCEDURE — 99223 1ST HOSP IP/OBS HIGH 75: CPT | Performed by: INTERNAL MEDICINE

## 2019-11-22 NOTE — CONSULTS
[facility]  Rheumatology Report of Consultation  Joel Pascal Patient Status:  Inpatient    1945 MRN [id number]   [facility] 7NE-A Attending Shazia Brantley, [address] [facility] Day # 20 PCP PHYSICIAN NONSTAFF     Date of Admission: 20 or morning stiffness. Denies hx of frequent sinus infections or epistaxis. Denies chronic cough or hemoptysis. States she can get short of breath occasionally but not on a daily basis and relates to other heart disease. Denies cocaine or illicit drug use. (XYLOCAINE) 1 % injection, , ,   [COMPLETED] Heparin Sodium (Porcine) 5000 UNIT/ML injection, , ,   [COMPLETED] Midazolam HCl (VERSED) 2 MG/2ML injection, , ,   [COMPLETED] fentaNYL citrate (SUBLIMAZE) 0.05 MG/ML injection, , ,   [COMPLETED] iodixanol (VIS (DULCOLAX) rectal suppository 10 mg, 10 mg, Rectal, Daily PRN  FLEET ENEMA (FLEET) 7-19 GM/118ML enema 133 mL, 1 enema, Rectal, Once PRN  [COMPLETED] Midazolam HCl (VERSED) 2 MG/2ML injection 1 mg, 1 mg, Intravenous, Q5 Min PRN  [COMPLETED] ondansetron HCl 5000 UNIT/ML injection, , ,   [COMPLETED] HYDROmorphone HCl (DILAUDID) 1 MG/ML injection 0.5 mg, 0.5 mg, Intravenous, Q30 Min PRN  [COMPLETED] ondansetron HCl (ZOFRAN) injection 4 mg, 4 mg, Intravenous, Once  [COMPLETED] ceFAZolin sodium (ANCEF/KEFZOL) 2 G COMPARISON:  US ADA, US ARTERIAL DUPLEX LOWER EXTREMITY BILATERAL (CPT=93925), 11/06/2019, 15:14. INDICATIONS:  BLE wounds     TECHNIQUE:  Doppler waveform of the arterial tree was performed.   Pressure measurements were obtained of the lower ext angiogram demonstrates mild calcific atherosclerosis throughout the right SFA and above knee popliteal artery. Mild multifocal stenosis of the right popliteal artery proximally. The right posterior tibial artery is   completely occluded.   Likely faint re anterior tibial is occluded essentially at the ankle and   there is minimal to no flow to the left foot    -R CFA: Patent vessel  -R SFA: Patent vessel with mild plaquing at the beginning the   popliteal  -The remaining popliteal is without disease and giv blood/protein  -- continue management per other services  -- await biopsy results for further information if lesions due to vasculitis or PAD     Discussed at length with pt, pt's family members as well as RN.    Verbalized understanding of above instructio

## 2019-11-22 NOTE — OCCUPATIONAL THERAPY NOTE
OCCUPATIONAL THERAPY TREATMENT NOTE - INPATIENT     Room Number: 8606/7836-A  Session: 4   Number of Visits to Meet Established Goals: 7    Presenting Problem: RLE cellulitis, BLE wounds; PAD, AZUCENA    History related to current admission: Patient is a 73y/o ([facility] [address]    AZUCENA (acute kidney injury) ([facility] [address]    Hyponatremia    Anemia    Urinary retention      Past Medical History  Past Medical History:   Diagnosis Date   • Essential hypertension    • Hyperlipidemia        Past Surgical History  Past Surgical History: then rolled with mod assist with finished to get off bed pan, pt was cleaned with max assist, pt t/f to EOB with mod assist, pt sat at EOB with CGA, pt attempted to daljit socks but unable to lean down 2/2 sitting balance and unable to pull leg up 2/2 pain, assist     UE Exercise Program Goal  Patient will be independent with bilateral AROM HEP (home exercise program

## 2019-11-22 NOTE — PAYOR COMM NOTE
--------------  CONTINUED STAY REVIEW    Reanna Watkins COMPLETE [facility]  Subscriber #:  [id number]  Authorization Number: [id number]    Admit date: 11/2/19  Admit time: 2321    Admitting Physician: Joselin Carvajal MD  Attending Physician:  DO MARY KATE Josue DAY:  Alfuzosin HCl ER (UROXATRAL) 24 hr tab 10 mg     Date Action Dose Route User    11/22/2019 0919 Given 10 mg Oral Carlin Varghese RN      aspirin chewable tab 81 mg     Date Action Dose Route User    11/22/2019 0919 Given 81 mg Oral Carlin Varghese RN 2152 New Bag 3.375 g Intravenous Brandie Kelly RN          FOR CONTINUED REVIEW/APPROVAL OF INPT INTERMEDIATE CARE ADMISSION

## 2019-11-22 NOTE — PROGRESS NOTES
[facility]    Progress Note    Cleve Gonzalez Patient Status:  Inpatient    1945 MRN [id number]   [facility] 7NE-A Attending Soledad Hansen, [address] [facility] Day # 23 PCP PHYSICIAN NONSTAFF        Subjective:     Constitutional: Feliz Mojica pyoderma gangrenosum, but which are not present currently. I have performed skin biopsies of the left lower leg for both histamine and eosin pathology and for direct immunofluorescent pathology.   The difficulty in this patient's case is that the lesions w Insufficiency: CFV, SFV, POP, PVT'S, PERONEALS:  Reflux of >0.5 seconds or more at 7 cm/sec is abnormal.  NORMAL (Competent) is less than 0.5 seconds of reflux MILD is between 0.5 to 1.0 seconds of reflux MODERATE is 1.0 to 3.0 seconds of reflux SEVERE ref

## 2019-11-22 NOTE — PLAN OF CARE
Assumed pt care @ 1930, son visiting at bedside. Pt resting in bed comfortably. BLE with dressing, clean/dry and intact. Bilateral foot elevated on pillow, pt refusing offloading heel boots. Pedal pulses per doppler.  Unable to check post-tip d/t dressi injury  Description  INTERVENTIONS:  - Assess pt frequently for physical needs  - Identify cognitive and physical deficits and behaviors that affect risk of falls.   - [place] fall precautions as indicated by assessment.  - Educate pt/family on patient sa Activities of Daily Living  Goal: Achieve highest/safest level of independence in self care  Description  Interventions:  - Assess ability and encourage patient to participate in ADLs to maximize function  - Promote sitting position while performing ADLs s

## 2019-11-22 NOTE — PROGRESS NOTES
[facility]  [facility] Cardiology Progress Note    Caprice Layton Patient Status:  Inpatient    1945 MRN [id number]   [facility] 7NE-A Attending Jessy Remy, [address] [facility] Day # 20 PCP PHYSICIAN NONSTAFF        Assessment & Plan:  74-y Physical Exam:  BP (!) 134/93 (BP Location: Left arm)   Pulse 53   Temp 98.4 °F (36.9 °C) (Oral)   Resp 16   Ht 4' 9.99\" (1.473 m)   Wt 134 lb 4.8 oz (60.9 kg)   SpO2 93%   BMI 28.08 kg/m²       General: Alert and Oriented  HEENT: Normocephalic, ani

## 2019-11-22 NOTE — PROGRESS NOTES
[facility]                INFECTIOUS DISEASE PROGRESS NOTE    Nicole Jhaveri Patient Status:  Inpatient    1945 MRN [id number]   [facility] 7NE-A Attending Betito Mcmanus MD   Hosp Day # 21 PCP PHYSICIAN NONSTAFF     Antibiot Status: Abnormal    Collection Time: 11/02/19  8:06 PM   Result Value Ref Range    Aerobic Culture Result  N/A     Mixture of organisms suggestive of normal skin lionel    Aerobic Culture Result 3+ growth Streptococcus Group C (A) N/A    Aerobic Culture Res

## 2019-11-22 NOTE — WOUND PROGRESS NOTE
[facility]  Inpatient Wound Care Contact Note    Josesito Pratt Patient Status:  Inpatient    1945 MRN [id number]   [facility] 7NE-A Attending Geno Hummel, [address] [facility] Day # 9[address] [facility] PCP Michela Oscar note

## 2019-11-22 NOTE — PROGRESS NOTES
[facility]  Progress Note     Sameera Passer Patient Status:  Inpatient    1945 MRN [id number]   [facility] 7NE-A Attending Dr. Kemp Dheeraj Day # 21 PCP PHYSICIAN NONSTAFF     Chief Complaint: little leg pain    S: Patient h input(s): TROP, CK in the last 168 hours. Imaging: Imaging data reviewed in Epic.     Medications:   • aspirin  81 mg Oral Daily   • lisinopril  20 mg Oral Daily   • piperacillin-tazobactam  3.375 g Intravenous Q8H   • collagenase   Topical Daily but will reconsider if no improvement. Await skin biopsy results.      Wesley GRANT  9:04 AM     Hospitalist Addendum     S: Patient seen and examined and agree with above.     O: /59 (BP Location: Left arm)   Pulse 53   Temp 98.9 °F (37.2 °C)

## 2019-11-23 PROCEDURE — 99232 SBSQ HOSP IP/OBS MODERATE 35: CPT | Performed by: HOSPITALIST

## 2019-11-23 RX ORDER — ENOXAPARIN SODIUM 100 MG/ML
40 INJECTION SUBCUTANEOUS DAILY
Status: DISCONTINUED | OUTPATIENT
Start: 2019-11-23 | End: 2019-11-27

## 2019-11-23 NOTE — PLAN OF CARE
Assumed care @0730  VSS, A&Ox4, RA  NSR , pulses per doppler. Pain with dressing changes. Dilaudid and Percocet given with relief. Stand pivot with 2 people to chair. Tolerating Regular diet. Intake and outputs w/d/l. IV Zosyn given.   Dressing change injury  Description  INTERVENTIONS:  - Assess pt frequently for physical needs  - Identify cognitive and physical deficits and behaviors that affect risk of falls.   - [place] fall precautions as indicated by assessment.  - Educate pt/family on patient sa Activities of Daily Living  Goal: Achieve highest/safest level of independence in self care  Description  Interventions:  - Assess ability and encourage patient to participate in ADLs to maximize function  - Promote sitting position while performing ADLs s

## 2019-11-23 NOTE — PROGRESS NOTES
[facility]    Progress Note    Abdifatah Willis Patient Status:  Inpatient    1945 MRN [id number]   [facility] 7NE-A Attending Alfredo Kelley, DO   Hosp Day # 21 PCP PHYSICIAN NONSTAFF        Subjective:     HENT: Negative for mike ANCA/ Immunoglobulins A/G/M quantitative have all proven negative. ESR and CRP have been elevated, but that would be consistent with her infection.     Plan: Await results of SPEP, cryoglobulins, Immunoglobulins Free Light Chains, and hematoxylin & eosin a

## 2019-11-23 NOTE — PLAN OF CARE
Assumed care @0552  VSS, A&Ox4, RA  NSR , Pain with dressing changes. Dilaudid and Percocet given with relief. Stand pivot with 2 people to chair. Tolerating Regular diet. Intake and outputs w/d/l. Dressing changed on LE.   Awaiting skin biopsy resul Progressing     Problem: SAFETY ADULT - FALL  Goal: Free from fall injury  Description  INTERVENTIONS:  - Assess pt frequently for physical needs  - Identify cognitive and physical deficits and behaviors that affect risk of falls.   - [place] fall precaut prior to mobility skills   Outcome: Progressing     Problem: Impaired Activities of Daily Living  Goal: Achieve highest/safest level of independence in self care  Description  Interventions:  - Assess ability and encourage patient to participate in ADLs to

## 2019-11-23 NOTE — PLAN OF CARE
Assumed care. Patient resting comfortably. Pain medication given x1 dose. Patient resting overnight. No new complaints. Some bloody drainage noted on right leg dressing. IV abx continued. Isolation for MRSA in leg wounds.        Problem: PAIN - ADULT  Goal: resources  Description  INTERVENTIONS:  - Identify barriers to discharge w/pt and caregiver  - Include patient/family/discharge partner in discharge planning  - Arrange for needed discharge resources and transportation as appropriate  - Identify discharge

## 2019-11-23 NOTE — PROGRESS NOTES
[facility]  Progress Note     Malik Hart Patient Status:  Inpatient    1945 MRN [id number]   [facility] 7NE-A Attending Dr. Kandy Taylor Day # 21 PCP PHYSICIAN NONSTAFF     Chief Complaint: leg pain    S: Patient seen and Daily   • lisinopril  20 mg Oral Daily   • piperacillin-tazobactam  3.375 g Intravenous Q8H   • collagenase   Topical Daily   • Pantoprazole Sodium  40 mg Oral QAM AC   • Alfuzosin HCl ER  10 mg Oral Daily with breakfast   • sodium chloride 0.9%  500 mL In

## 2019-11-24 PROCEDURE — 99232 SBSQ HOSP IP/OBS MODERATE 35: CPT | Performed by: HOSPITALIST

## 2019-11-24 NOTE — PLAN OF CARE
Assumed care at [address] [place] [address] Road. Pt A&O X 4.  Percocet helpful for BLE pain. Pedal pulses present via doppler. Dressings intact to BLE. Appears to have slept well overnight. Able to turn and reposition self. NSR/SB on tele. No significant events overnight. cognitive and physical deficits and behaviors that affect risk of falls.   - [place] fall precautions as indicated by assessment.  - Educate pt/family on patient safety including physical limitations  - Instruct pt to call for assistance with activity bas care  Description  Interventions:  - Assess ability and encourage patient to participate in ADLs to maximize function  - Promote sitting position while performing ADLs such as feeding, grooming, and bathing  - Educate and encourage patient/family in [place]

## 2019-11-24 NOTE — PROGRESS NOTES
ADA HOSPITALIST  Progress Note     Rivera Lowery Patient Status:  Inpatient    1945 MRN [id number]   [facility] 7NE-A Attending Dr. Danuta Celaya Day # 25 PCP PHYSICIAN NONSTAFF     Chief Complaint: leg pain    S: Patient reports enoxaparin  40 mg Subcutaneous Daily   • aspirin  81 mg Oral Daily   • lisinopril  20 mg Oral Daily   • piperacillin-tazobactam  3.375 g Intravenous Q8H   • collagenase   Topical Daily   • Pantoprazole Sodium  40 mg Oral QAM AC   • Alfuzosin HCl ER  10 mg Wt 134 lb 4.8 oz (60.9 kg)   SpO2 96%   BMI 28.08 kg/m²   Gen: NAD  Card: RRR   Pulm: CTAB  Ext: B/L LE dressed/wrapped      A/P  13. B/L LE necrotic ulcers secondary to strep C/PAD  1. Antibiotics per ID  2. ASA/plavix for PAD  1.  Revascularization not p

## 2019-11-24 NOTE — PLAN OF CARE
HR in 40s in AM so metoprolol held. Lisinopril given later, HR 60. Gave IV dilaudid prior to dressing changes.  Dressings changed per orders  Percocet given x1 for intermittent pain in legs  Offered to get pt up in chair, pt refused  Family at bedside

## 2019-11-25 PROCEDURE — 99232 SBSQ HOSP IP/OBS MODERATE 35: CPT | Performed by: HOSPITALIST

## 2019-11-25 RX ORDER — DOXYCYCLINE HYCLATE 100 MG/1
100 CAPSULE ORAL EVERY 12 HOURS SCHEDULED
Status: DISCONTINUED | OUTPATIENT
Start: 2019-11-25 | End: 2019-11-27

## 2019-11-25 RX ORDER — DOXYCYCLINE HYCLATE 100 MG/1
100 CAPSULE ORAL EVERY 12 HOURS SCHEDULED
Qty: 60 CAPSULE | Refills: 0 | Status: SHIPPED | OUTPATIENT
Start: 2019-11-25 | End: 2019-12-25

## 2019-11-25 NOTE — PROGRESS NOTES
[facility]                INFECTIOUS DISEASE PROGRESS NOTE    Pablito Chen Patient Status:  Inpatient    1945 MRN [id number]   [facility] 7NE-A Attending Joselin Carvajal MD   Hosp Day # 21 PCP PHYSICIAN NONSTAFF     Antibiot

## 2019-11-25 NOTE — CM/SW NOTE
Pumaken was informed via ECIN that the Patient will need weekly follow up as OP at [facility] upon discharge to SNF. They have submitted for insurance auth.        Rachana Man RN, [facility]    [phone]

## 2019-11-25 NOTE — DIETARY NOTE
[facility]    NUTRITION ASSESSMENT    Pt does not meet malnutrition criteria. NUTRITION DIAGNOSIS/PROBLEM:    Increased nutrient needs related to increased demands of healing as evidenced by wounds to leg    NUTRITION INTERVENTION:       1.  Meal risk    FOLLOW-UP DATE: 12/2    Cece Osborne RD,LDN  Pager #[phone]

## 2019-11-25 NOTE — PLAN OF CARE
Assumed care at 0730  Pt AxOx4, SB/SR on tele, RA  Up to chair with therapy  C/o of leg pain.  Relief with dilaudid and percocet  Abx changed to oral per ID  Dressing changed  LE pulses present per doppler  Spoke to lab- surgical pathology report expected t

## 2019-11-25 NOTE — CM/SW NOTE
Interdisciplinary Rounds: 11/25/19  Admitted: 11/2/2019 LOS: 23  Disciplines in attendance: charge nurse, staff nurse, CM, [address] and discharge plan reviewed.     Active issues needing resolution prior to discharge: final test results are pending

## 2019-11-25 NOTE — PLAN OF CARE
Assumed care of pt at [address]. Family at bedside. Pt denies pain at this time. Lungs clear diminished. Pedal pulses per doppler, dressings to bilateral lower legs intact no drainage noted. No edema noted. Abdomen soft rounded bs present.  Care endorsed to onco

## 2019-11-25 NOTE — PHYSICAL THERAPY NOTE
PHYSICAL THERAPY TREATMENT NOTE - INPATIENT    Room Number: 5849/7154-Q     Session: 6   Number of Visits to Meet Established Goals: (5 additional sessions added for total of 9)    Presenting Problem:  B LE ulcers  History related to current admission: Pt AM-PAC '6-Clicks' INPATIENT SHORT FORM - BASIC MOBILITY  How much difficulty does the patient currently have. ..  -   Turning over in bed (including adjusting bedclothes, sheets and blankets)?: A Lot   -   Sitting down on and standing up from a maryam presents with the following impairments: decreased strength/ROM BLEs, decreased safety awareness , decreased balance, gait dysfunction. Functional outcome measures completed include AMPAC.   Pt most limited with mobility secondary to pain  These impairments

## 2019-11-25 NOTE — PAYOR COMM NOTE
--------------  CONTINUED STAY REVIEW    Earline Stark COMPLETE MMAI  Subscriber #:  [id number]  Authorization Number: [id number]    Admit date: 11/2/19  Admit time: 2321    Admitting Physician: Yari Moeller MD  Attending Physician:  DO MARY KATE Wilkerson skin biopsy. Appreciate consultants.     Bell GRANT  8:49 AM     PT TREATMENT NOTE - INPATIENT  The patient is below baseline and would benefit from skilled inpatient PT to address the above deficits to assist patient in returning to prior to level A, RN      oxyCODONE-acetaminophen (PERCOCET) 5-325 MG per tab 1 tablet     Date Action Dose Route User    11/25/2019 7695 Given 1 tablet Oral Nishi Coleman    11/24/2019 1558 Given 1 tablet Oral Wolf Álvarez RN      oxyCODONE-acetaminophen (PERCOCET)

## 2019-11-25 NOTE — PROGRESS NOTES
ADA HOSPITALIST  Progress Note     Malik Hart Patient Status:  Inpatient    1945 MRN [id number]   [facility] 7NE-A Attending Dr. Kandy Taylor Day # 21 PCP PHYSICIAN NONSTAFF     Chief Complaint: leg pain    S: Patient reports Pantoprazole Sodium  40 mg Oral QAM AC   • Alfuzosin HCl ER  10 mg Oral Daily with breakfast   • sodium chloride 0.9%  500 mL Intravenous Once   • docusate sodium  100 mg Oral BID   • Clopidogrel Bisulfate  75 mg Oral Daily   • metoprolol tartrate  12.5 mg dressed/wrapped      A/P  13. B/L LE necrotic ulcers secondary to strep C/PAD  1. Antibiotics per ID  2. ASA/plavix for PAD  1. Revascularization not planned at this time   3. Not c/w pyoderma gangrenosum per derm  1. Biopsy path pending   4.  ANCA panel ne

## 2019-11-26 PROCEDURE — 99233 SBSQ HOSP IP/OBS HIGH 50: CPT | Performed by: INTERNAL MEDICINE

## 2019-11-26 PROCEDURE — 99232 SBSQ HOSP IP/OBS MODERATE 35: CPT | Performed by: HOSPITALIST

## 2019-11-26 RX ORDER — METHYLPREDNISOLONE SODIUM SUCCINATE 125 MG/2ML
125 INJECTION, POWDER, LYOPHILIZED, FOR SOLUTION INTRAMUSCULAR; INTRAVENOUS EVERY 12 HOURS
Status: DISCONTINUED | OUTPATIENT
Start: 2019-11-26 | End: 2019-11-26

## 2019-11-26 RX ORDER — LISINOPRIL 20 MG/1
20 TABLET ORAL DAILY
Qty: 30 TABLET | Refills: 0 | Status: SHIPPED | OUTPATIENT
Start: 2019-11-26 | End: 2019-11-27

## 2019-11-26 RX ORDER — PREDNISONE 10 MG/1
TABLET ORAL
Qty: 75 TABLET | Refills: 0 | Status: SHIPPED | OUTPATIENT
Start: 2019-11-26 | End: 2019-11-27

## 2019-11-26 NOTE — PROGRESS NOTES
ADA HOSPITALIST  Progress Note     Jane Pablo Patient Status:  Inpatient    1945 MRN [id number]   [facility] 7NE-A Attending Dr. Josie Mathur Day # 25 PCP PHYSICIAN NONSTAFF     Chief Complaint: leg pain    S: Patient no compl Clopidogrel Bisulfate  75 mg Oral Daily   • metoprolol tartrate  12.5 mg Oral 2x Daily(Beta Blocker)     Final Diagnosis:   Skin, left lower leg, biopsy:  -Focal perivascular inflammation with reactive endothelial changes.  -See comment.      Electronicall Full    Estimated date of discharged: Once cleared by consultants. NED per PT recs and for wound care.      Case d/w patient, RN. Biopsy results reviewed. F/u on derm/rheum recs.      Chelsey GRANT  8:48 AM     Hospitalist Addendum     S: Patient see

## 2019-11-26 NOTE — PROGRESS NOTES
[facility]                INFECTIOUS DISEASE PROGRESS NOTE    Ekaterinabrandon Alexander Patient Status:  Inpatient    1945 MRN [id number]   [facility] 7NE-A Attending Yari Moeller MD   Hosp Day # 25 PCP PHYSICIAN NONSTAFF     Antibiot

## 2019-11-26 NOTE — PROGRESS NOTES
[facility]  Rheumatology Progress Note  Prudence Clas Patient Status:  Inpatient    1945 MRN [id number]   [facility] 7NE-A Attending Lazarus Handy, [address] [facility] Day # 24 PCP PHYSICIAN NONSTAFF     Date of Admission: 2019  Date review, she was following at wound clinic and going through hyperbaric therapy for her wounds. Since this hospitalization, pt has had multiple vascular studies to evaluate her lesions further.  She has been diagnosed with cellulitisi as well as bacterem 11/27/2019] predniSONE (DELTASONE) tab 30 mg, 30 mg, Oral, BID with meals  Doxycycline Hyclate (VIBRAMYCIN) cap 100 mg, 100 mg, Oral, 2 times per day  Enoxaparin Sodium (LOVENOX) 40 MG/0.4ML injection 40 mg, 40 mg, Subcutaneous, Daily  aspirin chewable tab Or  oxyCODONE-acetaminophen (PERCOCET) 5-325 MG per tab 2 tablet, 2 tablet, Oral, Q4H PRN  influenza vaccine (PF)(FLUZONE HD) high dose for 65 yrs & older inj 0.5ml, 0.5 mL, Intramuscular, Prior to discharge  [COMPLETED] Potassium Chloride ER (K-DUR M20) C IVPB premix 20 mEq, 20 mEq, Intravenous, Once  [COMPLETED] Heparin Sodium (Porcine) 5000 UNIT/ML injection 3,700 Units, 60 Units/kg, Intravenous, Once  [COMPLETED] heparin (PORCINE) 42155hoizo/250mL infusion INITIAL DOSE, 12 Units/kg/hr, Intravenous, Once normal, + murmur  Lungs: Clear without wheezes, rales, rhonchi or dullness. Normal excursions and effort. Abdomen: Soft, non-tender. Extremities: distal legs wrapped, preferred that I not unwrap them.  Images under media reviewed from earlier in [facility] =====  CONCLUSION:  Diminished toe brachial indices bilaterally consistent with moderate degree of arterial obstruction at the level of the feet. Ankle brachial indices are within normal limits bilaterally.    Dictated by: Ralph Wild MD on 11/20/201 Correlation for any symptoms and for recent procedure in this region is suggested. Dictated by: Madison Gutiérrez MD on 11/04/2019 at 12:54           Cardiologist: Raeanne Gall Olive Paget, MD  Primary Proceduralist: Raeanne Gall Olive Paget, MD  Procedure Performed: changes.  -See comment.      Electronically signed by Suman Lira MD on 11/26/2019 at  7:17 AM      Final Diagnosis Comment    Sections show epidermal hyperplasia with spongiosis, thickened granular layer and mild hyperkeratosis.   There is focal superfic evaluation.      Assessment:   Leukocytoclastic vasculitis   Necrotic leg wounds  Cellulitis  Bacteremia  PAD with hx of balloon angioplasty in August  HTN  HLD  Aortic stenosis  Hx of anemia     Plan:  -- serologies for vasculitis negative however biopsy s

## 2019-11-26 NOTE — PLAN OF CARE
Received pt a/ox4, RA, NSR/SB on tele at 0700  Pt c/o pain to R/L leg, relieved with pain meds per MAR  Pt started on steroids today per Dr. Tobin Aguillon R/L lower leg dressings, see flowsheet  Changed mepilex on sacrum  Consulted Dr. Silviano Velazco, will be frequently for physical needs  - Identify cognitive and physical deficits and behaviors that affect risk of falls.   - [place] fall precautions as indicated by assessment.  - Educate pt/family on patient safety including physical limitations  - Instruct p highest/safest level of independence in self care  Description  Interventions:  - Assess ability and encourage patient to participate in ADLs to maximize function  - Promote sitting position while performing ADLs such as feeding, grooming, and bathing  - E

## 2019-11-27 ENCOUNTER — TELEPHONE (OUTPATIENT)
Dept: RHEUMATOLOGY | Facility: CLINIC | Age: 74
End: 2019-11-27

## 2019-11-27 VITALS
TEMPERATURE: 98 F | DIASTOLIC BLOOD PRESSURE: 48 MMHG | RESPIRATION RATE: 20 BRPM | OXYGEN SATURATION: 99 % | SYSTOLIC BLOOD PRESSURE: 128 MMHG | BODY MASS INDEX: 28.19 KG/M2 | HEIGHT: 57.99 IN | HEART RATE: 55 BPM | WEIGHT: 134.31 LBS

## 2019-11-27 PROCEDURE — 99239 HOSP IP/OBS DSCHRG MGMT >30: CPT | Performed by: HOSPITALIST

## 2019-11-27 PROCEDURE — 99223 1ST HOSP IP/OBS HIGH 75: CPT | Performed by: INTERNAL MEDICINE

## 2019-11-27 RX ORDER — DOXEPIN HYDROCHLORIDE 50 MG/1
1 CAPSULE ORAL DAILY
Qty: 30 TABLET | Refills: 0 | Status: SHIPPED | OUTPATIENT
Start: 2019-11-28

## 2019-11-27 RX ORDER — FOLIC ACID 1 MG/1
1 TABLET ORAL DAILY
Qty: 30 TABLET | Refills: 0 | Status: SHIPPED | OUTPATIENT
Start: 2019-11-28

## 2019-11-27 RX ORDER — FOLIC ACID 1 MG/1
1 TABLET ORAL DAILY
Status: DISCONTINUED | OUTPATIENT
Start: 2019-11-27 | End: 2019-11-27

## 2019-11-27 RX ORDER — LISINOPRIL 30 MG/1
30 TABLET ORAL DAILY
Qty: 30 TABLET | Refills: 0 | Status: SHIPPED | OUTPATIENT
Start: 2019-11-28

## 2019-11-27 RX ORDER — DOXEPIN HYDROCHLORIDE 50 MG/1
1 CAPSULE ORAL DAILY
Status: DISCONTINUED | OUTPATIENT
Start: 2019-11-27 | End: 2019-11-27

## 2019-11-27 RX ORDER — PREDNISONE 10 MG/1
TABLET ORAL
Qty: 28 TABLET | Refills: 0 | Status: SHIPPED | OUTPATIENT
Start: 2019-11-27 | End: 2019-12-17

## 2019-11-27 NOTE — PROGRESS NOTES
[facility]                INFECTIOUS DISEASE PROGRESS NOTE    Josesito Pratt Patient Status:  Inpatient    1945 MRN [id number]   [facility] 7NE-A Attending Inocente Homans, MD   Hosp Day # 25 PCP PHYSICIAN NONSTAFF     Antibiot

## 2019-11-27 NOTE — PAYOR COMM NOTE
--------------  CONTINUED STAY REVIEW    [facility]veria Combe [facility]PLETE [facility]  Subscriber #:  [id number]  Authorization Number: [id number]    Admit date: 11/2/19  Admit time: 2321    Admitting Physician: Lynn Gerard MD  Attending Physician:  DO MARY KATE Castillo anusol  5. Anemia 2/2 acute blood loss from wounds, hemorrhoids and dilutional, YOLANDA, stable  1. S/p venofer x 3, PRBC x1 (11/11/19)  6. HTN, controlled   7. Urinary retention resolved  1. Alfuzosin  8. AZUCENA Resolved   9. Chest pain, resolved  1.  ASA, plavix (DILAUDID) 1 MG/ML injection 0.4 mg     Date Action Dose Route User    11/26/2019 1733 Given 0.4 mg Intravenous Clara Soriano RN    11/26/2019 1257 Given 0.4 mg Intravenous Hanh Ramirez, RN      lisinopril tab 20 mg     Date Action Dose Route User

## 2019-11-27 NOTE — CONSULTS
Hem/Onc Report of Consultation    Patient Name: Dianne Perales   YOB: 1945   Medical Record Number: [id number]   CSN: [id number]   Consulting Physician: Dr. Alida Fisher  Referring Provider(s): Dr. Mary Ann Malin  Date of Consultation: 11/27/2019     Re function is normal. Has not noticed swollen glands/lymph nodes. She had fever at home, but has been afebrile here. Daughter present in room.  Son is translating over the phone, per the patient and family's request.       Past Medical History:  Past Med file        Relationship status: Not on file      Intimate partner violence:        Fear of current or ex partner: Not on file        Emotionally abused: Not on file        Physically abused: Not on file        Forced sexual activity: Not on file    Other mg, 200 mg, Intravenous, Daily  [COMPLETED] 0.9% NaCl infusion, , Intravenous, Once  Pantoprazole Sodium (PROTONIX) EC tab 40 mg, 40 mg, Oral, QAM AC  [COMPLETED] sodium chloride 0.9% IV bolus 250 mL, 250 mL, Intravenous, Once  Alfuzosin HCl ER (UROXATRAL) Intravenous, ONCE PRN  nitroGLYCERIN (NITROSTAT) SL tab 0.4 mg, 0.4 mg, Sublingual, Q5 Min PRN  acetaminophen (TYLENOL) tab 650 mg, 650 mg, Oral, Q6H PRN  ondansetron HCl (ZOFRAN) injection 4 mg, 4 mg, Intravenous, Q6H PRN  [COMPLETED] potassium chloride I Known Allergies      Vital Signs:  BP (!) 173/50 (BP Location: Left arm)   Pulse 50   Temp 98.2 °F (36.8 °C) (Oral)   Resp 20   Ht 1.473 m (4' 9.99\")   Wt 60.9 kg (134 lb 4.8 oz)   SpO2 100%   BMI 28.08 kg/m²     Physical Examination:  General: Patient is x10(3) uL    Immature Granulocyte Absolute 0.03 0.00 - 1.00 x10(3) uL    Neutrophil % 74.5 %    Lymphocyte % 17.0 %    Monocyte % 7.3 %    Eosinophil % 0.0 %    Basophil % 0.6 %    Immature Granulocyte % 0.6 %   CREATININE, SERUM    Collection Time: 11/27/ with oral Folic Acid 1mg daily. If her anemia does not recover, as her leg wounds heal, further workup may be indicated. Leg wounds/Vasculitis: Agree with steroids, as instituted by rheumatology, and antibiotics for the superinfection.   Will defer long

## 2019-11-27 NOTE — PROGRESS NOTES
[facility]                INFECTIOUS DISEASE PROGRESS NOTE    Andria Jaspal Patient Status:  Inpatient    1945 MRN [id number]   [facility] 7NE-A Attending Sid Sumner MD   Hosp Day # 25 PCP PHYSICIAN NONSTAFF     Antibiot

## 2019-11-27 NOTE — CM/SW NOTE
Updates sent to [address] [place] [address] via ecin - pt maybe ready for d/c today. However, insurance Read Kerry has not yet been received.

## 2019-11-27 NOTE — PROGRESS NOTES
NURSING DISCHARGE NOTE    All consults signed off on patient. Discharge AVS printed and put in envelope for RN and [employer]. Scripts printed. Oxycodone script signed and placed with discharge AVS. Report given to [address] at [facility].  Family updated ab

## 2019-11-27 NOTE — TELEPHONE ENCOUNTER
Tried calling patient on 11-27-19 to schedule her an appt with Dr Santos Hernández for 12-27-19, but cannot leave a message her voice mail is full.

## 2019-11-27 NOTE — PROGRESS NOTES
[facility]    Progress Note    Dallashugo Willis Patient Status:  Inpatient    1945 MRN [id number]   [facility] 7NE-A Attending Alfredo Kelley, [address] [facility] Day # 24 PCP PHYSICIAN NONSTAFF        Subjective:     HENT: Negative for mike Hanh Platt MD on 11/26/2019 at 1501    Final Diagnosis Comment  Sections show epidermal hyperplasia with spongiosis, thickened granular layer and mild hyperkeratosis.   There is focal superficial perivascular inflammation formed predominantly of lymphocytes and

## 2019-11-27 NOTE — PHYSICAL THERAPY NOTE
PHYSICAL THERAPY TREATMENT NOTE - INPATIENT    Room Number: 2083/2472-Z     Session: 6   Number of Visits to Meet Established Goals: (5 additional sessions added for total of 9)    Presenting Problem:  B LE ulcers  History related to current admission: Pt AM-PAC '6-Clicks' INPATIENT SHORT FORM - BASIC MOBILITY  How much difficulty does the patient currently have. ..  -   Turning over in bed (including adjusting bedclothes, sheets and blankets)?: A Little   -   Sitting down on and standing up from a c impairments: decreased strength/ROM BLEs, decreased safety awareness , decreased balance, gait dysfunction. Functional outcome measures completed include AMPAC. mobility remains limited due to dec ROM, pain and fatigue.    These impairments and comorbiditie

## 2019-11-27 NOTE — CM/SW NOTE
Pt is ready for d/c today. Brandi Marcelino from [address] [facility] said that they received insurance auth. Called [employer] to coordinate d/c time. RN to call report to [phone].   Called [facility] [phone] to set up transportation - completed d/c

## 2019-11-27 NOTE — PLAN OF CARE
Assumed care at [address] [facility]. Pt a/ox4, Haitian speaking. VSS. NSR/SB per tele. RA. Denies pain. BLE dressing, c/d/I. Contact isolation maintained. On PO abx. Pt updated w/ POC. Will continue to monitor.     Problem: Patient/Family Goals  Goal: Patient/Famil precautions as indicated by assessment.  - Educate pt/family on patient safety including physical limitations  - Instruct pt to call for assistance with activity based on assessment  - Modify environment to reduce risk of injury  - Provide assistive device maximize function  - Promote sitting position while performing ADLs such as feeding, grooming, and bathing  - Educate and encourage patient/family in tolerated functional activity level and precautions during self-care     Outcome: Progressing

## 2019-11-27 NOTE — OCCUPATIONAL THERAPY NOTE
OCCUPATIONAL THERAPY TREATMENT NOTE - INPATIENT     Room Number: 2862/5288-J  Session: 5   Number of Visits to Meet Established Goals: 7    Presenting Problem: RLE cellulitis, BLE wounds; PAD, AZUCENA    History related to current admission: Patient is a 73y/o (peripheral artery disease) ([facility] [address]    AZUCENA (acute kidney injury) ([facility] [address] [facility]    Hyponatremia    Anemia    Urinary retention      Past Medical History  Past Medical History:   Diagnosis Date   • Essential hypertension    • Hyperlipidemia        Past Surgical Histor target by the room door, pt started to take steps with rollator. Seated rest breaks between each walk. Refer to PT note. Pt completed B UE AROM, 5 sets, 10 reps each independently. Patient End of Session: Up in chair;Needs met;Call light within reach; Al

## 2019-11-27 NOTE — CM/SW NOTE
[facility] know pt needs OP f/u in the wound care clinic. They will work with family to transport to/from wound care clinic for her appointment.

## 2019-11-27 NOTE — PLAN OF CARE
Problem: Patient/Family Goals  Goal: Patient/Family Long Term Goal  Description  Patient's Long Term Goal: Go home with highest functional ability  Interventions:  - Ambulation  Pain control  - See additional Care Plan goals for specific interventions activity based on assessment  - Modify environment to reduce risk of injury  - Provide assistive devices as appropriate  - Consider OT/PT consult to assist with strengthening/mobility  - Encourage toileting schedule  Outcome: Adequate for Discharge     Pro Educate and encourage patient/family in tolerated functional activity level and precautions during self-care     Outcome: Adequate for Discharge

## 2019-11-27 NOTE — PROGRESS NOTES
[facility] HOSPITALIST  Progress Note     Carlos Reusing Patient Status:  Inpatient    1945 MRN [id number]   [facility] 7NE-A Attending Dr. Wesley Stern Day # 22 PCP PHYSICIAN NONSTAFF     Chief Complaint: leg pain    S: Patient no compl mg Oral 2 times per day   • enoxaparin  40 mg Subcutaneous Daily   • aspirin  81 mg Oral Daily   • collagenase   Topical Daily   • Pantoprazole Sodium  40 mg Oral QAM AC   • Alfuzosin HCl ER  10 mg Oral Daily with breakfast   • sodium chloride 0.9%  500 mL resolved  1. Alfuzosin  8. AZUCENA Resolved   9. Chest pain, resolved  1. ASA, plavix, bb.    2. Echo with preserved EF. ST test neg for ischemia  10. Hyponatremia, Resolved   11. Folic deficiency  1. Folic/MVI, hem evaluated. 12. Deconditioning  1.  PT/OT,

## 2019-11-27 NOTE — WOUND PROGRESS NOTE
[facility]  Inpatient Wound Care Contact Note    Abdi Erickson Patient Status:  Inpatient    1945 MRN [id number]   [facility] 7NE-A Attending Paige Reynolds DO   Hosp Day # 25 PCP PHYSICIAN NONSTAFF     Talked to RN, possible dis

## 2019-11-27 NOTE — DISCHARGE SUMMARY
[facility] HOSPITALIST  DISCHARGE SUMMARY     All Cross Patient Status:  Inpatient    1945 MRN [id number]   [facility] 7NE-A Attending Louis Ferris, [address] [facility] Day # 25 PCP PHYSICIAN NONSTAFF     Date of Admission: 2019  Date of a doctor since. Denies any fever, chills, shortness of breath, nausea/vomiting, abdominal pain. No current chest pain but did have some substernal, mild chest pain earlier when the pain in her leg was severe.     Brief Synopsis: Patient is a 76yo female w follow-up with PCP after rehab, orthopedics/rheumatology/hematology/cardiology/vascular as recommended. Lace+ Score: 49  59-90 High Risk  29-58 Medium Risk  0-28   Low Risk         TCM Follow-Up Recommendation:  LACE 29-58:  Moderate Risk of readmiss legs  Indication: Peripheral arterial disease          Consultants: Cardiology, infectious disease, vascular surgery, wound care, nephrology, orthopedics, GI, dermatology, rheumatology         Discharge Medications      START taking these medications tablet (40 mg total) by mouth every morning before breakfast.   Quantity:  30 tablet  Refills:  0     predniSONE 10 MG Tabs  Commonly known as:  Natalee Mao  Start taking on:  November 27, 2019      Take 3 tablets (30 mg total) by mouth 2 (two) times daily f a paper prescription for each of these medications  · Alfuzosin HCl ER 10 MG Tb24  · collagenase 250 UNIT/GM Oint  · docusate sodium 100 MG Caps  · Doxycycline Hyclate 581 MG Caps  · folic acid 1 MG Tabs  · Hydrocortisone Acetate 25 MG Supp  · lisinopril 3

## 2019-12-06 NOTE — PAYOR COMM NOTE
--------------  REQUESTING APPROVAL FOR ALL DAYS UP TO ( but not including)  11/27/19    PLEASE FAX  DAYS AUTHORIZED  [employer]      DISCHARGE REVIEW    Payor: [address] #:  [id number]  Authorization Number: [id number]    Admit date: deficiency  14. Deconditioning    History of Present Illness: Abdi Erickson is a 75year old female with PMH hypertension, hyperlipidemia, PAD who presents with right lower extremity pain.   Patient has a history of bilateral lower extremity wounds due to 11/15 which showed two-vessel runoff on the right and single-vessel runoff on the left. Vascular surgery reevaluated and felt wound care only, may need future revascularization if no improvement in next 2 to 3 weeks.   There was concern for possible vascul Normal duodenum to examined extent.        PREOPERATIVE DIAGNOSIS/INDICATION: Anemia, hematochezia     POSTOPERTATIVE DIAGNOSIS: Internal hemorrhoids, external hemorrhoids, cecal ulceration, colon polyps, diverticulosis      PROCEDURE PERFORMED: COLONOSCOPY as: Leanora Sale  Start taking on:  November 28, 2019      Take 1 tablet (1 mg total) by mouth daily.    Quantity:  30 tablet  Refills:  0     Hydrocortisone Acetate 25 MG Supp  Commonly known as:  ANUSOL-HC      Place 1 suppository (25 mg total) rectally 2 (tw take      Take 1 tablet (30 mg total) by mouth daily.    Quantity:  30 tablet  Refills:  0        CONTINUE taking these medications      Instructions Prescription details   acetaminophen 325 MG Tabs  Commonly known as:  TYLENOL      Take 650 mg by mouth miquel One Armond [address]  [phone]          -----------------------------------------------------------------------------------------------  PATIENT DISCHARGE INSTRUCTIONS: See electronic chart    RUDY Faith    Time sp

## 2019-12-11 ENCOUNTER — OFFICE VISIT (OUTPATIENT)
Dept: CARDIOLOGY | Age: 74
End: 2019-12-11

## 2019-12-11 VITALS
WEIGHT: 124 LBS | HEART RATE: 56 BPM | DIASTOLIC BLOOD PRESSURE: 40 MMHG | BODY MASS INDEX: 26.03 KG/M2 | HEIGHT: 58 IN | SYSTOLIC BLOOD PRESSURE: 80 MMHG

## 2019-12-11 DIAGNOSIS — S81.801D OPEN WOUND OF RIGHT LOWER LEG, SUBSEQUENT ENCOUNTER: ICD-10-CM

## 2019-12-11 DIAGNOSIS — I73.9 PAD (PERIPHERAL ARTERY DISEASE) (CMD): Primary | ICD-10-CM

## 2019-12-11 DIAGNOSIS — I10 ESSENTIAL HYPERTENSION: ICD-10-CM

## 2019-12-11 PROCEDURE — 3078F DIAST BP <80 MM HG: CPT | Performed by: NURSE PRACTITIONER

## 2019-12-11 PROCEDURE — 99213 OFFICE O/P EST LOW 20 MIN: CPT | Performed by: NURSE PRACTITIONER

## 2019-12-11 PROCEDURE — 3074F SYST BP LT 130 MM HG: CPT | Performed by: NURSE PRACTITIONER

## 2019-12-11 RX ORDER — PSEUDOEPHEDRINE HCL 30 MG
100 TABLET ORAL
COMMUNITY
Start: 2019-11-18

## 2019-12-11 RX ORDER — ALFUZOSIN HYDROCHLORIDE 10 MG/1
10 TABLET, EXTENDED RELEASE ORAL
COMMUNITY
Start: 2019-11-19

## 2019-12-11 RX ORDER — LISINOPRIL 30 MG/1
30 TABLET ORAL
COMMUNITY
Start: 2019-11-28

## 2019-12-11 RX ORDER — PREDNISONE 10 MG/1
TABLET ORAL
COMMUNITY
Start: 2019-11-27 | End: 2019-12-17

## 2019-12-11 RX ORDER — DOXYCYCLINE HYCLATE 100 MG/1
100 CAPSULE ORAL
COMMUNITY
Start: 2019-11-25 | End: 2019-12-25

## 2019-12-11 RX ORDER — ACETAMINOPHEN 325 MG/1
650 TABLET ORAL
COMMUNITY

## 2019-12-11 RX ORDER — DOXEPIN HYDROCHLORIDE 50 MG/1
1 CAPSULE ORAL
COMMUNITY
Start: 2019-11-28

## 2019-12-11 RX ORDER — PANTOPRAZOLE SODIUM 40 MG/1
40 TABLET, DELAYED RELEASE ORAL
COMMUNITY
Start: 2019-11-19

## 2019-12-11 RX ORDER — OXYCODONE HYDROCHLORIDE AND ACETAMINOPHEN 5; 325 MG/1; MG/1
1 TABLET ORAL
COMMUNITY
Start: 2019-11-18

## 2019-12-11 RX ORDER — HYDROCORTISONE ACETATE 25 MG/1
25 SUPPOSITORY RECTAL
COMMUNITY
Start: 2019-11-18

## 2019-12-11 RX ORDER — FOLIC ACID 1 MG/1
1 TABLET ORAL
COMMUNITY
Start: 2019-11-28

## 2019-12-11 RX ORDER — SENNOSIDES A AND B 8.6 MG/1
8.6 TABLET, FILM COATED ORAL
COMMUNITY
Start: 2019-11-18

## 2019-12-11 SDOH — HEALTH STABILITY: PHYSICAL HEALTH: ON AVERAGE, HOW MANY MINUTES DO YOU ENGAGE IN EXERCISE AT THIS LEVEL?: 0 MIN

## 2019-12-11 SDOH — SOCIAL STABILITY: SOCIAL NETWORK: ARE YOU MARRIED, WIDOWED, DIVORCED, SEPARATED, NEVER MARRIED, OR LIVING WITH A PARTNER?: WIDOWED

## 2019-12-11 SDOH — HEALTH STABILITY: MENTAL HEALTH: HOW OFTEN DO YOU HAVE A DRINK CONTAINING ALCOHOL?: NEVER

## 2019-12-11 SDOH — HEALTH STABILITY: PHYSICAL HEALTH: ON AVERAGE, HOW MANY DAYS PER WEEK DO YOU ENGAGE IN MODERATE TO STRENUOUS EXERCISE (LIKE A BRISK WALK)?: 0 DAYS

## 2019-12-11 ASSESSMENT — PATIENT HEALTH QUESTIONNAIRE - PHQ9
1. LITTLE INTEREST OR PLEASURE IN DOING THINGS: NOT AT ALL
SUM OF ALL RESPONSES TO PHQ9 QUESTIONS 1 AND 2: 0
2. FEELING DOWN, DEPRESSED OR HOPELESS: NOT AT ALL
SUM OF ALL RESPONSES TO PHQ9 QUESTIONS 1 AND 2: 0

## 2019-12-11 ASSESSMENT — ENCOUNTER SYMPTOMS
SUSPICIOUS LESIONS: 0
WEIGHT LOSS: 0
FEVER: 0
BRUISES/BLEEDS EASILY: 0
WEIGHT GAIN: 0
ALLERGIC/IMMUNOLOGIC COMMENTS: NO NEW FOOD ALLERGIES
COUGH: 0
CHILLS: 0
HEMOPTYSIS: 0
HEMATOCHEZIA: 0

## 2020-01-07 ENCOUNTER — APPOINTMENT (OUTPATIENT)
Dept: WOUND CARE | Facility: HOSPITAL | Age: 75
End: 2020-01-07
Payer: MEDICARE

## 2020-01-08 ENCOUNTER — APPOINTMENT (OUTPATIENT)
Dept: HEMATOLOGY/ONCOLOGY | Age: 75
End: 2020-01-08
Attending: INTERNAL MEDICINE
Payer: MEDICARE

## 2020-01-15 ENCOUNTER — APPOINTMENT (OUTPATIENT)
Dept: CARDIOLOGY | Age: 75
End: 2020-01-15

## 2020-01-21 ENCOUNTER — OFFICE VISIT (OUTPATIENT)
Dept: CARDIOLOGY | Age: 75
End: 2020-01-21

## 2020-01-21 VITALS
HEART RATE: 68 BPM | BODY MASS INDEX: 21.66 KG/M2 | WEIGHT: 130 LBS | DIASTOLIC BLOOD PRESSURE: 50 MMHG | HEIGHT: 65 IN | SYSTOLIC BLOOD PRESSURE: 90 MMHG

## 2020-01-21 DIAGNOSIS — I73.9 PAD (PERIPHERAL ARTERY DISEASE) (CMD): Primary | ICD-10-CM

## 2020-01-21 DIAGNOSIS — I10 ESSENTIAL HYPERTENSION: ICD-10-CM

## 2020-01-21 DIAGNOSIS — R94.39 ABNORMAL CARDIOVASCULAR STRESS TEST: ICD-10-CM

## 2020-01-21 DIAGNOSIS — S81.801D OPEN WOUND OF RIGHT LOWER LEG, SUBSEQUENT ENCOUNTER: ICD-10-CM

## 2020-01-21 PROCEDURE — 3074F SYST BP LT 130 MM HG: CPT | Performed by: NURSE PRACTITIONER

## 2020-01-21 PROCEDURE — 3078F DIAST BP <80 MM HG: CPT | Performed by: NURSE PRACTITIONER

## 2020-01-21 PROCEDURE — 99213 OFFICE O/P EST LOW 20 MIN: CPT | Performed by: NURSE PRACTITIONER

## 2020-01-21 RX ORDER — POTASSIUM CHLORIDE 20 MEQ/1
20 TABLET, EXTENDED RELEASE ORAL 2 TIMES DAILY
COMMUNITY

## 2020-01-21 RX ORDER — HYDROCHLOROTHIAZIDE 25 MG/1
25 TABLET ORAL DAILY
COMMUNITY

## 2020-01-21 RX ORDER — AMLODIPINE BESYLATE 5 MG/1
5 TABLET ORAL DAILY
COMMUNITY
End: 2020-01-21 | Stop reason: ALTCHOICE

## 2020-01-21 RX ORDER — AMLODIPINE BESYLATE 10 MG/1
5 TABLET ORAL DAILY
COMMUNITY

## 2020-01-21 SDOH — HEALTH STABILITY: MENTAL HEALTH: HOW OFTEN DO YOU HAVE A DRINK CONTAINING ALCOHOL?: NEVER

## 2020-01-21 ASSESSMENT — PATIENT HEALTH QUESTIONNAIRE - PHQ9
2. FEELING DOWN, DEPRESSED OR HOPELESS: NOT AT ALL
1. LITTLE INTEREST OR PLEASURE IN DOING THINGS: NOT AT ALL
SUM OF ALL RESPONSES TO PHQ9 QUESTIONS 1 AND 2: 0
SUM OF ALL RESPONSES TO PHQ9 QUESTIONS 1 AND 2: 0

## 2020-01-21 ASSESSMENT — ENCOUNTER SYMPTOMS
DECREASED APPETITE: 0
GASTROINTESTINAL NEGATIVE: 1
DIAPHORESIS: 0
WEIGHT GAIN: 0
SYNCOPE: 0
SHORTNESS OF BREATH: 0
WEIGHT LOSS: 0
NEUROLOGICAL NEGATIVE: 1

## 2020-03-03 ENCOUNTER — TELEPHONE (OUTPATIENT)
Dept: CARDIOLOGY | Age: 75
End: 2020-03-03

## 2021-03-22 ENCOUNTER — IMMUNIZATION (OUTPATIENT)
Dept: LAB | Age: 76
End: 2021-03-22

## 2021-03-22 DIAGNOSIS — Z23 NEED FOR VACCINATION: Primary | ICD-10-CM

## 2021-03-22 PROCEDURE — 91300 COVID 19 PFIZER-BIONTECH: CPT | Performed by: HOSPITALIST

## 2021-03-22 PROCEDURE — 0001A COVID 19 PFIZER-BIONTECH: CPT | Performed by: HOSPITALIST

## 2021-04-12 ENCOUNTER — IMMUNIZATION (OUTPATIENT)
Dept: LAB | Age: 76
End: 2021-04-12
Attending: HOSPITALIST

## 2021-04-12 DIAGNOSIS — Z23 NEED FOR VACCINATION: Primary | ICD-10-CM

## 2021-04-12 PROCEDURE — 0002A COVID 19 PFIZER-BIONTECH: CPT

## 2021-04-12 PROCEDURE — 91300 COVID 19 PFIZER-BIONTECH: CPT

## 2022-01-01 NOTE — PROGRESS NOTES
[facility]                INFECTIOUS DISEASE PROGRESS NOTE    Akira Mcnamara Patient Status:  Inpatient    1945 MRN [id number]   [facility] 7NE-A Attending Alize Araiza MD   Hosp Day # 12 PCP PHYSICIAN NONSTAFF     Antibiot Collection Time: 11/02/19  8:06 PM   Result Value Ref Range    Aerobic Culture Result  N/A     Mixture of organisms suggestive of normal skin lionel    Aerobic Culture Result 3+ growth Streptococcus Group C (A) N/A    Aerobic Culture Result 2+ growth Sta No restrictions

## 2022-06-21 ENCOUNTER — APPOINTMENT (RX ONLY)
Dept: URBAN - METROPOLITAN AREA CLINIC 151 | Facility: CLINIC | Age: 77
Setting detail: DERMATOLOGY
End: 2022-06-21

## 2022-06-21 DIAGNOSIS — L21.8 OTHER SEBORRHEIC DERMATITIS: ICD-10-CM

## 2022-06-21 PROCEDURE — ? PRESCRIPTION

## 2022-06-21 PROCEDURE — ? COUNSELING

## 2022-06-21 PROCEDURE — 99203 OFFICE O/P NEW LOW 30 MIN: CPT

## 2022-06-21 RX ORDER — BETAMETHASONE DIPROPIONATE 0.5 MG/ML
LOTION, AUGMENTED TOPICAL
Qty: 60 | Refills: 3 | Status: ERX | COMMUNITY
Start: 2022-06-21

## 2022-06-21 RX ADMIN — BETAMETHASONE DIPROPIONATE: 0.5 LOTION, AUGMENTED TOPICAL at 00:00

## 2022-06-21 ASSESSMENT — LOCATION DETAILED DESCRIPTION DERM: LOCATION DETAILED: LEFT SUPERIOR PARIETAL SCALP

## 2022-06-21 ASSESSMENT — LOCATION SIMPLE DESCRIPTION DERM: LOCATION SIMPLE: SCALP

## 2022-06-21 ASSESSMENT — LOCATION ZONE DERM: LOCATION ZONE: SCALP

## 2022-09-16 NOTE — PROGRESS NOTES
[facility] Pharmacy Note:  Renal Adjustment for cefazolin (ANCEF)    Ekaterina Alexander is a 75year old female who has been prescribed cefazolin (ANCEF) 1000 mg every 8 hrs. CrCl is estimated creatinine clearance is 31.4 mL/min (A) (based on SCr of 1.49 mg/dL (H)). normal...

## 2022-10-17 ENCOUNTER — RX ONLY (OUTPATIENT)
Age: 77
Setting detail: RX ONLY
End: 2022-10-17

## 2022-10-17 RX ORDER — BETAMETHASONE DIPROPIONATE 0.5 MG/ML
LOTION, AUGMENTED TOPICAL
Qty: 60 | Refills: 3 | Status: ERX

## 2022-11-28 ENCOUNTER — APPOINTMENT (RX ONLY)
Dept: URBAN - METROPOLITAN AREA CLINIC 151 | Facility: CLINIC | Age: 77
Setting detail: DERMATOLOGY
End: 2022-11-28

## 2022-11-28 DIAGNOSIS — L60.0 INGROWING NAIL: ICD-10-CM

## 2022-11-28 DIAGNOSIS — L20.89 OTHER ATOPIC DERMATITIS: ICD-10-CM

## 2022-11-28 DIAGNOSIS — L21.8 OTHER SEBORRHEIC DERMATITIS: ICD-10-CM

## 2022-11-28 PROBLEM — L20.84 INTRINSIC (ALLERGIC) ECZEMA: Status: ACTIVE | Noted: 2022-11-28

## 2022-11-28 PROCEDURE — 99213 OFFICE O/P EST LOW 20 MIN: CPT

## 2022-11-28 PROCEDURE — ? COUNSELING

## 2022-11-28 PROCEDURE — ? PRESCRIPTION

## 2022-11-28 RX ORDER — MOMETASONE FUROATE 1 MG/G
OINTMENT TOPICAL
Qty: 45 | Refills: 3 | Status: ERX | COMMUNITY
Start: 2022-11-28

## 2022-11-28 RX ORDER — BETAMETHASONE DIPROPIONATE 0.5 MG/ML
LOTION, AUGMENTED TOPICAL
Qty: 60 | Refills: 3 | Status: ERX

## 2022-11-28 RX ORDER — MUPIROCIN 20 MG/G
OINTMENT TOPICAL
Qty: 15 | Refills: 3 | Status: ERX | COMMUNITY
Start: 2022-11-28

## 2022-11-28 RX ADMIN — MUPIROCIN: 20 OINTMENT TOPICAL at 00:00

## 2022-11-28 RX ADMIN — MOMETASONE FUROATE: 1 OINTMENT TOPICAL at 00:00

## 2022-11-28 ASSESSMENT — LOCATION DETAILED DESCRIPTION DERM
LOCATION DETAILED: LEFT SUPERIOR PARIETAL SCALP
LOCATION DETAILED: LEFT ULNAR PALM
LOCATION DETAILED: LEFT SUPERIOR HELIX
LOCATION DETAILED: RIGHT GREAT TOENAIL
LOCATION DETAILED: RIGHT RADIAL PALM
LOCATION DETAILED: RIGHT SUPERIOR HELIX

## 2022-11-28 ASSESSMENT — LOCATION SIMPLE DESCRIPTION DERM
LOCATION SIMPLE: LEFT EAR
LOCATION SIMPLE: RIGHT HAND
LOCATION SIMPLE: SCALP
LOCATION SIMPLE: RIGHT EAR
LOCATION SIMPLE: LEFT HAND
LOCATION SIMPLE: RIGHT GREAT TOE

## 2022-11-28 ASSESSMENT — LOCATION ZONE DERM
LOCATION ZONE: SCALP
LOCATION ZONE: EAR
LOCATION ZONE: HAND
LOCATION ZONE: TOENAIL

## 2022-11-28 NOTE — PROCEDURE: COUNSELING
Detail Level: Detailed
Patient Specific Counseling (Will Not Stick From Patient To Patient): LN2 if no better in 2 weeks

## 2023-06-14 RX ORDER — BETAMETHASONE DIPROPIONATE 0.5 MG/ML
LOTION, AUGMENTED TOPICAL
Qty: 60 | Refills: 3 | Status: ERX

## 2024-06-21 ENCOUNTER — APPOINTMENT (OUTPATIENT)
Dept: CT IMAGING | Facility: HOSPITAL | Age: 79
DRG: 812 | End: 2024-06-21
Attending: EMERGENCY MEDICINE

## 2024-06-21 ENCOUNTER — HOSPITAL ENCOUNTER (INPATIENT)
Facility: HOSPITAL | Age: 79
LOS: 3 days | Discharge: HOME HEALTH | DRG: 812 | End: 2024-06-24
Attending: EMERGENCY MEDICINE | Admitting: INTERNAL MEDICINE

## 2024-06-21 ENCOUNTER — APPOINTMENT (OUTPATIENT)
Dept: GENERAL RADIOLOGY | Facility: HOSPITAL | Age: 79
DRG: 812 | End: 2024-06-21
Attending: EMERGENCY MEDICINE

## 2024-06-21 DIAGNOSIS — R53.1 WEAKNESS GENERALIZED: Primary | ICD-10-CM

## 2024-06-21 DIAGNOSIS — G89.29 OTHER CHRONIC PAIN: ICD-10-CM

## 2024-06-21 DIAGNOSIS — D64.9 ANEMIA, UNSPECIFIED TYPE: ICD-10-CM

## 2024-06-21 DIAGNOSIS — D64.9 SYMPTOMATIC ANEMIA: ICD-10-CM

## 2024-06-21 LAB
ALBUMIN SERPL-MCNC: 2.9 G/DL (ref 3.4–5)
ALBUMIN/GLOB SERPL: 0.8 {RATIO} (ref 1–2)
ALP LIVER SERPL-CCNC: 102 U/L
ALT SERPL-CCNC: 15 U/L
ANION GAP SERPL CALC-SCNC: 7 MMOL/L (ref 0–18)
ANTIBODY SCREEN: NEGATIVE
AST SERPL-CCNC: 12 U/L (ref 15–37)
ATRIAL RATE: 55 BPM
BASOPHILS # BLD AUTO: 0.04 X10(3) UL (ref 0–0.2)
BASOPHILS NFR BLD AUTO: 0.6 %
BILIRUB SERPL-MCNC: 0.2 MG/DL (ref 0.1–2)
BILIRUB UR QL STRIP.AUTO: NEGATIVE
BUN BLD-MCNC: 31 MG/DL (ref 9–23)
CALCIUM BLD-MCNC: 8.6 MG/DL (ref 8.5–10.1)
CHLORIDE SERPL-SCNC: 112 MMOL/L (ref 98–112)
CLARITY UR REFRACT.AUTO: CLEAR
CO2 SERPL-SCNC: 20 MMOL/L (ref 21–32)
CREAT BLD-MCNC: 0.9 MG/DL
EGFRCR SERPLBLD CKD-EPI 2021: 65 ML/MIN/1.73M2 (ref 60–?)
EOSINOPHIL # BLD AUTO: 0.11 X10(3) UL (ref 0–0.7)
EOSINOPHIL NFR BLD AUTO: 1.7 %
ERYTHROCYTE [DISTWIDTH] IN BLOOD BY AUTOMATED COUNT: 20.4 %
FLUAV + FLUBV RNA SPEC NAA+PROBE: NEGATIVE
FLUAV + FLUBV RNA SPEC NAA+PROBE: NEGATIVE
GLOBULIN PLAS-MCNC: 3.6 G/DL (ref 2.8–4.4)
GLUCOSE BLD-MCNC: 146 MG/DL (ref 70–99)
GLUCOSE UR STRIP.AUTO-MCNC: NORMAL MG/DL
HAPTOGLOB SERPL-MCNC: 172 MG/DL (ref 30–200)
HCT VFR BLD AUTO: 19.9 %
HGB BLD-MCNC: 5.6 G/DL
HGB BLD-MCNC: 6.7 G/DL
HGB RETIC QN AUTO: 17.7 PG (ref 28.2–36.6)
IMM GRANULOCYTES # BLD AUTO: 0.02 X10(3) UL (ref 0–1)
IMM GRANULOCYTES NFR BLD: 0.3 %
IMM RETICS NFR: 0.14 RATIO (ref 0.1–0.3)
IRON SATN MFR SERPL: 2 %
IRON SERPL-MCNC: 9 UG/DL
KETONES UR STRIP.AUTO-MCNC: NEGATIVE MG/DL
LACTATE SERPL-SCNC: 1.9 MMOL/L (ref 0.4–2)
LDH SERPL L TO P-CCNC: 180 U/L
LEUKOCYTE ESTERASE UR QL STRIP.AUTO: NEGATIVE
LYMPHOCYTES # BLD AUTO: 0.93 X10(3) UL (ref 1–4)
LYMPHOCYTES NFR BLD AUTO: 14.2 %
MCH RBC QN AUTO: 21.8 PG (ref 26–34)
MCHC RBC AUTO-ENTMCNC: 28.1 G/DL (ref 31–37)
MCV RBC AUTO: 77.4 FL
MONOCYTES # BLD AUTO: 0.45 X10(3) UL (ref 0.1–1)
MONOCYTES NFR BLD AUTO: 6.9 %
NEUTROPHILS # BLD AUTO: 4.98 X10 (3) UL (ref 1.5–7.7)
NEUTROPHILS # BLD AUTO: 4.98 X10(3) UL (ref 1.5–7.7)
NEUTROPHILS NFR BLD AUTO: 76.3 %
OSMOLALITY SERPL CALC.SUM OF ELEC: 297 MOSM/KG (ref 275–295)
P AXIS: 80 DEGREES
P-R INTERVAL: 148 MS
PH UR STRIP.AUTO: 5.5 [PH] (ref 5–8)
PLATELET # BLD AUTO: 298 10(3)UL (ref 150–450)
POTASSIUM SERPL-SCNC: 4.4 MMOL/L (ref 3.5–5.1)
PROT SERPL-MCNC: 6.5 G/DL (ref 6.4–8.2)
PROT UR STRIP.AUTO-MCNC: NEGATIVE MG/DL
Q-T INTERVAL: 430 MS
QRS DURATION: 114 MS
QTC CALCULATION (BEZET): 411 MS
R AXIS: -75 DEGREES
RBC # BLD AUTO: 2.57 X10(6)UL
RBC UR QL AUTO: NEGATIVE
RETICS # AUTO: 40.2 X10(3) UL (ref 22.5–147.5)
RETICS/RBC NFR AUTO: 1.5 %
RH BLOOD TYPE: POSITIVE
RSV RNA SPEC NAA+PROBE: NEGATIVE
SARS-COV-2 RNA RESP QL NAA+PROBE: NOT DETECTED
SODIUM SERPL-SCNC: 139 MMOL/L (ref 136–145)
SP GR UR STRIP.AUTO: 1.02 (ref 1–1.03)
T AXIS: 39 DEGREES
TIBC SERPL-MCNC: 468 UG/DL (ref 240–450)
TRANSFERRIN SERPL-MCNC: 314 MG/DL (ref 200–360)
UROBILINOGEN UR STRIP.AUTO-MCNC: NORMAL MG/DL
VENTRICULAR RATE: 55 BPM
WBC # BLD AUTO: 6.5 X10(3) UL (ref 4–11)

## 2024-06-21 PROCEDURE — 30233N1 TRANSFUSION OF NONAUTOLOGOUS RED BLOOD CELLS INTO PERIPHERAL VEIN, PERCUTANEOUS APPROACH: ICD-10-PCS | Performed by: INTERNAL MEDICINE

## 2024-06-21 PROCEDURE — 71045 X-RAY EXAM CHEST 1 VIEW: CPT | Performed by: EMERGENCY MEDICINE

## 2024-06-21 PROCEDURE — 99223 1ST HOSP IP/OBS HIGH 75: CPT | Performed by: INTERNAL MEDICINE

## 2024-06-21 PROCEDURE — 70450 CT HEAD/BRAIN W/O DYE: CPT | Performed by: EMERGENCY MEDICINE

## 2024-06-21 RX ORDER — PANTOPRAZOLE SODIUM 40 MG/1
40 TABLET, DELAYED RELEASE ORAL
Status: DISCONTINUED | OUTPATIENT
Start: 2024-06-22 | End: 2024-06-21

## 2024-06-21 RX ORDER — ATORVASTATIN CALCIUM 80 MG/1
80 TABLET, FILM COATED ORAL NIGHTLY
COMMUNITY

## 2024-06-21 RX ORDER — MELATONIN
3 NIGHTLY PRN
Status: DISCONTINUED | OUTPATIENT
Start: 2024-06-21 | End: 2024-06-24

## 2024-06-21 RX ORDER — HYDROCHLOROTHIAZIDE 25 MG/1
25 TABLET ORAL DAILY
COMMUNITY
End: 2024-06-24

## 2024-06-21 RX ORDER — ACETAMINOPHEN 500 MG
500 TABLET ORAL EVERY 4 HOURS PRN
Status: DISCONTINUED | OUTPATIENT
Start: 2024-06-21 | End: 2024-06-22

## 2024-06-21 RX ORDER — OMEPRAZOLE 20 MG/1
20 CAPSULE, DELAYED RELEASE ORAL DAILY PRN
COMMUNITY

## 2024-06-21 RX ORDER — SENNOSIDES 8.6 MG
17.2 TABLET ORAL NIGHTLY PRN
Status: DISCONTINUED | OUTPATIENT
Start: 2024-06-21 | End: 2024-06-24

## 2024-06-21 RX ORDER — SODIUM CHLORIDE 9 MG/ML
60 INJECTION, SOLUTION INTRAVENOUS CONTINUOUS
Status: DISCONTINUED | OUTPATIENT
Start: 2024-06-21 | End: 2024-06-24

## 2024-06-21 RX ORDER — TAMSULOSIN HYDROCHLORIDE 0.4 MG/1
0.4 CAPSULE ORAL
Status: DISCONTINUED | OUTPATIENT
Start: 2024-06-22 | End: 2024-06-24

## 2024-06-21 RX ORDER — AMLODIPINE BESYLATE 5 MG/1
5 TABLET ORAL DAILY
COMMUNITY
End: 2024-06-24

## 2024-06-21 RX ORDER — METFORMIN HYDROCHLORIDE EXTENDED-RELEASE TABLETS 500 MG/1
500 TABLET, FILM COATED, EXTENDED RELEASE ORAL
COMMUNITY

## 2024-06-21 RX ORDER — GABAPENTIN 100 MG/1
100 CAPSULE ORAL 3 TIMES DAILY
COMMUNITY

## 2024-06-21 RX ORDER — ENEMA 19; 7 G/133ML; G/133ML
1 ENEMA RECTAL ONCE AS NEEDED
Status: DISCONTINUED | OUTPATIENT
Start: 2024-06-21 | End: 2024-06-24

## 2024-06-21 RX ORDER — ONDANSETRON 2 MG/ML
4 INJECTION INTRAMUSCULAR; INTRAVENOUS EVERY 6 HOURS PRN
Status: DISCONTINUED | OUTPATIENT
Start: 2024-06-21 | End: 2024-06-24

## 2024-06-21 RX ORDER — POLYETHYLENE GLYCOL 3350 17 G/17G
17 POWDER, FOR SOLUTION ORAL DAILY PRN
Status: DISCONTINUED | OUTPATIENT
Start: 2024-06-21 | End: 2024-06-24

## 2024-06-21 RX ORDER — FOLIC ACID 1 MG/1
1 TABLET ORAL DAILY
Status: DISCONTINUED | OUTPATIENT
Start: 2024-06-22 | End: 2024-06-24

## 2024-06-21 RX ORDER — PANTOPRAZOLE SODIUM 40 MG/1
40 TABLET, DELAYED RELEASE ORAL
Status: DISCONTINUED | OUTPATIENT
Start: 2024-06-22 | End: 2024-06-24

## 2024-06-21 RX ORDER — BISACODYL 10 MG
10 SUPPOSITORY, RECTAL RECTAL
Status: DISCONTINUED | OUTPATIENT
Start: 2024-06-21 | End: 2024-06-24

## 2024-06-21 NOTE — H&P (VIEW-ONLY)
Gastroenterology Initial Consultation  I have personally seen and examined the patient.    Patient Name: Anya Bautista  Referring physician: Dr. Napoles  Reason for consultation: Anemia  CC: Weakness, fatigue  HPI: This is a 78 yo woman with a history of HTN, PAD and on Plavix, who had been in her usual state of health until the past few weeks when she started to feel a sense of progressive weakness, fatigue, headaches, dizziness and dyspnea on exertion. She reports no nausea/vomiting or abdominal pain.  While she reports no regular blood per rectum, she noted an episode of gross blood in the toilet after a normal bowel movement, about 2 weeks ago. She reports that she presented to the ER because of her weakness and headache, where she was noted to have a hgb of 5.6.  She reports no regular nsaids.  She does not recall having had a prior colonoscopy, but is documented to have undergone a complete colonoscopy and EGD by Dr. AIDAN Hernandez in 11/2019 for symptoms of hematochezia and anemia.    PMHx:   Past Medical History:    Essential hypertension    Hyperlipidemia     PSHx:   Past Surgical History:   Procedure Laterality Date    Colonoscopy N/A 11/14/2019    Procedure: COLONOSCOPY;  Surgeon: Mario Alberto Hernandez MD;  Location:  ENDOSCOPY    Vascular surgery       Medications:    sodium chloride 0.9% infusion  125 mL/hr Intravenous Continuous     Allergies: No Known Allergies  SocHx:  No history of smoking;  The patient drinks alcohol on social occasions; The patient has no history of IV drug use or other illicit substances.  FamHx: The patient has no family history of colon cancer or other gastrointestinal malignancies;  No family history of ulcer disease, or inflammatory bowel disease  ROS:  In addition to the pertinent positives described above:            Infectious Disease:  No chronic infections or recent fevers prior to the acute illness            Cardiovascular: No history of CAD, prior MI, chest pain, or  palpitations            Respiratory: No shortness of breath, asthma, copd, recurrent pneumonia            Hematologic: The patient reports no easy bruising, frequent gum bleeding or nose bleeding;  The patient has no history of known chronic anemia            Dermatologic: The patient reports no recent rashes or chronic skin disorders            Rheumatologic: The patient reports no history of chronic arthritis, myalgias, arthralgias            Genitourinary:  The patient reports no history of recurrent urinary tract infections, hematuria, dysuria, or nephrolithiasis           Psychiatric: The patient reports no history of depression, anxiety, suicidal ideation, or homicidal ideation           Oncologic: The patient reports on history of prior solid tumor or hematologic malignancy           ENT: The patient reports no hoarseness of voice, hearing loss, sinus congestion, tinnitus           Neurologic: The patient reports no history of seizure, stroke, or frequent headaches    PE: /41   Pulse 57   Temp 97.6 °F (36.4 °C)   Resp 24   Ht 5' 5\" (1.651 m)   Wt 150 lb (68 kg)   SpO2 100%   BMI 24.96 kg/m²   Gen: AAO x 3, able to speak in complete sentences  HENT: NCAT, EOMI, PERRL, oropharynx is clear with moist mucosal membranes  Eyes: Sclerae are anicteric  Neck:  Supple without nuchal rigidity  Abdomen: Soft, non-tender, non-distended with the presence of bowel sounds; No hepatosplenomegaly; no rebound or guarding; No ascites is clinically apparent; no tympany to percussion  Ext: No peripheral edema or cyanosis  Skin: Warm and dry  Psychiatric: Appropriate mood and congruent affect without obvious depression or anxiety  Rectal: Heme negative by ER physician    Labs:   Lab Results   Component Value Date    WBC 6.5 06/21/2024    HGB 5.6 06/21/2024    HCT 19.9 06/21/2024    .0 06/21/2024    CREATSERUM 0.90 06/21/2024    BUN 31 06/21/2024     06/21/2024    K 4.4 06/21/2024     06/21/2024     CO2 20.0 06/21/2024     06/21/2024    CA 8.6 06/21/2024    ALB 2.9 06/21/2024    ALKPHO 102 06/21/2024    BILT 0.2 06/21/2024    AST 12 06/21/2024    ALT 15 06/21/2024     Recent Labs   Lab 06/21/24  1134   *   BUN 31*   CREATSERUM 0.90   CA 8.6      K 4.4      CO2 20.0*     Recent Labs   Lab 06/21/24  1134   RBC 2.57*   HGB 5.6*   HCT 19.9*   MCV 77.4*   MCH 21.8*   MCHC 28.1*   RDW 20.4   NEPRELIM 4.98   WBC 6.5   .0       Recent Labs   Lab 06/21/24  1134   ALT 15   AST 12*             Operative Report-Colonoscopy     PREOPERATIVE DIAGNOSIS/INDICATION: Anemia, hematochezia     POSTOPERTATIVE DIAGNOSIS: Internal hemorrhoids, external hemorrhoids, cecal ulceration, colon polyps, diverticulosis      PROCEDURE PERFORMED: COLONOSCOPY     INFORMED CONSENT:  Once a brief history and physical examination was performed, the risks, benefits and alternatives to the procedure were discussed with the patient and/or family and informed consent was obtained. The risks of sedation, perforation, missed lesions and need for surgery were all discussed.  Patient expressed understanding of the risks and agreed to proceed.     PROCEDURE DESCRIPTION:The patient was then brought to the endoscopy suite where his pulse, pulse oximetry and blood pressure were monitored. He was placed in the left lateral decubitus position and deep sedation was administered. Once adequate sedation was achieved, a rectal exam was performed which was normal. A lubricated tip of an Olympus video colonoscope was then inserted through the rectum and gently manipulated under direct visualization to the cecal pole and the terminal ileum. The quality of the preparation was fair. Upon withdrawal of the colonoscope, careful visualization of the mucosa was performed.      [place] Prep Score: Right Colon 2Transverse colon 3 Left colon 1     FINDINGS/THERAPEUTICS:     TERMINAL ILEUM: Normal terminal ileum     COLON: Pan colonic  diverticulosis;   4 mm cecal ulcer seen that was bleeding on contact. This was not biopsied as pt on DAPT   A 3 mm colon polyp was seen in the transverse colon. Not resected   2 mm colon polyp seen in sigmoid colon. Not resected.      RECTUM: Large internal hemorrhoids and external hemorrhoids seen.     RECOMMENDATIONS:      Post Colonoscopy/polypectomy precautions, watch for bleeding, infection, perforation, adverse drug reactions      Hemorrhoids in the setting of DAPT likely cause of hematochezia     Repeat colonoscopy as an outpatient can be considered for polypectomy.     GI will sign off at this time. Please call back with any questions or concerns.         Mario Alberto Hernandez  11/14/2019  2:08 PM                                   Operative Report-Esophagogastroduodenoscopy        PREOPERATIVE DIAGNOSIS/INDICATION: Hematochezia, anemia     POSTOPERTATIVE DIAGNOSIS: Irregular Z line, paraesophageal hernia, pyloric erosions      PROCEDURE PERFORMED: EGD     INFORMED CONSENT: Once a brief history and physical examination was performed, the risks, benefits and alternatives to the procedure were discussed with the patient and/or family and informed consent was obtained.  The risks of sedation, perforation, missed lesions and need for surgery were all discussed.  Patient expressed understanding of the risks and agreed to proceed.        PROCEDURE DESCRIPTION:  The patient was then brought to the endoscopy suite where his/her pulse, pulse oximetry and blood pressure were monitored. He/she was placed in the left lateral decubitus position and deep sedation was administered. Once adequate sedation was achieved, a bite block was placed and a lubricated tip of an Olympus video upper endoscope was inserted through the oropharynx and gently manipulated through the esophagus into the stomach and the distal duodenum. Upon withdrawal of the endoscope, careful visualization of the mucosa was performed.      FINDINGS:      ESOPHAGUS: Normal esophagus      EGJ: Irregular Z line at 37 cm. This was not biopsied given dual antiplatelet therapy.      STOMACH: Pyloric erosions with no evidence of active bleeding.      DUODENUM: Normal duodenum to examined extent.      THERAPEUTICS: None     RECOMMENDATIONS:      Post EGD precautions, watch for bleeding, infection, perforation, adverse drug reactions      Follow biopsies.     Pantoprazole 40 mg qday; will obtain H. Pylori stool Ag and tx if positive     Repeat EGD in to assess Z line with biopsies in the future once off CECILY Hernandez  11/14/2019  1:44 PM            Electronically signed by Mario Alberto Hernandez MD at 11/14/2019  2:19 PM    Impression: This is a 78 yo woman who is presenting with regard to progressive symptomatic anemia over the past few weeks.  Her baseline hgb tends to be 8-9, but she has been noted to drop down to 6 in the past.  She has undergone prior EGD and Colonoscopy.  She was noted to have some small polyps at that time, which were not resected due to being on anti-platelet therapy at the time.  It is unlikely that these small findings are now causing her current symptoms.  However, because of her recurrent anemia, she warrants a repeat evaluation.     Recommendations:   1) PRBC's to achieve Hgb > 7  2) Pantoprazole 40 mg po daily   3) Regular diet  4) Once adequately volume resuscitated, would pursue EGD and Colonoscopy  5) Would consult Cardiology to discuss holding Plavix

## 2024-06-21 NOTE — PROGRESS NOTES
NURSING ADMISSION NOTE      Patient admitted via Cart  Oriented to room.  Safety precautions initiated.  Bed in low position.  Call light in reach.    Patient is A+Ox4, Tajik speaking. Maintaining sats >90% on RA. NSR on tele. Purewick + briefed. Up 1 w/ walker. NPO. Awaiting GI consult. Awaiting Hgb redraw. 1/2 PRBCs given in ED. Will redraw now on unit, await 2nd unit.     Hgb redraw 6.7, MD notified, will give the remaining 1 unit of PRBC's.     Patient and family at bedside updated on POC.     Patient's daughter to bring in patient's home medications for PTA med list.

## 2024-06-21 NOTE — CONSULTS
Gastroenterology Initial Consultation  I have personally seen and examined the patient.    Patient Name: Anya Bautista  Referring physician: Dr. Napoles  Reason for consultation: Anemia  CC: Weakness, fatigue  HPI: This is a 80 yo woman with a history of HTN, PAD and on Plavix, who had been in her usual state of health until the past few weeks when she started to feel a sense of progressive weakness, fatigue, headaches, dizziness and dyspnea on exertion. She reports no nausea/vomiting or abdominal pain.  While she reports no regular blood per rectum, she noted an episode of gross blood in the toilet after a normal bowel movement, about 2 weeks ago. She reports that she presented to the ER because of her weakness and headache, where she was noted to have a hgb of 5.6.  She reports no regular nsaids.  She does not recall having had a prior colonoscopy, but is documented to have undergone a complete colonoscopy and EGD by Dr. AIDAN Hernandez in 11/2019 for symptoms of hematochezia and anemia.    PMHx:   Past Medical History:    Essential hypertension    Hyperlipidemia     PSHx:   Past Surgical History:   Procedure Laterality Date    Colonoscopy N/A 11/14/2019    Procedure: COLONOSCOPY;  Surgeon: Mario Alberto Hernandez MD;  Location:  ENDOSCOPY    Vascular surgery       Medications:    sodium chloride 0.9% infusion  125 mL/hr Intravenous Continuous     Allergies: No Known Allergies  SocHx:  No history of smoking;  The patient drinks alcohol on social occasions; The patient has no history of IV drug use or other illicit substances.  FamHx: The patient has no family history of colon cancer or other gastrointestinal malignancies;  No family history of ulcer disease, or inflammatory bowel disease  ROS:  In addition to the pertinent positives described above:            Infectious Disease:  No chronic infections or recent fevers prior to the acute illness            Cardiovascular: No history of CAD, prior MI, chest pain, or  palpitations            Respiratory: No shortness of breath, asthma, copd, recurrent pneumonia            Hematologic: The patient reports no easy bruising, frequent gum bleeding or nose bleeding;  The patient has no history of known chronic anemia            Dermatologic: The patient reports no recent rashes or chronic skin disorders            Rheumatologic: The patient reports no history of chronic arthritis, myalgias, arthralgias            Genitourinary:  The patient reports no history of recurrent urinary tract infections, hematuria, dysuria, or nephrolithiasis           Psychiatric: The patient reports no history of depression, anxiety, suicidal ideation, or homicidal ideation           Oncologic: The patient reports on history of prior solid tumor or hematologic malignancy           ENT: The patient reports no hoarseness of voice, hearing loss, sinus congestion, tinnitus           Neurologic: The patient reports no history of seizure, stroke, or frequent headaches    PE: /41   Pulse 57   Temp 97.6 °F (36.4 °C)   Resp 24   Ht 5' 5\" (1.651 m)   Wt 150 lb (68 kg)   SpO2 100%   BMI 24.96 kg/m²   Gen: AAO x 3, able to speak in complete sentences  HENT: NCAT, EOMI, PERRL, oropharynx is clear with moist mucosal membranes  Eyes: Sclerae are anicteric  Neck:  Supple without nuchal rigidity  Abdomen: Soft, non-tender, non-distended with the presence of bowel sounds; No hepatosplenomegaly; no rebound or guarding; No ascites is clinically apparent; no tympany to percussion  Ext: No peripheral edema or cyanosis  Skin: Warm and dry  Psychiatric: Appropriate mood and congruent affect without obvious depression or anxiety  Rectal: Heme negative by ER physician    Labs:   Lab Results   Component Value Date    WBC 6.5 06/21/2024    HGB 5.6 06/21/2024    HCT 19.9 06/21/2024    .0 06/21/2024    CREATSERUM 0.90 06/21/2024    BUN 31 06/21/2024     06/21/2024    K 4.4 06/21/2024     06/21/2024     CO2 20.0 06/21/2024     06/21/2024    CA 8.6 06/21/2024    ALB 2.9 06/21/2024    ALKPHO 102 06/21/2024    BILT 0.2 06/21/2024    AST 12 06/21/2024    ALT 15 06/21/2024     Recent Labs   Lab 06/21/24  1134   *   BUN 31*   CREATSERUM 0.90   CA 8.6      K 4.4      CO2 20.0*     Recent Labs   Lab 06/21/24  1134   RBC 2.57*   HGB 5.6*   HCT 19.9*   MCV 77.4*   MCH 21.8*   MCHC 28.1*   RDW 20.4   NEPRELIM 4.98   WBC 6.5   .0       Recent Labs   Lab 06/21/24  1134   ALT 15   AST 12*             Operative Report-Colonoscopy     PREOPERATIVE DIAGNOSIS/INDICATION: Anemia, hematochezia     POSTOPERTATIVE DIAGNOSIS: Internal hemorrhoids, external hemorrhoids, cecal ulceration, colon polyps, diverticulosis      PROCEDURE PERFORMED: COLONOSCOPY     INFORMED CONSENT:  Once a brief history and physical examination was performed, the risks, benefits and alternatives to the procedure were discussed with the patient and/or family and informed consent was obtained. The risks of sedation, perforation, missed lesions and need for surgery were all discussed.  Patient expressed understanding of the risks and agreed to proceed.     PROCEDURE DESCRIPTION:The patient was then brought to the endoscopy suite where his pulse, pulse oximetry and blood pressure were monitored. He was placed in the left lateral decubitus position and deep sedation was administered. Once adequate sedation was achieved, a rectal exam was performed which was normal. A lubricated tip of an Olympus video colonoscope was then inserted through the rectum and gently manipulated under direct visualization to the cecal pole and the terminal ileum. The quality of the preparation was fair. Upon withdrawal of the colonoscope, careful visualization of the mucosa was performed.      [place] Prep Score: Right Colon 2Transverse colon 3 Left colon 1     FINDINGS/THERAPEUTICS:     TERMINAL ILEUM: Normal terminal ileum     COLON: Pan colonic  diverticulosis;   4 mm cecal ulcer seen that was bleeding on contact. This was not biopsied as pt on DAPT   A 3 mm colon polyp was seen in the transverse colon. Not resected   2 mm colon polyp seen in sigmoid colon. Not resected.      RECTUM: Large internal hemorrhoids and external hemorrhoids seen.     RECOMMENDATIONS:      Post Colonoscopy/polypectomy precautions, watch for bleeding, infection, perforation, adverse drug reactions      Hemorrhoids in the setting of DAPT likely cause of hematochezia     Repeat colonoscopy as an outpatient can be considered for polypectomy.     GI will sign off at this time. Please call back with any questions or concerns.         Mario Alberto Hernandez  11/14/2019  2:08 PM                                   Operative Report-Esophagogastroduodenoscopy        PREOPERATIVE DIAGNOSIS/INDICATION: Hematochezia, anemia     POSTOPERTATIVE DIAGNOSIS: Irregular Z line, paraesophageal hernia, pyloric erosions      PROCEDURE PERFORMED: EGD     INFORMED CONSENT: Once a brief history and physical examination was performed, the risks, benefits and alternatives to the procedure were discussed with the patient and/or family and informed consent was obtained.  The risks of sedation, perforation, missed lesions and need for surgery were all discussed.  Patient expressed understanding of the risks and agreed to proceed.        PROCEDURE DESCRIPTION:  The patient was then brought to the endoscopy suite where his/her pulse, pulse oximetry and blood pressure were monitored. He/she was placed in the left lateral decubitus position and deep sedation was administered. Once adequate sedation was achieved, a bite block was placed and a lubricated tip of an Olympus video upper endoscope was inserted through the oropharynx and gently manipulated through the esophagus into the stomach and the distal duodenum. Upon withdrawal of the endoscope, careful visualization of the mucosa was performed.      FINDINGS:      ESOPHAGUS: Normal esophagus      EGJ: Irregular Z line at 37 cm. This was not biopsied given dual antiplatelet therapy.      STOMACH: Pyloric erosions with no evidence of active bleeding.      DUODENUM: Normal duodenum to examined extent.      THERAPEUTICS: None     RECOMMENDATIONS:      Post EGD precautions, watch for bleeding, infection, perforation, adverse drug reactions      Follow biopsies.     Pantoprazole 40 mg qday; will obtain H. Pylori stool Ag and tx if positive     Repeat EGD in to assess Z line with biopsies in the future once off CECIYL Hernandez  11/14/2019  1:44 PM            Electronically signed by Mario Alberto Hernandez MD at 11/14/2019  2:19 PM    Impression: This is a 78 yo woman who is presenting with regard to progressive symptomatic anemia over the past few weeks.  Her baseline hgb tends to be 8-9, but she has been noted to drop down to 6 in the past.  She has undergone prior EGD and Colonoscopy.  She was noted to have some small polyps at that time, which were not resected due to being on anti-platelet therapy at the time.  It is unlikely that these small findings are now causing her current symptoms.  However, because of her recurrent anemia, she warrants a repeat evaluation.     Recommendations:   1) PRBC's to achieve Hgb > 7  2) Pantoprazole 40 mg po daily   3) Regular diet  4) Once adequately volume resuscitated, would pursue EGD and Colonoscopy  5) Would consult Cardiology to discuss holding Plavix

## 2024-06-21 NOTE — CONSULTS
[facility]  Report of Consultation    Anya Bautista Patient Status:  Inpatient    1945 MRN [id number]   Location [facility]NW[facility]A Attending Jacy Peoples, DO   Hosp Day # 0 PCP DIAN Hernandez     Reason for Consultation:  Profound anemia hemoglobin 5.6, symptomatic  On ???dual antiplatelet therapy for history of peripheral angioplasty      History of Present Illness:  Anya Bautista is a a(n) 79 year old female, known to my colleague Dr. Jeyson Kessler with peripheral vascular disease and history of peripheral procedures for lower extremity wounds with cellulitis 2019, it is not clear if on dual antiplatelet therapy for history of PTA, brought by family to ER with headache and dizziness and generalized weakness with body aches over last 2 weeks.  No fever.  No recent respiratory infection.  No chest pain.  No chest symptoms in general more body aches.  No GI symptoms.  No abdominal pain.  No nausea vomiting or diarrhea.    Hemoglobin 5.6 in the emergency room.    Patient on Plavix and aspirin and statin for history of peripheral angioplasties with Dr. Kessler.    Upon questioning worsening generalized weakness with dizziness and anemia are main reason for presentation.    In ER she was found to have profound anemia with hemoglobin but denies bloody stool or blood in the urine.    I called daughter and she help with translation Urdu English -but was not clear if patient takes Plavix and aspirin.  Somebody is bringing medication bottles.    She has history of anemia requiring blood transfusion in the past.  Last GI scopes were unremarkable with no active bleeding per family in 2019.    Patient is MGUS history and chronic kidney disease with history of iron deficiency.    Important labs: Hemoglobin 5.6 glucose 146 creatinine 0.9 potassium 4.4 sodium 139 platelets 298.  Normal liver enzymes.    Chest x-ray with no acute acute cardiopulmonary changes.    Brain CT with no  acute changes.    Telemetry: Stable sinus.    Nuclear stress test -no ischemia with LVEF of 66% in November 2019.    Echo Doppler -November 2019 - LVEF 65 to 70% with no wall motion abnormalities and slow relaxation with mild stenosis of aortic valve with mean gradient of 18 mmHg and calcified mitral valve annulus with 1+ MR and mildly enlarged both atria.  No LVH.    EKG: Sinus 55 bpm with right bundle branch block and left anterior fascicular block with secondary nonspecific changes but no acute ischemia.      History: Peripheral arterial disease.  Past Medical History:    Essential hypertension    High blood pressure    History of blood transfusion    Hyperlipidemia     Past Surgical History:   Procedure Laterality Date    Colonoscopy N/A 11/14/2019    Procedure: COLONOSCOPY;  Surgeon: Mario Alberto Hernandez MD;  Location:  ENDOSCOPY    Vascular surgery     Peripheral angioplasties.    Family history: Cardiovascular disease.    Social History:   reports that she has never smoked. She has never used smokeless tobacco.    Allergies:  No Known Allergies    Medications:    Current Facility-Administered Medications:     sodium chloride 0.9% infusion, 125 mL/hr, Intravenous, Continuous    [START ON 6/22/2024] tamsulosin (Flomax) cap 0.4 mg, 0.4 mg, Oral, Daily @ 0700    melatonin tab 3 mg, 3 mg, Oral, Nightly PRN    ondansetron (Zofran) 4 MG/2ML injection 4 mg, 4 mg, Intravenous, Q6H PRN    acetaminophen (Tylenol Extra Strength) tab 500 mg, 500 mg, Oral, Q4H PRN    polyethylene glycol (PEG 3350) (Miralax) 17 g oral packet 17 g, 17 g, Oral, Daily PRN    sennosides (Senokot) tab 17.2 mg, 17.2 mg, Oral, Nightly PRN    bisacodyl (Dulcolax) 10 MG rectal suppository 10 mg, 10 mg, Rectal, Daily PRN    fleet enema (Fleet) 7-19 GM/118ML rectal enema 133 mL, 1 enema, Rectal, Once PRN    pantoprazole (Protonix) 40 mg in sodium chloride 0.9% PF 10 mL IV push, 40 mg, Intravenous, Q12H    sodium ferric gluconate (Ferrlecit) 125 mg in  sodium chloride 0.9% 100mL IVPB premix, 125 mg, Intravenous, Daily    folic acid (Folvite) tab 1 mg, 1 mg, Oral, Daily    [START ON 2024] pantoprazole (Protonix) DR tab 40 mg, 40 mg, Oral, QAM AC    Review of Systems:     Constitutional: Negative for fever or chills. No significant weight changes.  Eyes: Patient denies significant visual changes.  Ears, Nose, Mouth, Throat:  Negative for any new hearing loss. No sore throat. No nasal congestion.  Cardiovascular: see HPI -Arterial disease with history of PTA for lower extremity wounds and cellulitis.  Respiratory: No cough, wheezing or hemoptysis, no dyspnea.  Hematologic/Lymphatic: Chronic anemia multifactorial.  Integumentary: No rash or suspicious lesions on the skin.   Musculoskeletal: Arthritis.  Generalized weakness.  Gastrointestinal: Negative for any bleeding. No abdominal pain. No diarrhea.  Genitourinary: No hematuria.   Neurological: Alert and oriented, no headaches, no focal weakness or new paresthesias.  Allergic/Immunologic: no rhinitis or environmental allergies      Physical Exam:  Blood pressure 110/54, pulse 57, temperature 97.9 °F (36.6 °C), temperature source Oral, resp. rate 22, height 65\", weight 150 lb (68 kg), SpO2 99%.  Temp (24hrs), Av.6 °F (36.4 °C), Min:97.2 °F (36.2 °C), Max:97.9 °F (36.6 °C)    Wt Readings from Last 3 Encounters:   24 150 lb (68 kg)   19 134 lb 4.8 oz (60.9 kg)       Constitutional: Normal appearance. No apparent distress.  Eyes: Moist conjunctivae, PERRLA.  Ears, Nose, Mouth, Throat:  Moist mucosa. Anicteric sclera. Oropharynx clear.  Head and Neck: No JVD, carotids 2+ no bruits. Neck supple. No thyromegaly or adenopathy. Normocephalic head.  Cardiovascular: Regular rate and rhythm, S1, S2 normal, 2 out of 6 systolic murmur, no rub or gallop.  Respiratory: Clear lungs without wheezes, rales, rhonchi or dullness.  Normal excursions and effort.  Gastrointestinal: Soft, non-tender abdomen.    Extremities: Without clubbing, cyanosis or edema.  Peripheral pulses- weak palpable pulses.  Neurologic: Alert and oriented x3.  Cranial nerves intact. Normal sensation and motor function. No focal deficits.  Musculoskeletal: Generalized weakness.  Patient uses walker for ambulation.  Integumentary: Healed prior wounds from PAD.  Psychiatric: Depression and anxiety.    Laboratories and Data:    Imaging:  CT BRAIN OR HEAD ([id number])    Result Date: 6/21/2024  PROCEDURE:  CT BRAIN OR HEAD ([id number])  COMPARISON:  None.  INDICATIONS:  headache , numbness since 2 weeks  TECHNIQUE:  Noncontrast CT scanning is performed through the brain. Dose reduction techniques were used. Dose information is transmitted to the ACR (American College of Radiology) NRDR (National Radiology Data Registry) which includes the Dose Index Registry.  PATIENT STATED HISTORY: (As transcribed by Technologist)  Patient is here with headache, dizziness and weakness    FINDINGS:  Ventricles and sulci are within normal limits.  Mild age indeterminate small vessel ischemic disease.  Scattered coarse calcification/mineralization noted.  There is a coarse calcification at the left temporal occipital junction.  There is no midline shift or mass-effect.  The basal cisterns are patent.  The gray-white matter differentiation is intact.  There is no acute intracranial hemorrhage or extra-axial fluid collection.   There is no evident fracture.  The visualized paranasal sinuses and mastoid air cells are unremarkable.            CONCLUSION:  1. No acute intracranial hemorrhage or hydrocephalus. 2. Mild age indeterminate small vessel ischemic disease. If there is clinical concern for acute ischemia/infarction, an MRI of the brain would be recommended for further evaluation. 3. Coarse calcification at the left temporal occipital junction is nonspecific but could represent chronic neurocysticercosis among other etiologies.      XR CHEST AP PORTABLE   (CPT=71045)    Result Date: 6/21/2024  PROCEDURE:  XR CHEST AP PORTABLE  (CPT=71045)  TECHNIQUE:  AP chest radiograph was obtained.  COMPARISON:  None.  INDICATIONS:  headache , numbness since 2 weeks  PATIENT STATED HISTORY: (As transcribed by Technologist)  Patient stated having a headache for the past 2 weeks.    FINDINGS:  Patient rotated to right.  Magnified heart.  Normal pulmonary vascularity.  Calcified aorta.  No focal pulmonary opacity.            CONCLUSION:  No evidence of active cardiopulmonary disease.         Labs:     Lab Results   Component Value Date    INR 1.09 11/14/2019        Lab Results   Component Value Date    WBC 6.5 06/21/2024    HGB 6.7 06/21/2024    HCT 19.9 06/21/2024    .0 06/21/2024    CREATSERUM 0.90 06/21/2024    BUN 31 06/21/2024     06/21/2024    K 4.4 06/21/2024     06/21/2024    CO2 20.0 06/21/2024     06/21/2024    CA 8.6 06/21/2024    ALB 2.9 06/21/2024    ALKPHO 102 06/21/2024    BILT 0.2 06/21/2024    TP 6.5 06/21/2024    AST 12 06/21/2024    ALT 15 06/21/2024     Impression:  Symptomatic anemia with hemoglobin 5.6 in the emergency room.  No acute GI symptoms.  No obvious blood in stool or urine.  Patient's family.  Cardiac clearance prior to the scope and regarding dual antiplatelet therapy management  Worsening generalized weakness with dizziness and lightheadedness due to symptomatic anemia.  Peripheral arterial disease -on Plavix and aspirin 81 mg p.o. daily.  Chronic anemia with worsening hemoglobin -peripheral etiologies of anemia MGUS with chronic kidney disease and iron deficiency in the past -last upper and lower scopes 2019 but no bleeders.  Systemic hypertension controlled with beta-blocker and lisinopril but anemia also lower the pressure.  Mild aortic stenosis.  Dyslipidemia on statin.  GERD.  History of lower extremity wounds due to peripheral arterial disease 2019.  Physical deconditioning.    Plan:  -Telemetry monitoring  -Hold  Plavix and aspirin from home dose no recent procedures -which she was even taking these medications prior to admission -it is not clear at present time  -Echo Doppler to follow and mild aortic stenosis after 5 years and assess LV systolic function for fluid management -last echo with normal heart pump function 2019 and nuclear stress test was negative for ischemia at the time.  -No need for any stress test -no angina and this is admission with symptomatic anemia  -For this reason patient is cleared for GI scope  -We may resume 1 or 2 antiplatelet agents after scope depends on findings-no urgency with going back on antiplatelet agent -GI workup first  -Follow hemoglobin after blood transfusion  -Iron status -expecting low iron -normal platelet count  -Agree with gentle IV fluid hydration but not vigorous -to avoid iatrogenic fluid overload especially with mild aortic stenosis known from the past -will lower IV fluids by 50% especially with blood products-there is no CHF evaluation and avoid iatrogenic fluid overload -cut fluids from 125 cc/h down to 60 cc/h  -I called daughter and she helped with translation [place] English -but was not clear if patient takes Plavix and aspirin.  Somebody is bringing medication bottles -our recommendation is if patient did not take aspirin and / or Plavix prior to admission there will be nothing to hold but if she has been taking dual antiplatelet therapy prior to admission it can be easily held with no recent cardiovascular procedures since 2019    Discussed with the patient's family helping with [place] English translation    Evert Blackwell M.D.  [facility]    6/21/2024  6:05 PM  C5

## 2024-06-21 NOTE — ED QUICK NOTES
Rounding Completed  Plan of Care reviewed. Waiting for admit bed to be ready. Elimination needs assessed.  Family at bedside. No concerns at this time.   Bed is locked and in lowest position. Call light within reach.

## 2024-06-21 NOTE — ED PROVIDER NOTES
Patient Seen in: [facility] Emergency Department      History     Chief Complaint   Patient presents with    Headache    Numbness Weakness     Stated Complaint: headache , numbness since 2 weeks    Subjective:   HPI    79-year-old male comes to the hospital complaint of having difficulty with progressive weakness over the past 2 weeks.  She will get occasional headaches.  She denies any loss of sensation at this time.  Her weakness is in general.  She is having bodyaches at times headaches as well.  She is denying any known fevers.  She is having no nausea or vomiting at this time.  Denies any diarrhea.  She.  She denying any urinary symptoms.  She has no runny nose, sneeze or cough.  She denies any abdominal pains.  She is denying any other complaints.  She does have a history of having diabetes, heart disease and anemia in the past.    Objective:   Past Medical History:    Essential hypertension    Hyperlipidemia              No pertinent past surgical history.              No pertinent social history.            Review of Systems    Positive for stated complaint: headache , numbness since 2 weeks  Other systems are as noted in HPI.  Constitutional and vital signs reviewed.      All other systems reviewed and negative except as noted above.    Physical Exam     ED Triage Vitals   BP 06/21/24 1115 92/32   Pulse 06/21/24 1115 56   Resp 06/21/24 1115 (!) 9   Temp 06/21/24 1126 97.2 °F (36.2 °C)   Temp src --    SpO2 06/21/24 1115 100 %   O2 Device 06/21/24 1126 None (Room air)       Current Vitals:   Vital Signs  BP: 118/41  Pulse: 57  Resp: 24  Temp: 97.6 °F (36.4 °C)  MAP (mmHg): (!) 63    Oxygen Therapy  SpO2: 100 %  O2 Device: None (Room air)            Physical Exam    HEENT : NCAT, EOMI, PEERL,  neck supple, no JVD, trachea midline, No LAD  Heart: S1S2 normal.  Systolic ejection murmur is noted, regular rate and rhythm  Lungs: Clear to auscultation bilaterally  Abdomen: Soft nontender nondistended normal  active bowel sounds without rebound, guarding or masses noted  Back nontender without CVA tenderness  Extremity no clubbing, cyanosis or edema noted.  Full range of motion noted without tenderness  Neuro: No focal deficits noted  Rectal exam is heme-negative.  All measures to prevent infection transmission during my interaction with the patient were taken.  The patient was already wearing droplet mask on my arrival to the room.  Personal protective equipment including a droplet mask as well as gloves were worn throughout the duration of my exam.  Hand washing was performed prior to and after the exam.  Stethoscope and equipment used during my examination was cleaned with a super Sani cloth germicidal wipe following the exam.    ED Course     Labs Reviewed   COMP METABOLIC PANEL (14) - Abnormal; Notable for the following components:       Result Value    Glucose 146 (*)     CO2 20.0 (*)     BUN 31 (*)     Calculated Osmolality 297 (*)     AST 12 (*)     Albumin 2.9 (*)     A/G Ratio 0.8 (*)     All other components within normal limits   URINALYSIS WITH CULTURE REFLEX - Abnormal; Notable for the following components:    Nitrite Urine 2+ (*)     Squamous Epi. Cells Few (*)     All other components within normal limits   CBC W/ DIFFERENTIAL - Abnormal; Notable for the following components:    RBC 2.57 (*)     HGB 5.6 (*)     HCT 19.9 (*)     MCV 77.4 (*)     MCH 21.8 (*)     MCHC 28.1 (*)     Lymphocyte Absolute 0.93 (*)     All other components within normal limits   LACTIC ACID, PLASMA - Normal   SARS-COV-2/FLU A AND B/RSV BY PCR (GENEXPERT) - Normal    Narrative:     This test is intended for the qualitative detection and differentiation of SARS-CoV-2, influenza A, influenza B, and respiratory syncytial virus (RSV) viral RNA in nasopharyngeal or nares swabs from individuals suspected of respiratory viral infection consistent with COVID-19 by their healthcare provider. Signs and symptoms of respiratory viral  infection due to SARS-CoV-2, influenza, and RSV can be similar.    Test performed using the Xpert Xpress SARS-CoV-2/FLU/RSV (real time RT-PCR)  assay on the [employer] instrument, [employer], [place] [address].   This test is being used under the Food and Drug Administration's Emergency Use Authorization.    The authorized Fact Sheet for Healthcare Providers for this assay is available upon request from the laboratory.   CBC WITH DIFFERENTIAL WITH PLATELET    Narrative:     The following orders were created for panel order CBC With Differential With Platelet.  Procedure                               Abnormality         Status                     ---------                               -----------         ------                     CBC W/ DIFFERENTIAL[[id number]]          Abnormal            Final result                 Please view results for these tests on the individual orders.   IRON AND TIBC   LDH   HAPTOGLOBIN   RETICULOCYTE COUNT   TYPE AND SCREEN    Narrative:     The following orders were created for panel order Type and screen.  Procedure                               Abnormality         Status                     ---------                               -----------         ------                     ABORH (Blood Type)[[id number]]                               Final result               Antibody Screen[[id number]]                                  Final result                 Please view results for these tests on the individual orders.   ABORH (BLOOD TYPE)   ANTIBODY SCREEN   PREPARE RBC   RAINBOW DRAW GOLD   RAINBOW DRAW BLUE   BLOOD CULTURE   BLOOD CULTURE   URINE CULTURE, ROUTINE     EKG    Rate, intervals and axes as noted on EKG Report.  Rate: 55  Rhythm: Sinus Rhythm  Reading: , , patient sinus bradycardia with right bundle branch block noted with 11 to her fascicular block.              ED Course as of 06/21/24 1451  ------------------------------------------------------------  Time: 06/21  1444  Comment: While here the patient's urinalysis was normal.  The patient's white count was 6.5 and hemoglobin of 5.6.  Patient CO2 was 20 and BUN was 31.  Patient's COVID, influenza and RSV were negative.  The patient had chest x-ray that upper-central which showed no acute cardiopulmonary process.  Read the radiology report as well per the patient CT brain reviewed as well.  Patient had blood products ordered and patient be admitted to the hospital with GI notified.     CT BRAIN OR HEAD ([id number])    Result Date: 6/21/2024  PROCEDURE:  CT BRAIN OR HEAD ([id number])  COMPARISON:  None.  INDICATIONS:  headache , numbness since 2 weeks  TECHNIQUE:  Noncontrast CT scanning is performed through the brain. Dose reduction techniques were used. Dose information is transmitted to the ACR (American College of Radiology) NRDR (National Radiology Data Registry) which includes the Dose Index Registry.  PATIENT STATED HISTORY: (As transcribed by Technologist)  Patient is here with headache, dizziness and weakness    FINDINGS:  Ventricles and sulci are within normal limits.  Mild age indeterminate small vessel ischemic disease.  Scattered coarse calcification/mineralization noted.  There is a coarse calcification at the left temporal occipital junction.  There is no midline shift or mass-effect.  The basal cisterns are patent.  The gray-white matter differentiation is intact.  There is no acute intracranial hemorrhage or extra-axial fluid collection.   There is no evident fracture.  The visualized paranasal sinuses and mastoid air cells are unremarkable.            CONCLUSION:  1. No acute intracranial hemorrhage or hydrocephalus. 2. Mild age indeterminate small vessel ischemic disease. If there is clinical concern for acute ischemia/infarction, an MRI of the brain would be recommended for further evaluation. 3. Coarse calcification at the left temporal occipital junction is nonspecific but could represent chronic neurocysticercosis  among other etiologies.    LOCATION:  [id number]   Dictated by (CST): Stromberg, LeRoy, MD on 6/21/2024 at 1:24 PM     Finalized by (CST): Stromberg, LeRoy, MD on 6/21/2024 at 1:27 PM       XR CHEST AP PORTABLE  (CPT=71045)    Result Date: 6/21/2024  PROCEDURE:  XR CHEST AP PORTABLE  (CPT=71045)  TECHNIQUE:  AP chest radiograph was obtained.  COMPARISON:  None.  INDICATIONS:  headache , numbness since 2 weeks  PATIENT STATED HISTORY: (As transcribed by Technologist)  Patient stated having a headache for the past 2 weeks.    FINDINGS:  Patient rotated to right.  Magnified heart.  Normal pulmonary vascularity.  Calcified aorta.  No focal pulmonary opacity.            CONCLUSION:  No evidence of active cardiopulmonary disease.   LOCATION:  [id number]      Dictated by (CST): Alejandro Daley MD on 6/21/2024 at 12:57 PM     Finalized by (CST): Alejandro Daley MD on 6/21/2024 at 12:57 PM        Medications   sodium chloride 0.9% infusion (125 mL/hr Intravenous Handoff 6/21/24 1433)              MDM      Differential diagnosis does include GI bleed, iron deficient anemia, intracranial disorder but not limited these.  I believe the patient does have's symptomatic anemia.  She is heme-negative when her stool was checked.  I spoke with the hospitalist as well as GI the patient be admitted for further management.  Patient is receiving blood products while here.      Critical Care Note:  The patient arrived with a condition with significant morbidity and mortality associated. The services I provided  were to promote improvement and reduce mortality specifically involving complex record review, complex medical decisions and interventions, and consultations outside the regular procedures and care normally rendered for 45 minutes of critical care time    This note was prepared using Dragon Medical voice recognition dictation software.  As a result errors may occur.  When identified to these areas have been corrected.  While every attempt is  made to correct errors during dictation discrepancies may still exist.  Please contact if there are any errors.  Admission disposition: 6/21/2024  2:51 PM                                        Medical Decision Making      Disposition and Plan     Clinical Impression:  1. Weakness generalized    2. Symptomatic anemia         Disposition:  Admit  6/21/2024  2:51 pm    Follow-up:  No follow-up provider specified.        Medications Prescribed:  Current Discharge Medication List                            Hospital Problems       Present on Admission  Date Reviewed: 1/2/2020            ICD-10-CM Noted POA    * (Principal) Weakness generalized R53.1 6/21/2024 Unknown

## 2024-06-21 NOTE — H&P
[facility]     Anya Bautista Patient Status:  Emergency    1945 MRN [id number]   Location [facility] EMERGENCY DEPARTMENT Attending Mynor Napoles MD   Hosp Day # 0 PCP DIAN Hernandez     Chief Complaint: generalized weakness    Subjective:    History of Present Illness:     Anya Bautista is an 79 year old female with pmhx of HTN, HLD, anemia who presents with complaint of progressive generalized weakness and fatigue. She and her family at bedside report that she has been having ongoing lightheadedness/dizziness, tingling in face, BL/UE over the last few weeks associated with generalized weakness, fatigue. No generalized abdominal pain (although, she thinks she may have some very mild LLQ pain over last several days), or  dark colored stools, n/v/d, fevers/chills, melena/hematochezia, hematemesis, easy bruising, or rashes. She has history of iron deficiency anemia and has required iron infusion and blood transfusions in the past. They report that she had endsocopy done a few years ago in similar situation and report \"normal\" results with no e/o bleeding found at that time.     History/Other:    Past Medical History:  Past Medical History:    Essential hypertension    Hyperlipidemia     Past Surgical History:   Past Surgical History:   Procedure Laterality Date    Colonoscopy N/A 2019    Procedure: COLONOSCOPY;  Surgeon: Mario Alberto Hernandez MD;  Location:  ENDOSCOPY    Vascular surgery        Family History:   No family history on file.  Social History:    reports that she has never smoked. She has never used smokeless tobacco.     Allergies: No Known Allergies    Medications:    No current facility-administered medications on file prior to encounter.     Current Outpatient Medications on File Prior to Encounter   Medication Sig Dispense Refill    lisinopril 30 MG Oral Tab Take 1 tablet (30 mg total) by mouth daily. 30 tablet 0    folic acid 1 MG Oral Tab  Take 1 tablet (1 mg total) by mouth daily. 30 tablet 0    metoprolol Tartrate 25 MG Oral Tab Take 0.5 tablets (12.5 mg total) by mouth 2x Daily(Beta Blocker). 60 tablet 0    multivitamin Oral Tab Take 1 tablet by mouth daily. 30 tablet 0    collagenase 250 UNIT/GM External Ointment Apply 1 Application topically daily. Apply to the dark areas of R and L lower extremities as directed by wound care RN 90 g 0    oxyCODONE-acetaminophen 5-325 MG Oral Tab Take 1 tablet by mouth every 4 (four) hours as needed. 20 tablet 0    Hydrocortisone Acetate 25 MG Rectal Suppos Place 1 suppository (25 mg total) rectally 2 (two) times daily as needed for Hemorrhoids. 12 suppository 0    docusate sodium 100 MG Oral Cap Take 100 mg by mouth 2 (two) times daily. 60 capsule 0    Senna 8.6 MG Oral Tab Take 1 tablet (8.6 mg total) by mouth daily as needed (constipation). 30 tablet 0    Alfuzosin HCl ER 10 MG Oral Tablet 24 Hr Take 1 tablet (10 mg total) by mouth daily with breakfast. 14 tablet 0    Pantoprazole Sodium 40 MG Oral Tab EC Take 1 tablet (40 mg total) by mouth every morning before breakfast. 30 tablet 0    acetaminophen 325 MG Oral Tab Take 650 mg by mouth every 6 (six) hours as needed for Pain.      aspirin 81 MG Oral Tab Take 81 mg by mouth daily.      Clopidogrel Bisulfate 75 MG Oral Tab Take 75 mg by mouth daily.      ferrous sulfate 325 (65 FE) MG Oral Tab EC Take 325 mg by mouth daily with breakfast.         Review of Systems:   A comprehensive review of systems was completed.    Pertinent positives and negatives noted in the HPI.    Objective:   Physical Exam:    /60   Pulse 54   Temp 97.6 °F (36.4 °C)   Resp 26   Ht 5' 5\" (1.651 m)   Wt 150 lb (68 kg)   SpO2 100%   BMI 24.96 kg/m²   General: No acute distress, Alert; frail/elderly appearing, pale  Respiratory: No rhonchi, no wheezes  Cardiovascular: S1, S2. Regular rate and rhythm  Abdomen: Soft, Non-tender, non-distended, positive bowel sounds  Neuro: No  new focal deficits  Extremities: No edema    Results:    Labs:      Labs Last 24 Hours:    Recent Labs   Lab 06/21/24  1134   RBC 2.57*   HGB 5.6*   HCT 19.9*   MCV 77.4*   MCH 21.8*   MCHC 28.1*   RDW 20.4   NEPRELIM 4.98   WBC 6.5   .0       Recent Labs   Lab 06/21/24  1134   *   BUN 31*   CREATSERUM 0.90   EGFRCR 65   CA 8.6   ALB 2.9*      K 4.4      CO2 20.0*   ALKPHO 102   AST 12*   ALT 15   BILT 0.2   TP 6.5       Lab Results   Component Value Date    INR 1.09 11/14/2019       No results for input(s): \"TROP\", \"TROPHS\", \"CK\" in the last 168 hours.    No results for input(s): \"TROP\", \"PBNP\" in the last 168 hours.    No results for input(s): \"PCT\" in the last 168 hours.    Imaging: Imaging data reviewed in Epic.    Assessment & Plan:      #Acute on chronic blood loss anemia, unclear etiology. HDS on presentation suggesting chronic, microscopic losses  #Severe iron deficiency  - iron studies ordered and reviewed  - IV iron while inpatient   - LDH, haptoglobin, reticulocyte count ordered  - transfuse 2u PRBC's in ED  - PO PPI daily  - hold DAPT > cardiology consulted per GI recommendation   - GI consulted from ED > plan for endoscopy tomorrow    #HTN  - hold lisinopril, metoprolol    #HLD  - statin    #PAD  - cardiology consulted for plavix review  - holding DAPT  - statin    #Hx of leukoctyoclastic vasculitis     #MGUS        Plan of care discussed with pt, pt's family, GI, ED    Jacy Peoples DO    Supplementary Documentation:     The 21st Century Cures Act makes medical notes like these available to patients in the interest of transparency. Please be advised this is a medical document. Medical documents are intended to carry relevant information, facts as evident, and the clinical opinion of the practitioner. The medical note is intended as peer to peer communication and may appear blunt or direct. It is written in medical language and may contain abbreviations or verbiage that  are unfamiliar.

## 2024-06-21 NOTE — ED INITIAL ASSESSMENT (HPI)
Pt presents wit ED with c/o headache, numbness, dizzness and generalized pain x 2 week. Family states pt does not like to go to the doctor but d/t symptoms getting worst pt brought in today. Pt uses a wheelchair, alert and oriented, respirations regular and unlabored, skin warm and dry.

## 2024-06-21 NOTE — ED QUICK NOTES
Orders for admission, patient is aware of plan and ready to go upstairs. Any questions, please call ED RN Stella at extension [phone].     Patient Covid vaccination status: Fully vaccinated     COVID Test Ordered in ED: SARS-CoV-2/Flu A and B/RSV by PCR (GeneXpert)    COVID Suspicion at Admission: N/A    Running Infusions:    sodium chloride 125 mL/hr (06/21/24 1155)        Mental Status/LOC at time of transport: A&Ox4    Other pertinent information: [place], primary E[place]rean speaking   CIWA score: N/A   NIH score:  N/A

## 2024-06-22 ENCOUNTER — APPOINTMENT (OUTPATIENT)
Dept: CV DIAGNOSTICS | Facility: HOSPITAL | Age: 79
DRG: 812 | End: 2024-06-22
Attending: INTERNAL MEDICINE

## 2024-06-22 PROBLEM — I10 PRIMARY HYPERTENSION: Status: ACTIVE | Noted: 2024-06-22

## 2024-06-22 LAB
ANION GAP SERPL CALC-SCNC: 8 MMOL/L (ref 0–18)
BASOPHILS # BLD AUTO: 0.03 X10(3) UL (ref 0–0.2)
BASOPHILS NFR BLD AUTO: 0.5 %
BUN BLD-MCNC: 19 MG/DL (ref 9–23)
CALCIUM BLD-MCNC: 8.3 MG/DL (ref 8.5–10.1)
CHLORIDE SERPL-SCNC: 114 MMOL/L (ref 98–112)
CO2 SERPL-SCNC: 20 MMOL/L (ref 21–32)
CREAT BLD-MCNC: 0.52 MG/DL
EGFRCR SERPLBLD CKD-EPI 2021: 94 ML/MIN/1.73M2 (ref 60–?)
EOSINOPHIL # BLD AUTO: 0.12 X10(3) UL (ref 0–0.7)
EOSINOPHIL NFR BLD AUTO: 1.9 %
ERYTHROCYTE [DISTWIDTH] IN BLOOD BY AUTOMATED COUNT: 18.7 %
GLUCOSE BLD-MCNC: 76 MG/DL (ref 70–99)
HCT VFR BLD AUTO: 26.9 %
HGB BLD-MCNC: 7.9 G/DL
HGB BLD-MCNC: 8.2 G/DL
IMM GRANULOCYTES # BLD AUTO: 0.02 X10(3) UL (ref 0–1)
IMM GRANULOCYTES NFR BLD: 0.3 %
LYMPHOCYTES # BLD AUTO: 0.79 X10(3) UL (ref 1–4)
LYMPHOCYTES NFR BLD AUTO: 12.2 %
MCH RBC QN AUTO: 24.2 PG (ref 26–34)
MCHC RBC AUTO-ENTMCNC: 30.5 G/DL (ref 31–37)
MCV RBC AUTO: 79.4 FL
MONOCYTES # BLD AUTO: 0.55 X10(3) UL (ref 0.1–1)
MONOCYTES NFR BLD AUTO: 8.5 %
NEUTROPHILS # BLD AUTO: 4.96 X10 (3) UL (ref 1.5–7.7)
NEUTROPHILS # BLD AUTO: 4.96 X10(3) UL (ref 1.5–7.7)
NEUTROPHILS NFR BLD AUTO: 76.6 %
OSMOLALITY SERPL CALC.SUM OF ELEC: 295 MOSM/KG (ref 275–295)
PLATELET # BLD AUTO: 273 10(3)UL (ref 150–450)
POTASSIUM SERPL-SCNC: 3.9 MMOL/L (ref 3.5–5.1)
RBC # BLD AUTO: 3.39 X10(6)UL
SODIUM SERPL-SCNC: 142 MMOL/L (ref 136–145)
WBC # BLD AUTO: 6.5 X10(3) UL (ref 4–11)

## 2024-06-22 PROCEDURE — 93306 TTE W/DOPPLER COMPLETE: CPT | Performed by: INTERNAL MEDICINE

## 2024-06-22 PROCEDURE — 99232 SBSQ HOSP IP/OBS MODERATE 35: CPT | Performed by: INTERNAL MEDICINE

## 2024-06-22 RX ORDER — ACETAMINOPHEN 500 MG
500 TABLET ORAL EVERY 4 HOURS PRN
Status: DISCONTINUED | OUTPATIENT
Start: 2024-06-22 | End: 2024-06-24

## 2024-06-22 NOTE — PROGRESS NOTES
Received pt at 0700, a&ox4- Mongolian speaking. O2 WNL on RA. Echo completed/ SB on tele, VSS. Holding plavix and aspirin. Ucx (+) for e.coli. General and abd pain, PRN Tylenol given. WC bound. Hgb checks. 0.9 @60ml/hr. Plan for EGD/colonoscopy in AM, consent signed and on chart. Family at bedside and updated on POC, no further questions on POC at this time.       Problem: Patient/Family Goals  Goal: Patient/Family Long Term Goal  Description: Patient's Long Term Goal: discharge home with appropriate means    Interventions:  - consults  -blood transfusion  PT/OT eval  - See additional Care Plan goals for specific interventions  Outcome: Progressing  Goal: Patient/Family Short Term Goal  Description: Patient's Short Term Goal:   6/22am: rest comfortably     Interventions:   - cluster care  - meds per MAR   - See additional Care Plan goals for specific interventions  Outcome: Progressing     Problem: SAFETY ADULT - FALL  Goal: Free from fall injury  Description: INTERVENTIONS:  - Assess pt frequently for physical needs  - Identify cognitive and physical deficits and behaviors that affect risk of falls.  - [place] fall precautions as indicated by assessment.  - Educate pt/family on patient safety including physical limitations  - Instruct pt to call for assistance with activity based on assessment  - Modify environment to reduce risk of injury  - Provide assistive devices as appropriate  - Consider OT/PT consult to assist with strengthening/mobility  - Encourage toileting schedule  Outcome: Progressing     Problem: CARDIOVASCULAR - ADULT  Goal: Maintains optimal cardiac output and hemodynamic stability  Description: INTERVENTIONS:  - Monitor vital signs, rhythm, and trends  - Monitor for bleeding, hypotension and signs of decreased cardiac output  - Evaluate effectiveness of vasoactive medications to optimize hemodynamic stability  - Monitor arterial and/or venous puncture sites for bleeding and/or hematoma  - Assess  quality of pulses, skin color and temperature  - Assess for signs of decreased coronary artery perfusion - ex. Angina  - Evaluate fluid balance, assess for edema, trend weights  Outcome: Progressing     Problem: HEMATOLOGIC - ADULT  Goal: Maintains hematologic stability  Description: INTERVENTIONS  - Assess for signs and symptoms of bleeding or hemorrhage  - Monitor labs and vital signs for trends  - Administer supportive blood products/factors, fluids and medications as ordered and appropriate  - Administer supportive blood products/factors as ordered and appropriate  Outcome: Progressing

## 2024-06-22 NOTE — PROGRESS NOTES
[facility]   part of Merg[facility] with [facility]     Hospitalist Progress Note     Anya Bautista Patient Status:  Inpatient    1945 MRN [id number]   Location [facility] 5NW-A Attending Jacy Peoples,    Hosp Day # 1 PCP DIAN Hernandez     Chief Complaint: generalized weakness    Subjective:     Patient resting comfortably in bed, no complaints     Objective:    Review of Systems:   A comprehensive review of systems was completed; pertinent positive and negatives stated in subjective.    Vital signs:  Temp:  [97.2 °F (36.2 °C)-98 °F (36.7 °C)] 98 °F (36.7 °C)  Pulse:  [53-65] 64  Resp:  [9-34] 17  BP: ()/(32-82) 129/47  SpO2:  [93 %-100 %] 93 %    Physical Exam:    General: No acute distress; Alert; frail/elderly appearing, pale   Respiratory: No wheezes, no rhonchi  Cardiovascular: S1, S2, regular rate and rhythm  Abdomen: Soft, Non-tender, non-distended, positive bowel sounds  Neuro: No new focal deficits.   Extremities: No edema      Diagnostic Data:    Labs:  Recent Labs   Lab 24  1134 24  1728 24  0112   WBC 6.5  --   --    HGB 5.6* 6.7* 7.9*   MCV 77.4*  --   --    .0  --   --        Recent Labs   Lab 24  1134   *   BUN 31*   CREATSERUM 0.90   CA 8.6   ALB 2.9*      K 4.4      CO2 20.0*   ALKPHO 102   AST 12*   ALT 15   BILT 0.2   TP 6.5       Estimated Creatinine Clearance: 44.9 mL/min (based on SCr of 0.9 mg/dL).    No results for input(s): \"TROP\", \"TROPHS\", \"CK\" in the last 168 hours.    No results for input(s): \"PTP\", \"INR\" in the last 168 hours.     Microbiology    No results found for this visit on 24.      Imaging: Reviewed in Epic.    Medications:    tamsulosin  0.4 mg Oral Daily @ 0700    sodium ferric gluconate  125 mg Intravenous Daily    folic acid  1 mg Oral Daily    pantoprazole  40 mg Oral QAM AC       Assessment & Plan:      #Acute on chronic blood loss anemia, unclear etiology. HDS on presentation suggesting  chronic, microscopic losses  #Severe iron deficiency  - iron studies ordered and reviewed  - IV iron while inpatient   - LDH, haptoglobin, reticulocyte count ordered  - transfuse 2u PRBC's in ED  - PO PPI daily  - hold DAPT > cardiology consulted per GI recommendation   - GI consulted from ED > plan for endoscopy tomorrow     #HTN  - hold lisinopril, metoprolol     #HLD  - statin     #PAD  - cardiology consulted for plavix review  - holding DAPT  - statin  - echo ordered      #Hx of leukoctyoclastic vasculitis      #MGUS      Jacy Peoples,     Supplementary Documentation:     Quality:  DVT Mechanical Prophylaxis:   SCDs,    DVT Pharmacologic Prophylaxis   Medication   None                Code Status: Full Code  [place]: No urinary catheter in place  [place] Duration (in days):   Central line:    ERIS:     Discharge is dependent on: clinical state  At this point Ms. Bautista is expected to be discharge to: home    The 21st Century Cures Act makes medical notes like these available to patients in the interest of transparency. Please be advised this is a medical document. Medical documents are intended to carry relevant information, facts as evident, and the clinical opinion of the practitioner. The medical note is intended as peer to peer communication and may appear blunt or direct. It is written in medical language and may contain abbreviations or verbiage that are unfamiliar.             **Certification      PHYSICIAN Certification of Need for Inpatient Hospitalization - Initial Certification    Patient will require inpatient services that will reasonably be expected to span two midnight's based on the clinical documentation in H+P.   Based on patients current state of illness, I anticipate that, after discharge, patient will require TBD.

## 2024-06-22 NOTE — PROGRESS NOTES
[facility]  [facility]  Cardiology Progress Note    Anya Bautista Patient Status:  Inpatient    1945 MRN [id number]   Location [facility] 5NW-A Attending Jacy Peoples, DO   Hosp Day # 1 PCP Lubna Arshad, DIAN       Subjective: No chest pain, dyspnea, abdominal pain or lower extremity pain/swelling.    Objective:   Temp: 98.2 °F (36.8 °C)  Pulse: 56  Resp: 16  BP: 142/44    Intake/Output:     Intake/Output Summary (Last 24 hours) at 2024 1214  Last data filed at 2024 1100  Gross per 24 hour   Intake 1773.58 ml   Output --   Net 1773.58 ml       Last 3 Weights   24 0747 150 lb (68 kg)   24 1126 150 lb (68 kg)   19 0436 134 lb 4.8 oz (60.9 kg)   19 2326 134 lb 4.8 oz (60.9 kg)   19 1859 141 lb (64 kg)       Tele: SB-NSR with no tele alarms reported    Physical Exam:     General: Alert and oriented x 3. No apparent distress. No respiratory or constitutional distress.  HEENT: Normocephalic, anicteric sclera, neck supple.  Neck: No JVD, carotids 2+, no bruits.  Cardiac: Regular rate and rhythm. S1, S2 normal. No murmur, pericardial rub, S3.  Lungs: Clear without wheezes, rales, rhonchi or dullness.  Normal excursions and effort.  Abdomen: Soft, non-tender.   Extremities: Without clubbing, cyanosis or edema.  Peripheral pulses are 2+.  Neurologic: Alert and oriented, normal affect.  Skin: Warm and dry.     Laboratory/Data:    Labs:         Recent Labs   Lab 24  1134 24  1728 24  0112 24  0707   WBC 6.5  --   --  6.5   HGB 5.6* 6.7* 7.9* 8.2*   MCV 77.4*  --   --  79.4*   .0  --   --  273.0       Recent Labs   Lab 24  1134 24  0707    142   K 4.4 3.9    114*   CO2 20.0* 20.0*   BUN 31* 19   CREATSERUM 0.90 0.52*   CA 8.6 8.3*   * 76       Recent Labs   Lab 24  1134 24  1157   ALT 15  --    AST 12*  --    ALB 2.9*  --    LDH  --  180       No results for  input(s): \"TROP\", \"TROPHS\", \"CK\" in the last 168 hours.    Allergies:   No Known Allergies    Medications:  Current Facility-Administered Medications   Medication Dose Route Frequency    acetaminophen (Tylenol Extra Strength) tab 500 mg  500 mg Oral Q4H PRN    sodium chloride 0.9% infusion  60 mL/hr Intravenous Continuous    tamsulosin (Flomax) cap 0.4 mg  0.4 mg Oral Daily @ 0700    melatonin tab 3 mg  3 mg Oral Nightly PRN    ondansetron (Zofran) 4 MG/2ML injection 4 mg  4 mg Intravenous Q6H PRN    polyethylene glycol (PEG 3350) (Miralax) 17 g oral packet 17 g  17 g Oral Daily PRN    sennosides (Senokot) tab 17.2 mg  17.2 mg Oral Nightly PRN    bisacodyl (Dulcolax) 10 MG rectal suppository 10 mg  10 mg Rectal Daily PRN    fleet enema (Fleet) 7-19 GM/118ML rectal enema 133 mL  1 enema Rectal Once PRN    sodium ferric gluconate (Ferrlecit) 125 mg in sodium chloride 0.9% 100mL IVPB premix  125 mg Intravenous Daily    folic acid (Folvite) tab 1 mg  1 mg Oral Daily    pantoprazole (Protonix) DR tab 40 mg  40 mg Oral QAM AC     Imaging:  Echo 6/22/2024: Conclusions:   1. Left ventricle: The cavity size was normal. Wall thickness was mildly to moderately increased. Systolic function was normal. The estimated ejection fraction was 60-65%, by visual assessment. Diastolic dysfunction present but unable to assess severity. The ratio of systolic to diastolic pulmonary vein flow was reduced (diastolic predominant). Doppler parameters are consistent with elevated mean left atrial filling pressure.   2. Left ventricle: There is moderate hypokinesis of the apex.   3. Right ventricle: The cavity size was normal. Systolic function was normal.   4. Ventricular septum: Thickness was mildly increased. The outflow septum had a sigmoid appearance.   5. Left atrium: The left atrial volume was markedly increased.   6. Aortic valve: The valve was probably trileaflet. The leaflets were moderately thickened and moderately calcified. Cusp  separation was    reduced. Transvalvular velocity was increased, due to stenosis. The findings were consistent with moderate stenosis. The peak systolic velocity was 3.54m/sec. The mean systolic gradient was 24mm Hg. The valve area (VTI) was 1.09cm^2. The valve area (VTI) index was 0.62cm^2/m^2.   7. Mitral valve: The annulus was moderately calcified. The leaflets were mildly to moderately thickened and mildly calcified. There was mild to moderate regurgitation.   8. Tricuspid valve: There was moderate regurgitation.   9. Pulmonary arteries: Systolic pressure was moderately increased, in the range of 55mm Hg to 60mm Hg.   Impressions:  This study is compared with previous dated 11/03/2019: Progession of aortic stenosis from mild to now moderate range of stenosis.     Assessment:  Symptomatic anemia with hemoglobin 5.6 in the emergency room.  GI on consult  Cardiac clearance prior to the scope and regarding dual antiplatelet therapy management  Worsening generalized weakness with dizziness and lightheadedness due to symptomatic anemia.  Peripheral arterial disease - possibly on on Plavix and aspirin 81 mg p.o. daily PTA  Chronic anemia with worsening hemoglobin -peripheral etiologies of anemia MGUS with chronic kidney disease and iron deficiency in the past -last upper and lower scopes 2019 but no bleeders.  Systemic hypertension controlled with beta-blocker and lisinopril as outpatient but with anemia BP lower  aortic stenosis - progressed from mild to moderate based on echo today  Dyslipidemia on statin.  GERD.  History of lower extremity wounds due to peripheral arterial disease 2019.  Physical deconditioning    Plan:  -tele monitoring  -acceptable cardiac risk for planned EGD/colonoscopy per GI service with   -await GI procedure results and will start either aspirin 81 mg daily or plavix 75 mg daily; further evaluation and follow-up of PAD with her primary cardiologist Dr. Kessler in the [facility] as  an outpatient  -normotensive and off anti-HTN meds  -resume statin when OK by all services    D/w patient.    Jose Luis Marcial MD  6/22/2024  12:14 PM

## 2024-06-22 NOTE — PROGRESS NOTES
GI Note:    Hgb stable. No melena or hematochezia. Hemodynamically stable. Recommend EGD/Colonoscopy tomorrow to evaluate for causes of anemia. Continue JESSICA Ortiz  Gastroenterology  [facility],E

## 2024-06-22 NOTE — PLAN OF CARE
Patient is alert and oriented x4. [place] speaking. WDL on RA. SB on tele. VSS. PT/OT to eval. WC bound. IVF infusing. C/o gen pain, prn tylenol administered. 2D echo pending. Pt received 2 units of blood, post infusion hgb is 7.9. safety precautions in place. POC discussed with pt, pt verbalizes understanding.   Problem: Patient/Family Goals  Goal: Patient/Family Long Term Goal  Description: Patient's Long Term Goal: discharge home with appropriate means    Interventions:  - consults  -blood transfusion  PT/OT eval  - See additional Care Plan goals for specific interventions  Outcome: Progressing  Goal: Patient/Family Short Term Goal  Description: Patient's Short Term Goal: discharge home with appropriate means    Interventions:   - consults  - See additional Care Plan goals for specific interventions  Outcome: Progressing     Problem: SAFETY ADULT - FALL  Goal: Free from fall injury  Description: INTERVENTIONS:  - Assess pt frequently for physical needs  - Identify cognitive and physical deficits and behaviors that affect risk of falls.  - [place] fall precautions as indicated by assessment.  - Educate pt/family on patient safety including physical limitations  - Instruct pt to call for assistance with activity based on assessment  - Modify environment to reduce risk of injury  - Provide assistive devices as appropriate  - Consider OT/PT consult to assist with strengthening/mobility  - Encourage toileting schedule  Outcome: Progressing     Problem: CARDIOVASCULAR - ADULT  Goal: Maintains optimal cardiac output and hemodynamic stability  Description: INTERVENTIONS:  - Monitor vital signs, rhythm, and trends  - Monitor for bleeding, hypotension and signs of decreased cardiac output  - Evaluate effectiveness of vasoactive medications to optimize hemodynamic stability  - Monitor arterial and/or venous puncture sites for bleeding and/or hematoma  - Assess quality of pulses, skin color and temperature  - Assess for  signs of decreased coronary artery perfusion - ex. Angina  - Evaluate fluid balance, assess for edema, trend weights  Outcome: Progressing     Problem: HEMATOLOGIC - ADULT  Goal: Maintains hematologic stability  Description: INTERVENTIONS  - Assess for signs and symptoms of bleeding or hemorrhage  - Monitor labs and vital signs for trends  - Administer supportive blood products/factors, fluids and medications as ordered and appropriate  - Administer supportive blood products/factors as ordered and appropriate  Outcome: Progressing

## 2024-06-22 NOTE — PHYSICAL THERAPY NOTE
PHYSICAL THERAPY EVALUATION - INPATIENT     Room Number: 506/506-A  Evaluation Date: 6/22/2024  Type of Evaluation: Initial  Physician Order: PT Eval and Treat    Presenting Problem: anemia     Reason for Therapy: Mobility Dysfunction and Discharge Planning    PHYSICAL THERAPY ASSESSMENT   Patient is currently functioning near baseline with bed mobility, transfers, and gait.  Prior to admission, patient's baseline is ambulating short distances with RW.  Patient is requiring assist with mobility as a result of the following impairments: pain.  Physical Therapy will continue to follow for duration of hospitalization.    Patient will benefit from continued skilled PT Services For duration of hospitalization, however, given the patient is functioning near baseline level do not anticipate skilled therapy needs at discharge .    PLAN  PT Treatment Plan: Bed mobility;Body mechanics;Endurance;Family education;Gait training;Patient education;Energy conservation;Transfer training;Balance training  Rehab Potential : Fair  Frequency (Obs):  (2-3x/week)  Number of Visits to Meet Established Goals: 3      CURRENT GOALS    Goal #1    Goal #2 Patient is able to demonstrate transfers Sit to/from Stand at assistance level: minimum assistance     Goal #3 Patient is able to ambulate 10 feet with assist device: walker - rolling at assistance level: minimum assistance     Goal #4    Goal #5    Goal #6    Goal Comments: Goals established on 6/22/2024      PHYSICAL THERAPY MEDICAL/SOCIAL HISTORY  History related to current admission: Patient is a 79 year old female admitted on 6/21/2024 from home for weakness.  Pt diagnosed with +anemia, awaiting EGD on 6/23.      HOME SITUATION  Type of Home: House   Home Layout: Two level;Able to live on main level                Lives With: Daughter;Son     Patient Owned Equipment: Rolling walker       Prior Level of [place]: Pt reports ambulatory with RW short distances only.  Pt uses  transport chair in the community.  Family able to assist.  Pt reports stays on 1st level of home only.     SUBJECTIVE  \"It's too much!\" Re: pain via  ipad.      OBJECTIVE  Precautions:  needed  Fall Risk: High fall risk    WEIGHT BEARING RESTRICTION                   PAIN ASSESSMENT  Rating: Unable to rate  Location: initially just L foot, progressing to R foot and B knees  Management Techniques: Activity promotion;Body mechanics;Repositioning;Relaxation    COGNITION  Following Commands:  follows all commands and directions without difficulty    RANGE OF MOTION AND STRENGTH ASSESSMENT  Upper extremity ROM and strength are within functional limits     Lower extremity ROM is within functional limits     Lower extremity strength is within functional limits       BALANCE  Static Sitting: Good  Dynamic Sitting: Good  Static Standing: Dependent  Dynamic Standing: Not tested    ADDITIONAL TESTS                                    ACTIVITY TOLERANCE                         O2 WALK       NEUROLOGICAL FINDINGS                        AM-PAC '6-Clicks' INPATIENT SHORT FORM - BASIC MOBILITY  How much difficulty does the patient currently have...  Patient Difficulty: Turning over in bed (including adjusting bedclothes, sheets and blankets)?: None   Patient Difficulty: Sitting down on and standing up from a chair with arms (e.g., wheelchair, bedside commode, etc.): A Lot   Patient Difficulty: Moving from lying on back to sitting on the side of the bed?: None   How much help from another person does the patient currently need...   Help from Another: Moving to and from a bed to a chair (including a wheelchair)?: A Lot   Help from Another: Need to walk in hospital room?: A Lot   Help from Another: Climbing 3-5 steps with a railing?: A Lot       AM-PAC Score:  Raw Score: 16   Approx Degree of Impairment: 54.16%   Standardized Score (AM-PAC Scale): 40.78   CMS Modifier (G-Code): CK    FUNCTIONAL ABILITY  STATUS  Gait Assessment   Functional Mobility/Gait Assessment  Gait Assistance: Not tested  Distance (ft): 0    Skilled Therapy Provided     Bed Mobility:    Supine to sit: mod ind   Sit to supine: NT     Transfer Mobility:  Sit to stand: max A- physically resistive partially through transfer 2/2 reports of increasing pain.  Cues for hand placement.     Therapist's Comments: Pt got self to side of bed ind just prior to session.  Received partially eob with family present.  Pt able to scoot self to eob without issue.  Family assisted with donning shoes.  3 attempts at stance with pt initially getting most of the way up into full erect stance, then yelling no and sitting back despite attempted max A from writer.  Pt declines sitting in chair.  RN aware of request for pain meds.      Exercise/Education Provided:  Body mechanics  Transfer training    Patient End of Session: Up in chair;Needs met;Call light within reach;RN aware of session/findings;All patient questions and concerns addressed      Patient Evaluation Complexity Level:  History Moderate - 1 or 2 personal factors and/or co-morbidities   Examination of body systems Moderate - addressing a total of 3 or more elements   Clinical Presentation Moderate - Evolving   Clinical Decision Making Moderate - Evolving       PT Session Time: 20 minutes    Therapeutic Activity: 10 minutes

## 2024-06-23 ENCOUNTER — ANESTHESIA (OUTPATIENT)
Dept: ENDOSCOPY | Facility: HOSPITAL | Age: 79
DRG: 812 | End: 2024-06-23

## 2024-06-23 ENCOUNTER — ANESTHESIA EVENT (OUTPATIENT)
Dept: ENDOSCOPY | Facility: HOSPITAL | Age: 79
DRG: 812 | End: 2024-06-23

## 2024-06-23 LAB
BASOPHILS # BLD AUTO: 0.03 X10(3) UL (ref 0–0.2)
BASOPHILS NFR BLD AUTO: 0.4 %
BLOOD TYPE BARCODE: 5100
EOSINOPHIL # BLD AUTO: 0.17 X10(3) UL (ref 0–0.7)
EOSINOPHIL NFR BLD AUTO: 2.5 %
ERYTHROCYTE [DISTWIDTH] IN BLOOD BY AUTOMATED COUNT: 19.5 %
HCT VFR BLD AUTO: 29.5 %
HGB BLD-MCNC: 8.5 G/DL
IMM GRANULOCYTES # BLD AUTO: 0.03 X10(3) UL (ref 0–1)
IMM GRANULOCYTES NFR BLD: 0.4 %
LYMPHOCYTES # BLD AUTO: 0.92 X10(3) UL (ref 1–4)
LYMPHOCYTES NFR BLD AUTO: 13.3 %
MCH RBC QN AUTO: 23.7 PG (ref 26–34)
MCHC RBC AUTO-ENTMCNC: 28.8 G/DL (ref 31–37)
MCV RBC AUTO: 82.4 FL
MONOCYTES # BLD AUTO: 0.6 X10(3) UL (ref 0.1–1)
MONOCYTES NFR BLD AUTO: 8.7 %
NEUTROPHILS # BLD AUTO: 5.18 X10 (3) UL (ref 1.5–7.7)
NEUTROPHILS # BLD AUTO: 5.18 X10(3) UL (ref 1.5–7.7)
NEUTROPHILS NFR BLD AUTO: 74.7 %
PLATELET # BLD AUTO: 323 10(3)UL (ref 150–450)
RBC # BLD AUTO: 3.58 X10(6)UL
UNIT VOLUME: 350 ML
WBC # BLD AUTO: 6.9 X10(3) UL (ref 4–11)

## 2024-06-23 PROCEDURE — 0DJ08ZZ INSPECTION OF UPPER INTESTINAL TRACT, VIA NATURAL OR ARTIFICIAL OPENING ENDOSCOPIC: ICD-10-PCS | Performed by: INTERNAL MEDICINE

## 2024-06-23 PROCEDURE — 99232 SBSQ HOSP IP/OBS MODERATE 35: CPT | Performed by: INTERNAL MEDICINE

## 2024-06-23 RX ORDER — SODIUM CHLORIDE, SODIUM LACTATE, POTASSIUM CHLORIDE, CALCIUM CHLORIDE 600; 310; 30; 20 MG/100ML; MG/100ML; MG/100ML; MG/100ML
INJECTION, SOLUTION INTRAVENOUS CONTINUOUS
Status: DISCONTINUED | OUTPATIENT
Start: 2024-06-23 | End: 2024-06-23

## 2024-06-23 RX ORDER — GABAPENTIN 100 MG/1
100 CAPSULE ORAL 3 TIMES DAILY
Status: DISCONTINUED | OUTPATIENT
Start: 2024-06-23 | End: 2024-06-23

## 2024-06-23 RX ORDER — LISINOPRIL 30 MG/1
40 TABLET ORAL DAILY
Status: SHIPPED | COMMUNITY
Start: 2024-06-23 | End: 2024-06-24

## 2024-06-23 RX ORDER — NALOXONE HYDROCHLORIDE 0.4 MG/ML
0.08 INJECTION, SOLUTION INTRAMUSCULAR; INTRAVENOUS; SUBCUTANEOUS ONCE AS NEEDED
Status: DISCONTINUED | OUTPATIENT
Start: 2024-06-23 | End: 2024-06-23 | Stop reason: HOSPADM

## 2024-06-23 RX ORDER — LIDOCAINE HYDROCHLORIDE 10 MG/ML
INJECTION, SOLUTION EPIDURAL; INFILTRATION; INTRACAUDAL; PERINEURAL AS NEEDED
Status: DISCONTINUED | OUTPATIENT
Start: 2024-06-23 | End: 2024-06-23 | Stop reason: SURG

## 2024-06-23 RX ORDER — SODIUM CHLORIDE, SODIUM LACTATE, POTASSIUM CHLORIDE, CALCIUM CHLORIDE 600; 310; 30; 20 MG/100ML; MG/100ML; MG/100ML; MG/100ML
INJECTION, SOLUTION INTRAVENOUS CONTINUOUS PRN
Status: DISCONTINUED | OUTPATIENT
Start: 2024-06-23 | End: 2024-06-23 | Stop reason: SURG

## 2024-06-23 RX ORDER — HYDROCODONE BITARTRATE AND ACETAMINOPHEN 5; 325 MG/1; MG/1
1 TABLET ORAL EVERY 6 HOURS PRN
Status: DISCONTINUED | OUTPATIENT
Start: 2024-06-23 | End: 2024-06-24

## 2024-06-23 RX ORDER — GABAPENTIN 100 MG/1
100 CAPSULE ORAL 3 TIMES DAILY
Status: DISCONTINUED | OUTPATIENT
Start: 2024-06-23 | End: 2024-06-24

## 2024-06-23 RX ADMIN — LIDOCAINE HYDROCHLORIDE 50 MG: 10 INJECTION, SOLUTION EPIDURAL; INFILTRATION; INTRACAUDAL; PERINEURAL at 09:50:00

## 2024-06-23 RX ADMIN — SODIUM CHLORIDE, SODIUM LACTATE, POTASSIUM CHLORIDE, CALCIUM CHLORIDE: 600; 310; 30; 20 INJECTION, SOLUTION INTRAVENOUS at 09:50:00

## 2024-06-23 NOTE — INTERVAL H&P NOTE
Pre-op Diagnosis: Anemia, unspecified type [D64.9]    The above referenced H&P was reviewed by Koby Ortiz DO on 6/23/2024, the patient was examined and no significant changes have occurred in the patient's condition since the H&P was performed.  I discussed with the patient and/or legal representative the potential benefits, risks and side effects of this procedure; the likelihood of the patient achieving goals; and potential problems that might occur during recuperation.  I discussed reasonable alternatives to the procedure, including risks, benefits and side effects related to the alternatives and risks related to not receiving this procedure.  We will proceed with procedure as planned.

## 2024-06-23 NOTE — ANESTHESIA PREPROCEDURE EVALUATION
PRE-OP EVALUATION    Patient Name: Anya Bautista    Admit Diagnosis: Weakness generalized [R53.1]  Symptomatic anemia [D64.9]    Pre-op Diagnosis: Anemia, unspecified type [D64.9]    ESOPHAGOGASTRODUODENOSCOPY (EGD)    Anesthesia Procedure: ESOPHAGOGASTRODUODENOSCOPY (EGD)  COLONOSCOPY    Surgeons and Role:     * Koby Ortiz, DO - Primary    Pre-op vitals reviewed.  Temp: 98.1 °F (36.7 °C)  Pulse: 64  Resp: 18  BP: 127/49     Body mass index is 24.96 kg/m².    Current medications reviewed.  Hospital Medications:  • gabapentin (Neurontin) cap 100 mg  100 mg Oral TID   • acetaminophen (Tylenol Extra Strength) tab 500 mg  500 mg Oral Q4H PRN   • [COMPLETED] polyethylene glycol-electrolyte (Golytely) 236 g oral solution 4,000 mL  4,000 mL Oral Once   • sodium chloride 0.9% infusion  60 mL/hr Intravenous Continuous   • tamsulosin (Flomax) cap 0.4 mg  0.4 mg Oral Daily @ 0700   • melatonin tab 3 mg  3 mg Oral Nightly PRN   • ondansetron (Zofran) 4 MG/2ML injection 4 mg  4 mg Intravenous Q6H PRN   • polyethylene glycol (PEG 3350) (Miralax) 17 g oral packet 17 g  17 g Oral Daily PRN   • sennosides (Senokot) tab 17.2 mg  17.2 mg Oral Nightly PRN   • bisacodyl (Dulcolax) 10 MG rectal suppository 10 mg  10 mg Rectal Daily PRN   • fleet enema (Fleet) 7-19 GM/118ML rectal enema 133 mL  1 enema Rectal Once PRN   • sodium ferric gluconate (Ferrlecit) 125 mg in sodium chloride 0.9% 100mL IVPB premix  125 mg Intravenous Daily   • folic acid (Folvite) tab 1 mg  1 mg Oral Daily   • pantoprazole (Protonix) DR tab 40 mg  40 mg Oral QAM AC       Outpatient Medications:     Medications Prior to Admission   Medication Sig Dispense Refill Last Dose   • amLODIPine 5 MG Oral Tab Take 1 tablet (5 mg total) by mouth daily.   6/21/2024   • atorvastatin 80 MG Oral Tab Take 1 tablet (80 mg total) by mouth nightly.   6/21/2024   • gabapentin 100 MG Oral Cap Take 1 capsule (100 mg total) by mouth 3 (three) times daily.   6/21/2024   •  metFORMIN HCl ER, OSM, 500 MG (OSM) Oral Tablet 24 Hr Take 1 tablet (500 mg total) by mouth daily with breakfast.   6/21/2024   • hydroCHLOROthiazide 25 MG Oral Tab Take 1 tablet (25 mg total) by mouth daily.   6/21/2024   • omeprazole 20 MG Oral Capsule Delayed Release Take 1 capsule (20 mg total) by mouth daily as needed.   6/21/2024   • folic acid 1 MG Oral Tab Take 1 tablet (1 mg total) by mouth daily. 30 tablet 0 6/21/2024   • acetaminophen 325 MG Oral Tab Take 2 tablets (650 mg total) by mouth every 6 (six) hours as needed for Pain.   Past Month   • aspirin 81 MG Oral Tab Take 1 tablet (81 mg total) by mouth daily.   6/21/2024   • Clopidogrel Bisulfate 75 MG Oral Tab Take 1 tablet (75 mg total) by mouth daily.   6/21/2024   • [DISCONTINUED] lisinopril 30 MG Oral Tab Take 1 tablet (30 mg total) by mouth daily. (Patient taking differently: Take 40 mg by mouth daily.) 30 tablet 0 6/21/2024   • metoprolol Tartrate 25 MG Oral Tab Take 0.5 tablets (12.5 mg total) by mouth 2x Daily(Beta Blocker). 60 tablet 0    • multivitamin Oral Tab Take 1 tablet by mouth daily. 30 tablet 0    • collagenase 250 UNIT/GM External Ointment Apply 1 Application topically daily. Apply to the dark areas of R and L lower extremities as directed by wound care RN 90 g 0    • oxyCODONE-acetaminophen 5-325 MG Oral Tab Take 1 tablet by mouth every 4 (four) hours as needed. 20 tablet 0 Unknown   • Hydrocortisone Acetate 25 MG Rectal Suppos Place 1 suppository (25 mg total) rectally 2 (two) times daily as needed for Hemorrhoids. 12 suppository 0    • docusate sodium 100 MG Oral Cap Take 100 mg by mouth 2 (two) times daily. 60 capsule 0    • Senna 8.6 MG Oral Tab Take 1 tablet (8.6 mg total) by mouth daily as needed (constipation). 30 tablet 0    • Alfuzosin HCl ER 10 MG Oral Tablet 24 Hr Take 1 tablet (10 mg total) by mouth daily with breakfast. 14 tablet 0    • Pantoprazole Sodium 40 MG Oral Tab EC Take 1 tablet (40 mg total) by mouth every  morning before breakfast. 30 tablet 0    • ferrous sulfate 325 (65 FE) MG Oral Tab EC Take 1 tablet (325 mg total) by mouth daily with breakfast.          Allergies: Patient has no known allergies.      Anesthesia Evaluation    Patient summary reviewed.    Anesthetic Complications           GI/Hepatic/Renal             (+) chronic renal disease                    Cardiovascular        Exercise tolerance: good           (+) hypertension   (+) hyperlipidemia                                  Endo/Other  Comment: Leukocytoclastic vasculitis (HCC)  MGUS (monoclonal gammopathy of unknown significance)                 (+) anemia                   Pulmonary    Negative pulmonary ROS.             (-) recent URI          Neuro/Psych                              Cellulitis of right lower extremity  Extremity lower irrigation and debridement of right and left leg.   PAD (peripheral artery disease)    Past Surgical History:   Procedure Laterality Date   • Colonoscopy N/A 11/14/2019    Procedure: COLONOSCOPY;  Surgeon: Mario Alberto Hernandez MD;  Location:  ENDOSCOPY   • Vascular surgery       Social History     Socioeconomic History   • Marital status:    Tobacco Use   • Smoking status: Never   • Smokeless tobacco: Never     History   Drug Use Not on file     Available pre-op labs reviewed.  Lab Results   Component Value Date    WBC 6.9 06/23/2024    RBC 3.58 (L) 06/23/2024    HGB 8.5 (L) 06/23/2024    HCT 29.5 (L) 06/23/2024    MCV 82.4 06/23/2024    MCH 23.7 (L) 06/23/2024    MCHC 28.8 (L) 06/23/2024    RDW 19.5 06/23/2024    .0 06/23/2024     Lab Results   Component Value Date     06/22/2024    K 3.9 06/22/2024     (H) 06/22/2024    CO2 20.0 (L) 06/22/2024    BUN 19 06/22/2024    CREATSERUM 0.52 (L) 06/22/2024    GLU 76 06/22/2024    CA 8.3 (L) 06/22/2024            Airway      Mallampati: I  Mouth opening: >3 FB  TM distance: > 6 cm  Neck ROM: full Cardiovascular    Cardiovascular exam normal.          Dental  Comment: Many missing diseased, compromised teeth           Pulmonary    Pulmonary exam normal.                 Other findings        ASA: 3   Plan: MAC  NPO status verified and patient meets guidelines.    Post-procedure pain management plan discussed with surgeon and patient.    Comment: Plan IV sedation with GA backup.  Risks and benefits of MAC/GA explained including but not limited to aspiration, mouth/dental/airway injury, PONV explained.  Understanding expressed as well as a wish to proceed.    Plan/risks discussed with: patient            Present on Admission:  **None**

## 2024-06-23 NOTE — PROGRESS NOTES
[facility]   part of [facility]     Hospitalist Progress Note     Anya Bautista Patient Status:  Inpatient    1945 MRN [id number]   Location [facility] 5NW-A Attending Jacy Peoples, DO   Hosp Day # 2 PCP DIAN Hernandez     Chief Complaint: generalized weakness    Subjective:     Resting in bed, declined prep last night. Waiting EGD later this morning. Complaining of chronic pain in her legs    Objective:    Review of Systems:   A comprehensive review of systems was completed; pertinent positive and negatives stated in subjective.    Vital signs:  Temp:  [97.3 °F (36.3 °C)-98.2 °F (36.8 °C)] 98 °F (36.7 °C)  Pulse:  [56-64] 64  Resp:  [16-20] 20  BP: (117-142)/(35-54) 131/54  SpO2:  [96 %] 96 %    Physical Exam:    General: No acute distress; Alert; frail/elderly appearing, pale   Respiratory: No wheezes, no rhonchi  Cardiovascular: S1, S2, regular rate and rhythm  Abdomen: Soft, Non-tender, non-distended, positive bowel sounds  Neuro: No new focal deficits.   Extremities: No edema      Diagnostic Data:    Labs:  Recent Labs   Lab 24  1134 24  1728 24  0112 24  0707   WBC 6.5  --   --  6.5   HGB 5.6* 6.7* 7.9* 8.2*   MCV 77.4*  --   --  79.4*   .0  --   --  273.0       Recent Labs   Lab 24  1134 24  0707   * 76   BUN 31* 19   CREATSERUM 0.90 0.52*   CA 8.6 8.3*   ALB 2.9*  --     142   K 4.4 3.9    114*   CO2 20.0* 20.0*   ALKPHO 102  --    AST 12*  --    ALT 15  --    BILT 0.2  --    TP 6.5  --        Estimated Creatinine Clearance: 77.6 mL/min (A) (based on SCr of 0.52 mg/dL (L)).    No results for input(s): \"TROP\", \"TROPHS\", \"CK\" in the last 168 hours.    No results for input(s): \"PTP\", \"INR\" in the last 168 hours.     Microbiology    Hospital Encounter on 24   1. Urine Culture, Routine     Status: Abnormal    Collection Time: 24 11:58 AM    Specimen: Urine, clean catch   Result Value Ref Range    Urine  Culture >100,000 CFU/ML Escherichia coli (A) N/A       Susceptibility    Escherichia coli -  (no method available)     Ampicillin >=32 Resistant      Ampicillin + Sulbactam >=32 Resistant      Cefazolin 16 Sensitive      Ciprofloxacin <=0.25 Sensitive      Gentamicin <=1 Sensitive      Meropenem <=0.25 Sensitive      Levofloxacin <=0.12 Sensitive      Nitrofurantoin 32 Sensitive      Piperacillin + Tazobactam 16 Sensitive      Trimethoprim/Sulfa >=320 Resistant    2. Blood Culture     Status: None (Preliminary result)    Collection Time: 06/21/24 11:58 AM    Specimen: Blood,peripheral   Result Value Ref Range    Blood Culture Result No Growth 1 Day N/A         Imaging: Reviewed in Epic.    Medications:    gabapentin  100 mg Oral TID    tamsulosin  0.4 mg Oral Daily @ 0700    sodium ferric gluconate  125 mg Intravenous Daily    folic acid  1 mg Oral Daily    pantoprazole  40 mg Oral QAM AC       Assessment & Plan:      #Acute on chronic blood loss anemia, unclear etiology. HDS on presentation suggesting chronic, microscopic losses  #Severe iron deficiency  - iron studies ordered and reviewed  - IV iron while inpatient   - transfused 2u PRBC's in ED  - PO PPI daily  - hold DAPT > cardiology consulted per GI recommendation   - GI consulted from ED > plan for endoscopy this morning     #HTN  - hold lisinopril, metoprolol     #HLD  - statin     #PAD  - cardiology consulted for plavix review  - holding DAPT  - statin  - echo unremarkable      #Hx of leukoctyoclastic vasculitis      #MGUS    #Chronic pain  #Inability to ambulate  - norco  - PT/OT > NED Peoples, DO    Supplementary Documentation:     Quality:  DVT Mechanical Prophylaxis:   SCDs,    DVT Pharmacologic Prophylaxis   Medication   None                Code Status: Full Code  [place]: No urinary catheter in place  [place] Duration (in days):   Central line:    ERIS:     Discharge is dependent on: clinical state  At this point Ms. Bautista is expected to  be discharge to: home    The 21st Century Cures Act makes medical notes like these available to patients in the interest of transparency. Please be advised this is a medical document. Medical documents are intended to carry relevant information, facts as evident, and the clinical opinion of the practitioner. The medical note is intended as peer to peer communication and may appear blunt or direct. It is written in medical language and may contain abbreviations or verbiage that are unfamiliar.             **Certification      PHYSICIAN Certification of Need for Inpatient Hospitalization - Initial Certification    Patient will require inpatient services that will reasonably be expected to span two midnight's based on the clinical documentation in H+P.   Based on patients current state of illness, I anticipate that, after discharge, patient will require TBD.

## 2024-06-23 NOTE — PROGRESS NOTES
Received pt at 0700, a&ox4- Arabic speaking. O2 WNL on RA. SB on tele, VSS. Holding plavix and aspirin. Ucx (+) for e.coli. 0.9 @60ml/hr. EGD completed. Pt unable to stand, prn medications given. Discharge pending NED placement. Family at bedside and updated on POC, no further questions on POC at this time.       Problem: Patient/Family Goals  Goal: Patient/Family Long Term Goal  Description: Patient's Long Term Goal: discharge home with appropriate means    Interventions:  - consults  -blood transfusion  PT/OT eval  - See additional Care Plan goals for specific interventions  Outcome: Progressing  Goal: Patient/Family Short Term Goal  Description: Patient's Short Term Goal:   6/22am: rest comfortably   6:22 noc: EGD in am  6/23am: discharge planning     Interventions:   - cluster care  - meds per MAR   - See additional Care Plan goals for specific interventions  Outcome: Progressing     Problem: SAFETY ADULT - FALL  Goal: Free from fall injury  Description: INTERVENTIONS:  - Assess pt frequently for physical needs  - Identify cognitive and physical deficits and behaviors that affect risk of falls.  - [place] fall precautions as indicated by assessment.  - Educate pt/family on patient safety including physical limitations  - Instruct pt to call for assistance with activity based on assessment  - Modify environment to reduce risk of injury  - Provide assistive devices as appropriate  - Consider OT/PT consult to assist with strengthening/mobility  - Encourage toileting schedule  Outcome: Progressing     Problem: CARDIOVASCULAR - ADULT  Goal: Maintains optimal cardiac output and hemodynamic stability  Description: INTERVENTIONS:  - Monitor vital signs, rhythm, and trends  - Monitor for bleeding, hypotension and signs of decreased cardiac output  - Evaluate effectiveness of vasoactive medications to optimize hemodynamic stability  - Monitor arterial and/or venous puncture sites for bleeding and/or hematoma  - Assess  quality of pulses, skin color and temperature  - Assess for signs of decreased coronary artery perfusion - ex. Angina  - Evaluate fluid balance, assess for edema, trend weights  Outcome: Progressing     Problem: HEMATOLOGIC - ADULT  Goal: Maintains hematologic stability  Description: INTERVENTIONS  - Assess for signs and symptoms of bleeding or hemorrhage  - Monitor labs and vital signs for trends  - Administer supportive blood products/factors, fluids and medications as ordered and appropriate  - Administer supportive blood products/factors as ordered and appropriate  Outcome: Progressing

## 2024-06-23 NOTE — OPERATIVE REPORT
Anya Bautista Patient Status:  Inpatient    1945 MRN [id number]   Location [facility] Attending Jacy Peoples,    Hosp Day # 2 PCP DIAN Hernandez       PREOPERATIVE DIAGNOSIS/INDICATION: Anemia  POSTOPERTATIVE DIAGNOSIS: See Impression  PROCEDURE PERFORMED:  EGD   DATE: 24  SEDATION: MAC sedation provided by General Anesthesia    TIME OUT WAS PERFORMED    INFORMED CONSENT: I have discussed the risks, benefits, and alternatives to upper GI endoscopy with the patient including but not limited to the risks of bleeding, infection, pain, as well as the risks of anesthesia and perforation all possibly leading to prolonged hospitalization, surgical intervention. All questions were answered to the patient’s satisfaction. The patient elected to proceed with upper GI endoscopy with intervention as indicated.    PROCEDURE DESCRIPTION: A regular upper endoscope was introduced into the patient’s mouth, hypo pharynx, esophagus, stomach and the first and second portion of the duodenum. Retroflexion of the endoscope was performed in the stomach.  Careful examination of the above described areas was performed on withdrawal of the endoscope. The patient tolerated the procedure well.  There were no immediate complications immediately following the procedure and the patient was transferred to recovery in stable condition.  FINDINGS:  Esophagus: The esophagus was normal. The esophagogastric junction was 40 cm from the incisors. The squamocolumnar junction was 40 cm from the incisors and regular.     Stomach: 3 cm hiatal hernia. The gastric mucosa was normal.     Duodenum: The duodenal bulb was normal. The examined descending duodenum was normal.      IMPRESSION:   1. Small hiatal hernia.   RECOMMENDATIONS:    1. Patient refusing prep for colonoscopy.    2. Stable hgb and no ongoing GI bleeding.    3. Will schedule close outpatient GI follow up.    4. Recommend outpatient  heme referral.     5. Will sign, off please call with questions.     ANALI Ortiz  Gastroenterology/Advanced Endoscopy  [facility]

## 2024-06-23 NOTE — ANESTHESIA POSTPROCEDURE EVALUATION
[facility]    Anya Bautista Patient Status:  Inpatient   Age/Gender 79 year old female MRN [id number]   Location [facility] Attending Jacy Peoples DO   Hosp Day # 2 PCP DIAN Hernandez       Anesthesia Post-op Note    ESOPHAGOGASTRODUODENOSCOPY (EGD)    Procedure Summary       Date: 06/23/24 Room / Location:  ENDOSCOPY 03 / [facility] END[facility]COPY    Anesthesia Start: 0950 Anesthesia Stop: 1005    Procedure: ESOPHAGOGASTRODUODENOSCOPY (EGD) Diagnosis:       Anemia, unspecified type      (hiatal hernia)    Surgeons: Koby Ortiz DO Anesthesiologist: Svetlana Hummel MD    Anesthesia Type: MAC ASA Status: 3            Anesthesia Type: MAC    Vitals Value Taken Time   /66 06/23/24 1005   Temp  06/23/24 1005   Pulse 74 06/23/24 1005   Resp 16 06/23/24 1005   SpO2 97 06/23/24 1005       Patient Location: Endoscopy    Anesthesia Type: MAC    Airway Patency: patent    Postop Pain Control: adequate    Mental Status: preanesthetic baseline    Nausea/Vomiting: none    Cardiopulmonary/Hydration status: stable euvolemic    Complications: no apparent anesthesia related complications    Postop vital signs: stable    Dental Exam: Unchanged from Preop    Patient to be discharged from PACU when criteria met.

## 2024-06-23 NOTE — OCCUPATIONAL THERAPY NOTE
OCCUPATIONAL THERAPY EVALUATION - INPATIENT     Room Number: 506/506-A  Evaluation Date: 6/23/2024  Type of Evaluation: Initial  Presenting Problem: anemia, weakness    Physician Order: IP Consult to Occupational Therapy  Reason for Therapy: ADL/IADL Dysfunction and Discharge Planning    OCCUPATIONAL THERAPY ASSESSMENT   Patient is currently functioning below baseline with standing ADL and mobility. Prior to admission, patient's baseline is assist of one for ADL, short distance ambulation via RW.  Patient is currently unable to transfer or stand for ADL performance as a result of the following impairments: BLE pain, deconditioning.  Occupational Therapy will continue to follow for duration of hospitalization.      Patient will benefit from continued skilled OT Services to promote return to prior level of function and safety with continuous assistance and gradual rehabilitative therapy     Engaged in discussion RE pain management with pt, family, RN, and hospitalist. There is some confusion regarding home pain medication, and pt currently only has tylonol ordered. Family reports to therapist that pt takes pain medication daily.     Pt would benefit from reassessment for discharge planning after medication issues are resolved. Pt has not stood since admission and is deconditioned. It is unclear whether pt would be able to transfer to bedside commode or transport chair if pain were manageable.    History Related to Current Admission: Patient is a 79 year old female admitted on 6/21/2024 with Presenting Problem: anemia, weakness. Co-Morbidities : DM, CKD, bells palsy, bladder CA s/p urostomy    WEIGHT BEARING RESTRICTION                   Recommendations for nursing staff:   Transfers: total lift vs chair position  Toileting location: bed level    EVALUATION SESSION:  Patient Start of Session: semi supine, family present  FUNCTIONAL TRANSFER ASSESSMENT  Sit to Stand: Edge of Bed (unable to complete)    BED  MOBILITY  Supine to Sit : Supervision  Scooting: supervision, increased time    BALANCE ASSESSMENT     FUNCTIONAL ADL ASSESSMENT  Eating: Independent  LB Dressing Seated: Dependent      ACTIVITY TOLERANCE: vitals wfl    COGNITION  Following Commands:  follows one step commands consistently    Upper Extremity   ROM: within functional limits except for the following:  R shoulder 1/4 range, L shoulder 1/3 range  Strength: within functional limits except for the following;  B shoulders 2-/5, B elbows 4/5  Coordination  Gross motor: intact  Fine motor: impaired  Sensation: Light touch:  impaired    EDUCATION PROVIDED  Patient: Role of Occupational Therapy; Plan of Care; Discharge Recommendations; DME Recommendations; Functional Transfer Techniques; UE HEP for ROM (pain management, benefits of OOB activity)  Patient's Response to Education: Verbalized Understanding    Equipment used: RW  Would benefit from additional trial      Therapist comments: ADL/mobility as noted. Phone  used. Pt reluctant to participate secondary to pain/anticipation of pain. Extended time spent on education RE purpose of assessment and benefits of OOB activity. Pt agreeable to evaluation with encouragement. Supervision to EOB. Independent sitting balance. Engaged in BUE/BLE AROM EOB. Pt attempts sit to stand from EOB x3 via RW. Unable to clear buttocks from bed secondary to BLE pain. Pt and family reporting that pt has chronic BLE pain, but this is worse than it is at home. Pt has been toileting bed level and has not been out of bed since admission due to BLE pain.     Patient End of Session: Needs met;Call light within reach;RN aware of session/findings;All patient questions and concerns addressed;Family present (seated EOB)    OCCUPATIONAL PROFILE    HOME SITUATION  Type of Home: House  Home Layout: Two level;Able to live on main level  Lives With: Daughter;Son    Toilet and Equipment: Standard height toilet;3-in-1 commode  Shower/Tub  and Equipment: Walk-in shower;Shower chair                     Prior Level of Function: Pt/family report that pt has assist for dressing, bathing, and toileting from family at baseline. She ambulates short distances into the bathroom. She has a transport chair for longer distances, as well as a commode if needed.       SUBJECTIVE   Through , pt states \"You don't understand it hurts so bad.\" When trying to stand. Pain increases as pt begins weight bearing in preparation for sit to stand.    PAIN ASSESSMENT  Rating: Unable to rate  Location: BLE  Management Techniques: Activity promotion;Repositioning;Nurse notified (medication discussed with RN and hospitalist)    OBJECTIVE  Precautions:  needed  Fall Risk: High fall risk      ASSESSMENTS    AM-PAC ‘6-Clicks’ Inpatient Daily Activity Short Form  -   Putting on and taking off regular lower body clothing?: Total  -   Bathing (including washing, rinsing, drying)?: A Lot  -   Toileting, which includes using toilet, bedpan or urinal? : Total  -   Putting on and taking off regular upper body clothing?: A Lot  -   Taking care of personal grooming such as brushing teeth?: A Little  -   Eating meals?: None    AM-PAC Score:  Score: 13  Approx Degree of Impairment: 63.03%  Standardized Score (AM-PAC Scale): 32.03    ADDITIONAL TESTS     NEUROLOGICAL FINDINGS      COGNITION ASSESSMENTS       PLAN  OT Treatment Plan: Balance activities;Energy conservation/work simplification techniques;ADL training;Functional transfer training;UE strengthening/ROM;Endurance training;Patient/Family education;Patient/Family training;Equipment eval/education;Compensatory technique education  Rehab Potential : Good  Frequency: 3-5x/week  Number of Visits to Meet Established Goals: 5    ADL Goals   Patient will perform toileting: with mod assist    Functional Transfer Goals  Patient will transfer from sit to stand:  with min assist  Patient will transfer to bedside commode:  with  min assist    UE Exercise Program Goal  Patient will be supervision with bilateral AROM HEP (home exercise program).    Additional Goals  Pt will stand 2 minutes with CGA for toileting task      Patient Evaluation Complexity Level:   Occupational Profile/Medical History LOW - Brief history including review of medical or therapy records    Specific performance deficits impacting engagement in ADL/IADL LOW  1 - 3 performance deficits    Client Assessment/Performance Deficits LOW - No comorbidities nor modifications of tasks    Clinical Decision Making LOW - Analysis of occupational profile, problem-focused assessments, limited treatment options    Overall Complexity LOW     OT Session Time: 45 minutes  Self-Care Home Management:  minutes  Therapeutic Activity: 15 minutes  Neuromuscular Re-education:  minutes  Therapeutic Exercise: 15 minutes  Orthotic Management and Training:  minutes

## 2024-06-23 NOTE — PROGRESS NOTES
[facility]  [facility]  Cardiology Progress Note    Anya Bautista Patient Status:  Inpatient    1945 MRN [id number]   Location [facility] 5NW-A Attending Jacy Peoples, DO   Hosp Day # 2 PCP Lubna Arshad, DIAN       Subjective: No chest pain, dyspnea, abdominal pain or lower extremity pain/swelling.  Able to completed EGD only and refused prep for colonoscopy.    Objective:   Temp: 97.6 °F (36.4 °C)  Pulse: 59  Resp: 18  BP: 148/53    Intake/Output:     Intake/Output Summary (Last 24 hours) at 2024 1245  Last data filed at 2024 1020  Gross per 24 hour   Intake 2260 ml   Output 550 ml   Net 1710 ml       Last 3 Weights   24 0747 150 lb (68 kg)   24 1126 150 lb (68 kg)   19 0436 134 lb 4.8 oz (60.9 kg)   19 2326 134 lb 4.8 oz (60.9 kg)   19 1859 141 lb (64 kg)       Tele: SB-NSR with no tele alarms reported    Physical Exam:     General: Alert and oriented x 3. No apparent distress. No respiratory or constitutional distress.  HEENT: Normocephalic, anicteric sclera, neck supple.  Neck: No JVD, carotids 2+, no bruits.  Cardiac: Regular rate and rhythm. S1, S2 normal. No murmur, pericardial rub, S3.  Lungs: Clear without wheezes, rales, rhonchi or dullness.  Normal excursions and effort.  Abdomen: Soft, non-tender.   Extremities: Without clubbing, cyanosis or edema.  Peripheral pulses are 2+.  Neurologic: Alert and oriented, normal affect.  Skin: Warm and dry.     Laboratory/Data:    Labs:         Recent Labs   Lab 24  1134 24  1728 24  0112 24  0707 24  0807   WBC 6.5  --   --  6.5 6.9   HGB 5.6* 6.7* 7.9* 8.2* 8.5*   MCV 77.4*  --   --  79.4* 82.4   .0  --   --  273.0 323.0       Recent Labs   Lab 24  1134 24  0707    142   K 4.4 3.9    114*   CO2 20.0* 20.0*   BUN 31* 19   CREATSERUM 0.90 0.52*   CA 8.6 8.3*   * 76       Recent Labs   Lab 24  1134  06/21/24  1157   ALT 15  --    AST 12*  --    ALB 2.9*  --    LDH  --  180       No results for input(s): \"TROP\", \"TROPHS\", \"CK\" in the last 168 hours.    Allergies:   No Known Allergies    Medications:  Current Facility-Administered Medications   Medication Dose Route Frequency    gabapentin (Neurontin) cap 100 mg  100 mg Oral TID    acetaminophen (Tylenol Extra Strength) tab 500 mg  500 mg Oral Q4H PRN    sodium chloride 0.9% infusion  60 mL/hr Intravenous Continuous    tamsulosin (Flomax) cap 0.4 mg  0.4 mg Oral Daily @ 0700    melatonin tab 3 mg  3 mg Oral Nightly PRN    ondansetron (Zofran) 4 MG/2ML injection 4 mg  4 mg Intravenous Q6H PRN    polyethylene glycol (PEG 3350) (Miralax) 17 g oral packet 17 g  17 g Oral Daily PRN    sennosides (Senokot) tab 17.2 mg  17.2 mg Oral Nightly PRN    bisacodyl (Dulcolax) 10 MG rectal suppository 10 mg  10 mg Rectal Daily PRN    fleet enema (Fleet) 7-19 GM/118ML rectal enema 133 mL  1 enema Rectal Once PRN    sodium ferric gluconate (Ferrlecit) 125 mg in sodium chloride 0.9% 100mL IVPB premix  125 mg Intravenous Daily    folic acid (Folvite) tab 1 mg  1 mg Oral Daily    pantoprazole (Protonix) DR tab 40 mg  40 mg Oral QAM AC     Imaging:  Echo 6/22/2024: Conclusions:   1. Left ventricle: The cavity size was normal. Wall thickness was mildly to moderately increased. Systolic function was normal. The estimated ejection fraction was 60-65%, by visual assessment. Diastolic dysfunction present but unable to assess severity. The ratio of systolic to diastolic pulmonary vein flow was reduced (diastolic predominant). Doppler parameters are consistent with elevated mean left atrial filling pressure.   2. Left ventricle: There is moderate hypokinesis of the apex.   3. Right ventricle: The cavity size was normal. Systolic function was normal.   4. Ventricular septum: Thickness was mildly increased. The outflow septum had a sigmoid appearance.   5. Left atrium: The left atrial volume  was markedly increased.   6. Aortic valve: The valve was probably trileaflet. The leaflets were moderately thickened and moderately calcified. Cusp separation was    reduced. Transvalvular velocity was increased, due to stenosis. The findings were consistent with moderate stenosis. The peak systolic velocity was 3.54m/sec. The mean systolic gradient was 24mm Hg. The valve area (VTI) was 1.09cm^2. The valve area (VTI) index was 0.62cm^2/m^2.   7. Mitral valve: The annulus was moderately calcified. The leaflets were mildly to moderately thickened and mildly calcified. There was mild to moderate regurgitation.   8. Tricuspid valve: There was moderate regurgitation.   9. Pulmonary arteries: Systolic pressure was moderately increased, in the range of 55mm Hg to 60mm Hg.   Impressions:  This study is compared with previous dated 11/03/2019: Progession of aortic stenosis from mild to now moderate range of stenosis.     Assessment:  Symptomatic anemia with hemoglobin 5.6 in the emergency room.  GI on consult  Cardiac clearance prior to the scope and regarding dual antiplatelet therapy management  Worsening generalized weakness with dizziness and lightheadedness due to symptomatic anemia.  Peripheral arterial disease - possibly on on Plavix and aspirin 81 mg p.o. daily PTA  Chronic anemia with worsening hemoglobin -peripheral etiologies of anemia MGUS with chronic kidney disease and iron deficiency in the past -last upper and lower scopes 2019 but no bleeders.  Systemic hypertension controlled with beta-blocker and lisinopril as outpatient but with anemia BP lower  aortic stenosis - progressed from mild to moderate based on echo today  Dyslipidemia on statin.  GERD.  History of lower extremity wounds due to peripheral arterial disease 2019.  Physical deconditioning    Plan:  -tele monitoring  -discussed with daughter today and she does not believe mother was on any blood thinning medications  -thus recommend starting only  aspirin 81 mg daily and follow-up of PAD with her primary cardiologist Dr. Kessler in the [facility] clinic as an outpatient  -Resume just amlodipine & BB, statin upon discharge and can follow BP before resuming ACE-I/hydrochlorothiazide    D/w patient, called daughter and bedside RN    Jose Luis Marcial MD  6/23/2024  12:45 PM

## 2024-06-23 NOTE — PROGRESS NOTES
06/22/24 2203   Provider Notification   Reason for Communication Review case   Provider Name Other (comment)  (Dr Ortiz)   Method of Communication Secure Messaging   Response No new orders   Notification Time 2203 2203: Patient is scheduled for EGD/colonoscopy tomorrow morning. Patient is now refusing to drink prep. has only drank 1/3rd of bottle. She had large BM but stool is still soft. I educated her on need for scope but she is still refusing. She is agreeable to EGD.    Dr Ortiz 2205: Ok will proceed with egd only

## 2024-06-24 VITALS
HEIGHT: 65 IN | DIASTOLIC BLOOD PRESSURE: 49 MMHG | WEIGHT: 150 LBS | TEMPERATURE: 98 F | HEART RATE: 59 BPM | OXYGEN SATURATION: 96 % | RESPIRATION RATE: 17 BRPM | SYSTOLIC BLOOD PRESSURE: 115 MMHG | BODY MASS INDEX: 24.99 KG/M2

## 2024-06-24 PROCEDURE — 99239 HOSP IP/OBS DSCHRG MGMT >30: CPT | Performed by: INTERNAL MEDICINE

## 2024-06-24 RX ORDER — OXYCODONE HYDROCHLORIDE AND ACETAMINOPHEN 5; 325 MG/1; MG/1
1 TABLET ORAL EVERY 8 HOURS PRN
Qty: 20 TABLET | Refills: 0 | Status: SHIPPED | OUTPATIENT
Start: 2024-06-24

## 2024-06-24 RX ORDER — ATORVASTATIN CALCIUM 80 MG/1
80 TABLET, FILM COATED ORAL NIGHTLY
Status: DISCONTINUED | OUTPATIENT
Start: 2024-06-24 | End: 2024-06-24

## 2024-06-24 RX ORDER — AMLODIPINE BESYLATE 2.5 MG/1
2.5 TABLET ORAL DAILY
Qty: 30 TABLET | Refills: 3 | Status: SHIPPED | OUTPATIENT
Start: 2024-06-24

## 2024-06-24 RX ORDER — AMLODIPINE BESYLATE 2.5 MG/1
2.5 TABLET ORAL DAILY
Status: DISCONTINUED | OUTPATIENT
Start: 2024-06-24 | End: 2024-06-24

## 2024-06-24 NOTE — PAYOR COMM NOTE
--------------  ADMISSION REVIEW     Payor: KIKI ESPINAL [facility]  Subscriber #:  [id number]  Authorization Number: [id number]    Admit date: 6/21/24  Admit time:  4:47 PM     Patient Seen in: [facility] Emergency Department    History     Stated Complaint: headache , numbness since 2 weeks    79-year-old male comes to the hospital complaint of having difficulty with progressive weakness over the past 2 weeks.  She will get occasional headaches.  She denies any loss of sensation at this time.  Her weakness is in general.  She is having bodyaches at times headaches as well.  She is denying any known fevers.  She is having no nausea or vomiting at this time.  Denies any diarrhea.  She.  She denying any urinary symptoms.  She has no runny nose, sneeze or cough.  She denies any abdominal pains.  She is denying any other complaints.  She does have a history of having diabetes, heart disease and anemia in the past.    Objective:   Past Medical History:    Essential hypertension    Hyperlipidemia     No pertinent past surgical history.     Physical Exam     ED Triage Vitals   BP 06/21/24 1115 92/32   Pulse 06/21/24 1115 56   Resp 06/21/24 1115 (!) 9   Temp 06/21/24 1126 97.2 °F (36.2 °C)   Temp src --    SpO2 06/21/24 1115 100 %   O2 Device 06/21/24 1126 None (Room air)     Current Vitals:   Vital Signs  BP: 118/41  Pulse: 57  Resp: 24  Temp: 97.6 °F (36.4 °C)  MAP (mmHg): (!) 63    Physical Exam    HEENT : NCAT, EOMI, PEERL,  neck supple, no JVD, trachea midline, No LAD  Heart: S1S2 normal.  Systolic ejection murmur is noted, regular rate and rhythm  Lungs: Clear to auscultation bilaterally  Abdomen: Soft nontender nondistended normal active bowel sounds without rebound, guarding or masses noted  Back nontender without CVA tenderness  Extremity no clubbing, cyanosis or edema noted.  Full range of motion noted without tenderness  Neuro: No focal deficits noted  Rectal exam is heme-negative.    Labs Reviewed   COMP METABOLIC  PANEL (14) - Abnormal; Notable for the following components:       Result Value    Glucose 146 (*)     CO2 20.0 (*)     BUN 31 (*)     Calculated Osmolality 297 (*)     AST 12 (*)     Albumin 2.9 (*)     A/G Ratio 0.8 (*)     All other components within normal limits   URINALYSIS WITH CULTURE REFLEX - Abnormal; Notable for the following components:    Nitrite Urine 2+ (*)     Squamous Epi. Cells Few (*)     All other components within normal limits   CBC W/ DIFFERENTIAL - Abnormal; Notable for the following components:    RBC 2.57 (*)     HGB 5.6 (*)     HCT 19.9 (*)     MCV 77.4 (*)     MCH 21.8 (*)     MCHC 28.1 (*)     Lymphocyte Absolute 0.93 (*)     All other components within normal limits   LACTIC ACID, PLASMA - Normal   IRON AND TIBC   LDH   HAPTOGLOBIN   RETICULOCYTE COUNT   TYPE AND SCREEN   PREPARE RBC   BLOOD CULTURE   BLOOD CULTURE   URINE CULTURE, ROUTINE     EKG 55 Sinus Rhythm  Reading: , , patient sinus bradycardia with right bundle branch block noted with 11 to her fascicular block.    Time: 06/21 1444  Comment: While here the patient's urinalysis was normal.  The patient's white count was 6.5 and hemoglobin of 5.6.  Patient CO2 was 20 and BUN was 31.  Patient's COVID, influenza and RSV were negative.  The patient had chest x-ray that upper-central which showed no acute cardiopulmonary process.  Read the radiology report as well per the patient CT brain reviewed as well.  Patient had blood products ordered and patient be admitted to the hospital with GI notified.     CT BRAIN OR HEAD   1. No acute intracranial hemorrhage or hydrocephalus. 2. Mild age indeterminate small vessel ischemic disease. If there is clinical concern for acute ischemia/infarction, an MRI of the brain would be recommended for further evaluation. 3. Coarse calcification at the left temporal occipital junction is nonspecific but could represent chronic neurocysticercosis among other etiologies.       XR CHEST AP  PORTABLE    No evidence of active cardiopulmonary disease.       Medications   sodium chloride 0.9% infusion (125 mL/hr Intravenous Handoff 6/21/24 1433)     MDM      Differential diagnosis does include GI bleed, iron deficient anemia, intracranial disorder but not limited these.  I believe the patient does have's symptomatic anemia.  She is heme-negative when her stool was checked.  I spoke with the hospitalist as well as GI the patient be admitted for further management.  Patient is receiving blood products while here.    Disposition and Plan     Clinical Impression:  1. Weakness generalized    2. Symptomatic anemia         History and Physical       Anya Bautista is an 79 year old female with pmhx of HTN, HLD, anemia who presents with complaint of progressive generalized weakness and fatigue. She and her family at bedside report that she has been having ongoing lightheadedness/dizziness, tingling in face, BL/UE over the last few weeks associated with generalized weakness, fatigue. No generalized abdominal pain (although, she thinks she may have some very mild LLQ pain over last several days), or  dark colored stools, n/v/d, fevers/chills, melena/hematochezia, hematemesis, easy bruising, or rashes. She has history of iron deficiency anemia and has required iron infusion and blood transfusions in the past. They report that she had endsocopy done a few years ago in similar situation and report \"normal\" results with no e/o bleeding found at that time.      /60   Pulse 54   Temp 97.6 °F (36.4 °C)   Resp 26   Ht 5' 5\" (1.651 m)   Wt 150 lb (68 kg)   SpO2 100%   BMI 24.96 kg/m²   General: No acute distress, Alert; frail/elderly appearing, pale  Respiratory: No rhonchi, no wheezes  Cardiovascular: S1, S2. Regular rate and rhythm  Abdomen: Soft, Non-tender, non-distended, positive bowel sounds  Neuro: No new focal deficits  Extremities: No edema     Assessment & Plan:  #Acute on chronic blood loss anemia,  unclear etiology. HDS on presentation suggesting chronic, microscopic losses  #Severe iron deficiency  - iron studies ordered and reviewed  - IV iron while inpatient   - LDH, haptoglobin, reticulocyte count ordered  - transfuse 2u PRBC's in ED  - PO PPI daily  - hold DAPT > cardiology consulted per GI recommendation   - GI consulted from ED > plan for endoscopy tomorrow     #HTN  - hold lisinopril, metoprolol     #HLD  - statin     #PAD  - cardiology consulted for plavix review  - holding DAPT  - statin     #Hx of leukoctyoclastic vasculitis      #MGUS        GI:    HPI: This is a 78 yo woman with a history of HTN, PAD and on Plavix, who had been in her usual state of health until the past few weeks when she started to feel a sense of progressive weakness, fatigue, headaches, dizziness and dyspnea on exertion. She reports no nausea/vomiting or abdominal pain.  While she reports no regular blood per rectum, she noted an episode of gross blood in the toilet after a normal bowel movement, about 2 weeks ago. She reports that she presented to the ER because of her weakness and headache, where she was noted to have a hgb of 5.6.  She reports no regular nsaids.  She does not recall having had a prior colonoscopy, but is documented to have undergone a complete colonoscopy and EGD by Dr. AIDAN Hernandez in 11/2019 for symptoms of hematochezia and anemia.        Impression: This is a 78 yo woman who is presenting with regard to progressive symptomatic anemia over the past few weeks.  Her baseline hgb tends to be 8-9, but she has been noted to drop down to 6 in the past.  She has undergone prior EGD and Colonoscopy.  She was noted to have some small polyps at that time, which were not resected due to being on anti-platelet therapy at the time.  It is unlikely that these small findings are now causing her current symptoms.  However, because of her recurrent anemia, she warrants a repeat evaluation.     Recommendations:   1) PRBC's  to achieve Hgb > 7  2) Pantoprazole 40 mg po daily   3) Regular diet  4) Once adequately volume resuscitated, would pursue EGD and Colonoscopy  5) Would consult Cardiology to discuss holding Plavix    CARDS:    Reason for Consultation:  Profound anemia hemoglobin 5.6, symptomatic  On ???dual antiplatelet therapy for history of peripheral angioplasty        History of Present Illness:  Anya Bautista is a a(n) 79 year old female, known to my colleague Dr. Jeyson Kessler with peripheral vascular disease and history of peripheral procedures for lower extremity wounds with cellulitis 2019, it is not clear if on dual antiplatelet therapy for history of PTA, brought by family to ER with headache and dizziness and generalized weakness with body aches over last 2 weeks.  No fever.  No recent respiratory infection.  No chest pain.  No chest symptoms in general more body aches.  No GI symptoms.  No abdominal pain.  No nausea vomiting or diarrhea.     Hemoglobin 5.6 in the emergency room.     Patient on Plavix and aspirin and statin for history of peripheral angioplasties with Dr. Kessler.     Upon questioning worsening generalized weakness with dizziness and anemia are main reason for presentation.     In ER she was found to have profound anemia with hemoglobin but denies bloody stool or blood in the urine.      She has history of anemia requiring blood transfusion in the past.  Last GI scopes were unremarkable with no active bleeding per family in 2019.     Patient is MGUS history and chronic kidney disease with history of iron deficiency.     Important labs: Hemoglobin 5.6 glucose 146 creatinine 0.9 potassium 4.4 sodium 139 platelets 298.  Normal liver enzymes.     Chest x-ray with no acute acute cardiopulmonary changes.     Brain CT with no acute changes.     Telemetry: Stable sinus.     Nuclear stress test -no ischemia with LVEF of 66% in November 2019.     Echo Doppler -November 2019 - LVEF 65 to 70% with no wall motion  abnormalities and slow relaxation with mild stenosis of aortic valve with mean gradient of 18 mmHg and calcified mitral valve annulus with 1+ MR and mildly enlarged both atria.  No LVH.     EKG: Sinus 55 bpm with right bundle branch block and left anterior fascicular block with secondary nonspecific changes but no acute ischemia.        Impression:  Symptomatic anemia with hemoglobin 5.6 in the emergency room.  No acute GI symptoms.  No obvious blood in stool or urine.  Patient's family.  Cardiac clearance prior to the scope and regarding dual antiplatelet therapy management  Worsening generalized weakness with dizziness and lightheadedness due to symptomatic anemia.  Peripheral arterial disease -on Plavix and aspirin 81 mg p.o. daily.  Chronic anemia with worsening hemoglobin -peripheral etiologies of anemia MGUS with chronic kidney disease and iron deficiency in the past -last upper and lower scopes 2019 but no bleeders.  Systemic hypertension controlled with beta-blocker and lisinopril but anemia also lower the pressure.  Mild aortic stenosis.  Dyslipidemia on statin.  GERD.  History of lower extremity wounds due to peripheral arterial disease 2019.  Physical deconditioning.     Plan:  -Telemetry monitoring  -Hold Plavix and aspirin from home dose no recent procedures -which she was even taking these medications prior to admission -it is not clear at present time  -Echo Doppler to follow and mild aortic stenosis after 5 years and assess LV systolic function for fluid management -last echo with normal heart pump function 2019 and nuclear stress test was negative for ischemia at the time.  -No need for any stress test -no angina and this is admission with symptomatic anemia  -For this reason patient is cleared for GI scope  -We may resume 1 or 2 antiplatelet agents after scope depends on findings-no urgency with going back on antiplatelet agent -GI workup first  -Follow hemoglobin after blood transfusion  -Iron  status -expecting low iron -normal platelet count  -Agree with gentle IV fluid hydration but not vigorous -to avoid iatrogenic fluid overload especially with mild aortic stenosis known from the past -will lower IV fluids by 50% especially with blood products-there is no CHF evaluation and avoid iatrogenic fluid overload -cut fluids from 125 cc/h down to 60 cc/h  -I called daughter and she helped with translation [place] citizen English -but was not clear if patient takes Plavix and aspirin.  Somebody is bringing medication bottles -our recommendation is if patient did not take aspirin and / or Plavix prior to admission there will be nothing to hold but if she has been taking dual antiplatelet therapy prior to admission it can be easily held with no recent cardiovascular procedures since 2019 6/22:  HOSPITALIST:    Vital signs:  Temp:  [97.2 °F (36.2 °C)-98 °F (36.7 °C)] 98 °F (36.7 °C)  Pulse:  [53-65] 64  Resp:  [9-34] 17  BP: ()/(32-82) 129/47  SpO2:  [93 %-100 %] 93 %     Physical Exam:    General: No acute distress; Alert; frail/elderly appearing, pale   Respiratory: No wheezes, no rhonchi  Cardiovascular: S1, S2, regular rate and rhythm  Abdomen: Soft, Non-tender, non-distended, positive bowel sounds  Neuro: No new focal deficits.   Extremities: No edema     Lab 06/21/24  1134 06/21/24  1728 06/22/24  0112   WBC 6.5  --   --    HGB 5.6* 6.7* 7.9*     Scheduled Medications[]Expand by Default    tamsulosin  0.4 mg Oral Daily @ 0700    sodium ferric gluconate  125 mg Intravenous Daily    folic acid  1 mg Oral Daily    pantoprazole  40 mg Oral QAM AC                  Assessment & Plan:  #Acute on chronic blood loss anemia, unclear etiology. HDS on presentation suggesting chronic, microscopic losses  #Severe iron deficiency  - iron studies ordered and reviewed  - IV iron while inpatient   - LDH, haptoglobin, reticulocyte count ordered  - transfuse 2u PRBC's in ED  - PO PPI daily  - hold DAPT > cardiology consulted  per GI recommendation   - GI consulted from ED > plan for endoscopy tomorrow     #HTN  - hold lisinopril, metoprolol     #HLD  - statin     #PAD  - cardiology consulted for plavix review  - holding DAPT  - statin  - echo ordered      #Hx of leukoctyoclastic vasculitis      #MGUS      GI:    Hgb stable. No melena or hematochezia. Hemodynamically stable. Recommend EGD/Colonoscopy tomorrow to evaluate for causes of anemia. Continue PPI.     CARDS:    Assessment:  Symptomatic anemia with hemoglobin 5.6 in the emergency room.  GI on consult  Cardiac clearance prior to the scope and regarding dual antiplatelet therapy management  Worsening generalized weakness with dizziness and lightheadedness due to symptomatic anemia.  Peripheral arterial disease - possibly on on Plavix and aspirin 81 mg p.o. daily PTA  Chronic anemia with worsening hemoglobin -peripheral etiologies of anemia MGUS with chronic kidney disease and iron deficiency in the past -last upper and lower scopes 2019 but no bleeders.  Systemic hypertension controlled with beta-blocker and lisinopril as outpatient but with anemia BP lower  aortic stenosis - progressed from mild to moderate based on echo today  Dyslipidemia on statin.  GERD.  History of lower extremity wounds due to peripheral arterial disease 2019.  Physical deconditioning     Plan:  -tele monitoring  -acceptable cardiac risk for planned EGD/colonoscopy per GI service with   -await GI procedure results and will start either aspirin 81 mg daily or plavix 75 mg daily; further evaluation and follow-up of PAD with her primary cardiologist Dr. Kessler in the [facility] clinic as an outpatient  -normotensive and off anti-HTN meds  -resume statin when OK by all services    6/23:    HOSPITALIST:    Resting in bed, declined prep last night. Waiting EGD later this morning. Complaining of chronic pain in her legs       Lab 06/21/24  1134 06/21/24  1728 06/22/24  0112 06/22/24  0707   WBC 6.5  --   --   6.5   HGB 5.6* 6.7* 7.9* 8.2*   MCV 77.4*  --   --  79.4*   .0  --   --  273.0               Hospital Encounter on 06/21/24   1. Urine Culture, Routine     Status: Abnormal     Collection Time: 06/21/24 11:58 AM     Specimen: Urine, clean catch   Result Value Ref Range     Urine Culture >100,000 CFU/ML Escherichia coli (A)      Scheduled Medications[]Expand by Default    gabapentin  100 mg Oral TID    tamsulosin  0.4 mg Oral Daily @ 0700    sodium ferric gluconate  125 mg Intravenous Daily    folic acid  1 mg Oral Daily    pantoprazole  40 mg Oral QAM AC                  Assessment & Plan:  #Acute on chronic blood loss anemia, unclear etiology. HDS on presentation suggesting chronic, microscopic losses  #Severe iron deficiency  - iron studies ordered and reviewed  - IV iron while inpatient   - transfused 2u PRBC's in ED  - PO PPI daily  - hold DAPT > cardiology consulted per GI recommendation   - GI consulted from ED > plan for endoscopy this morning     #HTN  - hold lisinopril, metoprolol     #HLD  - statin     #PAD  - cardiology consulted for plavix review  - holding DAPT  - statin  - echo unremarkable      #Hx of leukoctyoclastic vasculitis      #MGUS     #Chronic pain  #Inability to ambulate  - norco  - PT/OT > NED      GI:     PREOPERATIVE DIAGNOSIS/INDICATION: Anemia  POSTOPERTATIVE DIAGNOSIS: See Impression  PROCEDURE PERFORMED:  EGD ]  Esophagus: The esophagus was normal. The esophagogastric junction was 40 cm from the incisors. The squamocolumnar junction was 40 cm from the incisors and regular.      Stomach: 3 cm hiatal hernia. The gastric mucosa was normal.      Duodenum: The duodenal bulb was normal. The examined descending duodenum was normal.       IMPRESSION:                 1. Small hiatal hernia.   RECOMMENDATIONS:                1. Patient refusing prep for colonoscopy.                2. Stable hgb and no ongoing GI bleeding.                3. Will schedule close outpatient GI follow up.                 4. Recommend outpatient heme referral.            NURSING:    Ucx (+) for e.coli. 0.9 @60ml/hr. EGD completed. Pt unable to stand, prn medications given. Discharge pending NED placement     MEDICATIONS ADMINISTERED IN LAST 1 DAY:    HYDROcodone-acetaminophen (Norco) 5-325 MG per tab 1 tablet       Date Action Dose Route User    6/24/2024 0506 Given 1 tablet Oral Jeimy Valencia RN    6/23/2024 1501 Given 1 tablet Oral Patty Kasper, SULMA          pantoprazole (Protonix) DR tab 40 mg       Date Action Dose Route User    6/24/2024 0506 Given 40 mg Oral Jeimy Valencia RN          sodium chloride 0.9% infusion       Date Action Dose Route User    6/23/2024 1726 New Bag 60 mL/hr Intravenous Patty Kasper, SULMA          sodium ferric gluconate (Ferrlecit) 125 mg in sodium chloride 0.9% 100mL IVPB premix       Date Action Dose Route User    6/23/2024 1730 New Bag 125 mg Intravenous Patty Kasper, SULMA       Blood Transfusion Record       Product Unit Status Volume Start End            Transfuse RBC       24  [id number]  [id number] Stopped 273.33 mL 06/21/24 1831 06/21/24 2115       24  [id number]  [id number] Stopped 256.25 mL 06/21/24 1319 06/21/24 1504

## 2024-06-24 NOTE — PROGRESS NOTES
Progress Note  Anya Bautista Patient Status:  Inpatient    1945 MRN [id number]   Location [facility] 5NW-A Attending Jacy Peoples, DO   Hosp Day # 3 PCP DIAN Hernandez     Subjective:  No acute events overnight.  Resting in bed this morning, denies any cardiac symptoms at this time.    Objective:  /43 (BP Location: Left arm)   Pulse 59   Temp 98.3 °F (36.8 °C) (Oral)   Resp 17   Ht 5' 5\" (1.651 m)   Wt 150 lb (68 kg)   SpO2 96%   BMI 24.96 kg/m²     Telemetry: SR, SB, HR 50s      Intake/Output:    Intake/Output Summary (Last 24 hours) at 2024 0908  Last data filed at 2024 0600  Gross per 24 hour   Intake 1744 ml   Output 650 ml   Net 1094 ml       Last 3 Weights   24 0747 150 lb (68 kg)   24 1126 150 lb (68 kg)   19 0436 134 lb 4.8 oz (60.9 kg)   19 2326 134 lb 4.8 oz (60.9 kg)   19 1859 141 lb (64 kg)       Labs:  Recent Labs   Lab 24  1134 24  0707   * 76   BUN 31* 19   CREATSERUM 0.90 0.52*   EGFRCR 65 94   CA 8.6 8.3*    142   K 4.4 3.9    114*   CO2 20.0* 20.0*     Recent Labs   Lab 24  1134 24  1728 24  0112 24  0707 24  0807   RBC 2.57*  --   --  3.39* 3.58*   HGB 5.6*   < > 7.9* 8.2* 8.5*   HCT 19.9*  --   --  26.9* 29.5*   MCV 77.4*  --   --  79.4* 82.4   MCH 21.8*  --   --  24.2* 23.7*   MCHC 28.1*  --   --  30.5* 28.8*   RDW 20.4  --   --  18.7 19.5   NEPRELIM 4.98  --   --  4.96 5.18   WBC 6.5  --   --  6.5 6.9   .0  --   --  273.0 323.0    < > = values in this interval not displayed.         No results for input(s): \"TROP\", \"TROPHS\", \"CK\" in the last 168 hours.    Review of Systems   Constitutional: Positive for malaise/fatigue.   Cardiovascular: Negative.    Respiratory: Negative.         Physical Exam:    Gen: alert, oriented x 3, NAD  Heent: pupils equal, reactive. Mucous membranes moist.   Neck: no jvd  Cardiac: regular rate and rhythm, normal S1,S2,  2/6 systolic murmur, no gallop or rub   Lungs: CTA  Abd: soft, NT/ND +bs  Ext: no edema  Skin: Warm, dry, pale   Neuro: No focal deficits        Medications:     gabapentin  100 mg Oral TID    tamsulosin  0.4 mg Oral Daily @ 0700    sodium ferric gluconate  125 mg Intravenous Daily    folic acid  1 mg Oral Daily    pantoprazole  40 mg Oral QAM AC      sodium chloride 60 mL/hr (06/23/24 1726)       Assessment:  Symptomatic anemia, hgb 5.6 on admit  S/P EGD 6/23/24, showed no active bleeding, small hiatal hernia, refused prep for colonoscopy, plan to follow up OP with GI.  Hgb stable at 8.5 today.  Generalized weakness, dizziness d/t above  PAD  Historically on DAPT (plavix, asa)   Hx of chronic anemia (baseline hgb ~8.0)  Iron serum 9, iron sat 2  On replacement with Ferrlecit    Mod aortic stenosis  Echo 6/22/24 LVEF 60-65%, mod hypokinesis of the apex, mild-mod MR, mod aortic stenosis with mean gradient 24 mmHg, mod TR, PASP 55-60 mmHg  PHTN, PASP 55-60 mmHg  HTN-BP soft, holding meds  Historically on lopressor, lisinopril    HLP-on statin   Hx of lower extremities wounds d/t PAD  Deconditioning     Plan:  Appears compensated cardiac status.  Continue with only low dose asa upon dc and follow up with Dr Hendricks OP for further management.  Hgb remains stable, continue iron replacement with sev deficiency.  BP still soft with slow HR 50s-continue low dose lopressor and amlodipine upon dc, will follow up OP and resume additional meds as BP permits.  Continue statin.   Discharge planning in progress, ok with cardiology, will schedule OP follow up with Dr Kessler in 1-2 weeks.       Plan of care discussed with patient, RN.    Adalgisa Dinero, DIAN  6/24/2024  9:08 AM  [phone]      Patient seen and examined independently.  Note reviewed and labs reviewed. Agree with above assessment and plan.  79-year-old female who presented with symptomatic anemia with a hemoglobin of 5.6 on arrival.  She underwent endoscopy via  GI service on 6/23 with no active bleeding noted.  She has underlying moderate aortic valve stenosis.  Would recommend continuation of aspirin 81 mg daily at discharge.  Cardiology service will follow peripherally.  Plan outpatient follow-up for further management.        Nito Coley DO  Cardiologist  [facility]  6/24/2024 11:57 AM      Note to the patient: The 21st Century Cures Act makes medical notes like these available to patients in the interest of transparency. However, be advised that this is a medical document. It is intended as peer to peer communication. It is written in medical language and may contain abbreviations or verbiage that are unfamiliar. It may appear blunt or direct. Medical documents are intended to carry relevant information, facts as evident, and clinical opinion of the practitioner.     Disclaimer: Components of this note were documented using voice recognition system and are subject to errors not corrected at proofreading. Contact the author of this note for any clarifications.

## 2024-06-24 NOTE — PHYSICAL THERAPY NOTE
PHYSICAL THERAPY TREATMENT NOTE - INPATIENT    Room Number: 506/506-A     Session: 1     Number of Visits to Meet Established Goals: 3    Presenting Problem: anemia  Co-Morbidities : DM, CKD, bells palsy, bladder CA s/p urostomy    ASSESSMENT   Patient demonstrates fair progress this session, goals  remain in progress.    Patient continues to function below baseline with transfers and gait.  Contributing factors to remaining limitations include decreased functional strength, pain, impaired standing balance, and decreased muscular endurance.  Next session anticipate patient to progress transfers and gait.  Physical Therapy will continue to follow patient for duration of hospitalization.    Patient continues to benefit from continued skilled PT services: to promote return to prior level of function and safety with continuous assistance and gradual rehabilitative therapy .    PLAN  PT Treatment Plan: Bed mobility;Body mechanics;Endurance;Family education;Gait training;Patient education;Energy conservation;Transfer training;Balance training  Rehab Potential : Fair  Frequency (Obs):  (2-3x/week)    CURRENT GOALS   Goal #1     Goal #2 Patient is able to demonstrate transfers Sit to/from Stand at assistance level: minimum assistance      Goal #3 Patient is able to ambulate 10 feet with assist device: walker - rolling at assistance level: minimum assistance      Goal #4     Goal #5     Goal #6     Goal Comments: Goals established on 6/22/2024 6/24/2024 all goals ongoing     History related to current admission: Patient is a 79 year old female admitted on 6/21/2024 from home for weakness.  Pt diagnosed with +anemia, s/p EGD on 6/23/24.        HOME SITUATION  Type of Home: House   Home Layout: Two level;Able to live on main level     Lives With: Daughter;Son  Patient Owned Equipment: Rolling walker     Prior Level of [place]: Pt reports ambulatory with RW short distances only.  Pt uses transport chair in the  community.  Family able to assist.  Pt reports stays on 1st level of home only.     SUBJECTIVE  Pt states her legs feel weak.    OBJECTIVE  Precautions:  needed    WEIGHT BEARING RESTRICTION                   PAIN ASSESSMENT   Rating: Unable to rate  Location: both legs  Management Techniques: Activity promotion;Repositioning    BALANCE                                                                                                                       Static Sitting: Good  Dynamic Sitting: Good           Static Standing: Poor +  Dynamic Standing: Poor -    ACTIVITY TOLERANCE           BP: 115/49  BP Location: Left arm  BP Method: Automatic  Patient Position: Sitting    O2 WALK  Oxygen Therapy  SPO2% on Room Air at Rest: 98      AM-PAC '6-Clicks' INPATIENT SHORT FORM - BASIC MOBILITY  How much difficulty does the patient currently have...  Patient Difficulty: Turning over in bed (including adjusting bedclothes, sheets and blankets)?: None   Patient Difficulty: Sitting down on and standing up from a chair with arms (e.g., wheelchair, bedside commode, etc.): A Lot   Patient Difficulty: Moving from lying on back to sitting on the side of the bed?: A Little   How much help from another person does the patient currently need...   Help from Another: Moving to and from a bed to a chair (including a wheelchair)?: A Lot   Help from Another: Need to walk in hospital room?: A Lot   Help from Another: Climbing 3-5 steps with a railing?: A Lot       AM-PAC Score:  Raw Score: 15   Approx Degree of Impairment: 57.7%   Standardized Score (AM-PAC Scale): 39.45   CMS Modifier (G-Code): CK    FUNCTIONAL ABILITY STATUS  Gait Assessment   Functional Mobility/Gait Assessment  Gait Assistance: Maximum assistance  Distance (ft): 0  Assistive Device: Rolling walker    Skilled Therapy Provided    Bed Mobility:     Supine<>Sit: min A for LE   Sit<>Supine: NT     Transfer Mobility:  Sit<>Stand: mod A with hands on w/c arm  rests   Stand<>Sit: mod A   Gait: Pt took 1 very small step with RW and max A; unable to take step with R LE despite assistance with offloading, weight-shifting.    Therapist's Comments: Pt lying in bed and agreeable to PT. Daughter present.  line used throughout session. Initially attempted sit to stand with RW at lowest height setting but pt unable to fully lift buttocks off bed despite max A. However, once moved wheelchair armrests in position for weight bearing and cued for forward weight shift, pt able to stand with mod A. Once standing, brought RW back to pt for her to use to use to attempt ambulation but pt unable to take more than one small step with L LE. After seated rest, pt stood again and attempted to take steps with hands staying on w/c armrests. Pt  unable to take lizbet steps at that time. Pt transferred sit to stand fully upright 3 times during session and was unable to stand 40-60 sec each time. Pt sitting EOB to eat lunch at end of session - Pt, dtr, and PCT all confirmed that pt has done this several times over the past few days. MD entered room at end of PT session.      THERAPEUTIC EXERCISES  Lower Extremity Alternating marching  Ankle pumps  LAQ     Upper Extremity      Position Sitting     Repetitions   8-10   Sets   1-2     Patient End of Session: In bed;Needs met;Call light within reach;RN aware of session/findings;All patient questions and concerns addressed;Family present    PT Session Time: 32 minutes    Therapeutic Activity: 22 minutes  Therapeutic Exercise: 10 minutes

## 2024-06-24 NOTE — HOME CARE LIAISON
[facility]. Spoke w/ patients son who is agreeable with [facility]. Contact information placed on AVS.

## 2024-06-24 NOTE — CM/SW NOTE
Department  notified of request for NED and HHC, aidin referrals started. Assigned CM/SW to follow up with pt/family on further discharge planning.     Esther Zarate, DSC

## 2024-06-24 NOTE — PROGRESS NOTES
Pt was discharged home with [facility] and dc instructions and follow up appointments.IV and tele an purewick, pts clothes were changed and all dc instructions were discussed with pts daughter. All questions were addressed.

## 2024-06-24 NOTE — PROGRESS NOTES
[facility]   part of [facility]     Hospitalist Progress Note     Anya Bautista Patient Status:  Inpatient    1945 MRN [id number]   Location [facility] 5NW-A Attending Jacy Peoples, DO   Hosp Day # 3 PCP DIAN Hernandez     Chief Complaint: generalized weakness    Subjective:     Sitting on bed, reports pain is chronic and at her baseline. Her daughter at bedside reports limited mobility ongoing for years, but thinks she is more weak than her baseline     Objective:    Review of Systems:   A comprehensive review of systems was completed; pertinent positive and negatives stated in subjective.    Vital signs:  Temp:  [97.6 °F (36.4 °C)-98.3 °F (36.8 °C)] 98.3 °F (36.8 °C)  Pulse:  [54-65] 59  Resp:  [17-23] 17  BP: (113-148)/(35-64) 124/43  SpO2:  [94 %-99 %] 96 %    Physical Exam:    General: No acute distress; Alert; frail/elderly appearing, pale   Respiratory: No wheezes, no rhonchi  Cardiovascular: S1, S2, regular rate and rhythm  Abdomen: Soft, Non-tender, non-distended, positive bowel sounds  Neuro: No new focal deficits.   Extremities: No edema      Diagnostic Data:    Labs:  Recent Labs   Lab 24  1134 24  1728 24  0112 24  0707 24  0807   WBC 6.5  --   --  6.5 6.9   HGB 5.6* 6.7* 7.9* 8.2* 8.5*   MCV 77.4*  --   --  79.4* 82.4   .0  --   --  273.0 323.0       Recent Labs   Lab 24  1134 24  0707   * 76   BUN 31* 19   CREATSERUM 0.90 0.52*   CA 8.6 8.3*   ALB 2.9*  --     142   K 4.4 3.9    114*   CO2 20.0* 20.0*   ALKPHO 102  --    AST 12*  --    ALT 15  --    BILT 0.2  --    TP 6.5  --        Estimated Creatinine Clearance: 77.6 mL/min (A) (based on SCr of 0.52 mg/dL (L)).    No results for input(s): \"TROP\", \"TROPHS\", \"CK\" in the last 168 hours.    No results for input(s): \"PTP\", \"INR\" in the last 168 hours.     Microbiology    Hospital Encounter on 24   1. Urine Culture, Routine     Status: Abnormal     Collection Time: 06/21/24 11:58 AM    Specimen: Urine, clean catch   Result Value Ref Range    Urine Culture >100,000 CFU/ML Escherichia coli (A) N/A       Susceptibility    Escherichia coli -  (no method available)     Ampicillin >=32 Resistant      Ampicillin + Sulbactam >=32 Resistant      Cefazolin 16 Sensitive      Ciprofloxacin <=0.25 Sensitive      Gentamicin <=1 Sensitive      Meropenem <=0.25 Sensitive      Levofloxacin <=0.12 Sensitive      Nitrofurantoin 32 Sensitive      Piperacillin + Tazobactam 16 Sensitive      Trimethoprim/Sulfa >=320 Resistant    2. Blood Culture     Status: None (Preliminary result)    Collection Time: 06/21/24 11:58 AM    Specimen: Blood,peripheral   Result Value Ref Range    Blood Culture Result No Growth 2 Days N/A         Imaging: Reviewed in Epic.    Medications:    iron sucrose  200 mg Intravenous Daily    atorvastatin  80 mg Oral Nightly    amLODIPine  2.5 mg Oral Daily    gabapentin  100 mg Oral TID    tamsulosin  0.4 mg Oral Daily @ 0700    sodium ferric gluconate  125 mg Intravenous Daily    folic acid  1 mg Oral Daily    pantoprazole  40 mg Oral QAM AC       Assessment & Plan:      #Acute on chronic blood loss anemia, unclear etiology. HDS on presentation suggesting chronic, microscopic losses  #Severe iron deficiency  - pt refused colonosocpy  - s/p EGD on 6/23 > hiatal hernia, no e/o bleeding  - iron studies ordered and reviewed  - IV iron while inpatient   - transfused 2u PRBC's in ED  - PO PPI daily  - plavix discontinued   - tolerating aspirin  - GI signed off     #HTN  - resume lisinopril, metoprolol when BP's consistently improved     #HLD  - statin     #PAD  - cardiology consulted for plavix review  - plavix discontinued  - tolerating aspirin  - statin  - echo unremarkable      #Hx of leukoctyoclastic vasculitis      #MGUS    #Chronic pain  #Inability to ambulate  - norco  - PT/OT > NED Peoples DO    Supplementary Documentation:      Quality:  DVT Mechanical Prophylaxis:   SCDs,    DVT Pharmacologic Prophylaxis   Medication   None                Code Status: Full Code  [place]: No urinary catheter in place  [place] Duration (in days):   Central line:    ERIS: 6/23/2024    Discharge is dependent on: placement  At this point Ms. Bautista is expected to be discharge to: [facility]    The 21st Century Cures Act makes medical notes like these available to patients in the interest of transparency. Please be advised this is a medical document. Medical documents are intended to carry relevant information, facts as evident, and the clinical opinion of the practitioner. The medical note is intended as peer to peer communication and may appear blunt or direct. It is written in medical language and may contain abbreviations or verbiage that are unfamiliar.             **Certification      PHYSICIAN Certification of Need for Inpatient Hospitalization - Initial Certification    Patient will require inpatient services that will reasonably be expected to span two midnight's based on the clinical documentation in H+P.   Based on patients current state of illness, I anticipate that, after discharge, patient will require TBD.

## 2024-06-24 NOTE — CM/SW NOTE
06/24/24 1200   CM/SW Referral Data   Referral Source Social Work (self-referral)   Reason for Referral Discharge planning   Informant Patient;Daughter;EMR;Clinical Staff Member      Reason for  Pt. is Limited English Proficient   Limited English- Service Selected Telephone    Name/ID Armaan [id number]    utilized for: Discharge planning;Completion of assessments and reassessments   Patient Info   Patient's Current Mental Status at Time of Assessment Alert;Oriented   Patient Communication Issues Language barrier   Patient's Home Environment House   Patient lives with Son;Daughter   Discharge Needs   Anticipated D/C needs Subacute rehab;Home health care   Choice of Post-Acute Provider   Informed patient of right to choose their preferred provider Yes   List of appropriate post-acute services provided to patient/family with quality data Yes   Patient/family choice [facility]   Information given to Patient;Daughter   Patient declines recommended services Yes     HOME SITUATION  Type of Home: House   Home Layout: Two level;Able to live on main level     Lives With: Daughter;Son  Patient Owned Equipment: Rolling walker     Prior Level of [place]: Pt reports ambulatory with RW short distances only.  Pt uses transport chair in the community.  Family able to assist.  Pt reports stays on 1st level of home only.   (Per PT evaluation)      Patient is an 80 y/o female who admitted with Weakness generalized, Symptomatic anemia.  Initial anticipated therapy need on 6/22 states home and as of today therapy anticipating benefit from: Gradual Rehabilitative Therapy    Met with patient and daughter (Tony) at bedside to discuss discharge planning. Utilized : Armaan ID # [id number]. Patient lives in a house with her daughter and son. They help her with any assistance needed. Sometimes she uses RW to ambulate but mainly uses transport chair/wheelchair. Discussed discharge  options of NED vs HH. Explained benefits of NED. They prefer to discharge home rather than NED. Discussed HH and provided accepting HH list. They were agreeable with [facility]. Offered additional assistance such as CG or SS, they declined. Added resources to AVS.    SW/CM to remain available for support and/or discharge planning.    Gregoria Lozoya, [facility]  Discharge Planner  [phone]

## 2024-06-24 NOTE — PAYOR COMM NOTE
--------------  DISCHARGE REVIEW    Payor: [facility]  Subscriber #:  [id number]  Authorization Number: [id number]    Admit date: 6/21/24  Admit time:   4:47 PM  Discharge Date: 6/24/2024  1:41 PM      Discharge Summary Notes    No notes of this type exist for this encounter.       HOME with [facility]

## 2024-06-24 NOTE — PLAN OF CARE
Problem: Patient/Family Goals  Goal: Patient/Family Long Term Goal  Description: Patient's Long Term Goal: discharge home with appropriate means    Interventions:  - consults  -blood transfusion  PT/OT eval  - See additional Care Plan goals for specific interventions  Outcome: Progressing  Goal: Patient/Family Short Term Goal  Description: Patient's Short Term Goal:   6/22am: rest comfortably   6:22 noc: EGD in am  6/23am: discharge planning     Interventions:   - cluster care  - meds per MAR   - See additional Care Plan goals for specific interventions  Outcome: Progressing     Problem: SAFETY ADULT - FALL  Goal: Free from fall injury  Description: INTERVENTIONS:  - Assess pt frequently for physical needs  - Identify cognitive and physical deficits and behaviors that affect risk of falls.  - [place] fall precautions as indicated by assessment.  - Educate pt/family on patient safety including physical limitations  - Instruct pt to call for assistance with activity based on assessment  - Modify environment to reduce risk of injury  - Provide assistive devices as appropriate  - Consider OT/PT consult to assist with strengthening/mobility  - Encourage toileting schedule  Outcome: Progressing     Problem: CARDIOVASCULAR - ADULT  Goal: Maintains optimal cardiac output and hemodynamic stability  Description: INTERVENTIONS:  - Monitor vital signs, rhythm, and trends  - Monitor for bleeding, hypotension and signs of decreased cardiac output  - Evaluate effectiveness of vasoactive medications to optimize hemodynamic stability  - Monitor arterial and/or venous puncture sites for bleeding and/or hematoma  - Assess quality of pulses, skin color and temperature  - Assess for signs of decreased coronary artery perfusion - ex. Angina  - Evaluate fluid balance, assess for edema, trend weights  Outcome: Progressing     Problem: HEMATOLOGIC - ADULT  Goal: Maintains hematologic stability  Description: INTERVENTIONS  - Assess for  signs and symptoms of bleeding or hemorrhage  - Monitor labs and vital signs for trends  - Administer supportive blood products/factors, fluids and medications as ordered and appropriate  - Administer supportive blood products/factors as ordered and appropriate  Outcome: Progressing   Dx: anemia, weakness  Plan of care: IV fluids, PT/OT, s/p transfusions  Patient communicates understanding, no pain. DC planning to NED

## 2024-06-26 NOTE — DISCHARGE SUMMARY
[facility]     Anya Bautista Patient Status:  Inpatient    1945 MRN [id number]   Location [facility] 5NW-A Attending No att. providers found   Hosp Day # 3 PCP DIAN Hernandez     Date of Admission: 2024  Date of Discharge:  2024   Discharge Disposition: Home or Self Care    Discharge Diagnosis:  #Acute on chronic blood loss anemia, unclear etiology. HDS on presentation suggesting chronic, microscopic losses  #Severe iron deficiency  #HTN  #HLD  #PAD  #Hx of leukoctyoclastic vasculitis   #MGUS  #Chronic pain  #Inability to ambulate    History of Present Illness: Anya Bautista is an 79 year old female with pmhx of HTN, HLD, anemia who presents with complaint of progressive generalized weakness and fatigue. She and her family at bedside report that she has been having ongoing lightheadedness/dizziness, tingling in face, BL/UE over the last few weeks associated with generalized weakness, fatigue. No generalized abdominal pain (although, she thinks she may have some very mild LLQ pain over last several days), or  dark colored stools, n/v/d, fevers/chills, melena/hematochezia, hematemesis, easy bruising, or rashes. She has history of iron deficiency anemia and has required iron infusion and blood transfusions in the past. They report that she had endsocopy done a few years ago in similar situation and report \"normal\" results with no e/o bleeding found at that time.     Brief Synopsis: admitted with acute on chronic anemia. Iron studies c/w severe iron deficiency. Pt was transfused PRBC's and started on course of IV iron with improvement in her Hgb. GI was consulted and pt underwent EGD on  > hiatal hernia, no e/o bleeding. Pt refused colonosocpy. She showed no e/o active bleeding during her admission. Cardiology was consulted and her cardiac medication regimen was optimized. Her PTA plavix was discontinued. She and her family declined discharge to [facility] and  returned home with  services. She was advised to f/u closely with PCP and GI in outpt setting.       Lace+ Score: 57  59-90 High Risk  29-58 Medium Risk  0-28   Low Risk       TCM Follow-Up Recommendation:  LACE 29-58: Moderate Risk of readmission after discharge from the hospital.      Procedures during hospitalization:   EGD > hiatal hernia, no e/o bleeding    Incidental or significant findings and recommendations (brief descriptions):  As above    Lab/Test results pending at Discharge:   none    Consultants:  GI    Discharge Medication List:     Discharge Medications        CHANGE how you take these medications        Instructions Prescription details   amLODIPine 2.5 MG Tabs  Commonly known as: Norvasc  What changed:   medication strength  how much to take      Take 1 tablet (2.5 mg total) by mouth daily.   Quantity: 30 tablet  Refills: 3     oxyCODONE-acetaminophen 5-325 MG Tabs  Commonly known as: Percocet  What changed: when to take this      Take 1 tablet by mouth every 8 (eight) hours as needed.   Quantity: 20 tablet  Refills: 0            CONTINUE taking these medications        Instructions Prescription details   acetaminophen 325 MG Tabs  Commonly known as: Tylenol      Take 2 tablets (650 mg total) by mouth every 6 (six) hours as needed for Pain.   Refills: 0     alfuzosin ER 10 MG Tb24  Commonly known as: Uroxatral ER      Take 1 tablet (10 mg total) by mouth daily with breakfast.   Quantity: 14 tablet  Refills: 0     aspirin 81 MG Tabs      Take 1 tablet (81 mg total) by mouth daily.   Refills: 0     atorvastatin 80 MG Tabs  Commonly known as: Lipitor      Take 1 tablet (80 mg total) by mouth nightly.   Refills: 0     collagenase 250 UNIT/GM Oint  Commonly known as: Santyl      Apply 1 Application topically daily. Apply to the dark areas of R and L lower extremities as directed by wound care RN   Quantity: 90 g  Refills: 0     docusate sodium 100 MG Caps  Commonly known as: COLACE      Take 100 mg  by mouth 2 (two) times daily.   Quantity: 60 capsule  Refills: 0     ferrous sulfate 325 (65 FE) MG Tbec      Take 1 tablet (325 mg total) by mouth daily with breakfast.   Refills: 0     folic acid 1 MG Tabs  Commonly known as: Folvite      Take 1 tablet (1 mg total) by mouth daily.   Quantity: 30 tablet  Refills: 0     gabapentin 100 MG Caps  Commonly known as: Neurontin      Take 1 capsule (100 mg total) by mouth 3 (three) times daily.   Refills: 0     hydrocortisone 25 MG Supp  Commonly known as: Anusol-HC      Place 1 suppository (25 mg total) rectally 2 (two) times daily as needed for Hemorrhoids.   Quantity: 12 suppository  Refills: 0     metFORMIN HCl ER (OSM) 500 MG (OSM) Tb24  Commonly known as: FORTAMET      Take 1 tablet (500 mg total) by mouth daily with breakfast.   Refills: 0     metoprolol tartrate 25 MG Tabs  Commonly known as: Lopressor      Take 0.5 tablets (12.5 mg total) by mouth 2x Daily(Beta Blocker).   Quantity: 60 tablet  Refills: 0     multivitamin Tabs      Take 1 tablet by mouth daily.   Quantity: 30 tablet  Refills: 0     omeprazole 20 MG Cpdr  Commonly known as: PriLOSEC      Take 1 capsule (20 mg total) by mouth daily as needed.   Refills: 0     pantoprazole 40 MG Tbec  Commonly known as: Protonix      Take 1 tablet (40 mg total) by mouth every morning before breakfast.   Quantity: 30 tablet  Refills: 0     sennosides 8.6 MG Tabs  Commonly known as: Senokot      Take 1 tablet (8.6 mg total) by mouth daily as needed (constipation).   Quantity: 30 tablet  Refills: 0            STOP taking these medications      clopidogrel 75 MG Tabs  Commonly known as: Plavix        hydroCHLOROthiazide 25 MG Tabs        lisinopril 30 MG Tabs                  Where to Get Your Medications        These medications were sent to [facility] [address] [place] [address] - [address] [facility] [phone], [phone] [address] [facility] [address]      Phone: [phone]   amLODIPine 2.5 MG  Tabs  oxyCODONE-acetaminophen 5-325 MG Tabs         [place]    Follow-up appointment:   Jeyson Kessler MD  [address] [facility]  [address] [facility] [id number] [phone]    Follow up in 1 week(s)  office will call to schedule follow up    Lubna Arshad, APRN  [address] [facility] [id number] [phone]    Follow up in 1 week(s)  Follow up    Koby Ortzi DO  [address] [place] [address], [address]  [facility] [id number] [phone]    Schedule an appointment as soon as possible for a visit      Appointments for Next 30 Days 2024 - 2024      None            Vital signs:   /49 (BP Location: Left arm)   Pulse 59   Temp 98.3 °F (36.8 °C) (Oral)   Resp 17   Ht 5' 5\" (1.651 m)   Wt 150 lb (68 kg)   SpO2 96%   BMI 24.96 kg/m²       Physical Exam:    General: No acute distress   Lungs: clear to auscultation  Cardiovascular: S1, S2  Abdomen: Soft      -----------------------------------------------------------------------------------------------  PATIENT DISCHARGE INSTRUCTIONS: See electronic chart    Jacy Peoples DO    Total time spent on discharge plannin minutes     The  [place] Cures Act makes medical notes like these available to patients in the interest of transparency. Please be advised this is a medical document. Medical documents are intended to carry relevant information, facts as evident, and the clinical opinion of the practitioner. The medical note is intended as peer to peer communication and may appear blunt or direct. It is written in medical language and may contain abbreviations or verbiage that are unfamiliar.

## 2024-12-22 NOTE — DISCHARGE INSTRUCTIONS
Sometimes managing your health at home requires assistance.  The [facility] team has recognized your preference to use [facility].  They can be reached by phone at [phone].  The fax number for your reference is [phone].  A representative from the home health agency will contact you or your family to schedule your first visit.        [facility] - please call to see if you qualify for additional assistance at home  [address] [place] [id number] [phone]  [email or web address]     [id number]

## (undated) DEVICE — 3M™ IOBAN™ 2 ANTIMICROBIAL INCISE DRAPE 6650EZ: Brand: IOBAN™ 2

## (undated) DEVICE — KENDALL SCD EXPRESS SLEEVES, KNEE LENGTH, MEDIUM: Brand: KENDALL SCD

## (undated) DEVICE — SUTURE VICRYL 1 OS-6

## (undated) DEVICE — SOL  .9 1000ML BTL

## (undated) DEVICE — GAMMEX® PI HYBRID SIZE 8.5, STERILE POWDER-FREE SURGICAL GLOVE, POLYISOPRENE AND NEOPRENE BLEND: Brand: GAMMEX

## (undated) DEVICE — KIT CUSTOM ENDOPROCEDURE STERIS

## (undated) DEVICE — GOWN,SIRUS,FABRIC-REINFORCED,X-LARGE: Brand: MEDLINE

## (undated) DEVICE — 3M™ STERI-DRAPE™ U-DRAPE 1015: Brand: STERI-DRAPE™

## (undated) DEVICE — BITEBLOCK ENDOSCP 60FR MAXI STRP

## (undated) DEVICE — ENDOSCOPY PACK - LOWER: Brand: MEDLINE INDUSTRIES, INC.

## (undated) DEVICE — FAN SPRAY KIT: Brand: PULSAVAC®

## (undated) DEVICE — 3M™ RED DOT™ MONITORING ELECTRODE WITH FOAM TAPE AND STICKY GEL 2570-3, 3/BAG, 200/CASE, 54/PLT: Brand: RED DOT™

## (undated) DEVICE — PREMIUM WET SKIN PREP TRAY: Brand: MEDLINE INDUSTRIES, INC.

## (undated) DEVICE — OCCLUSIVE GAUZE STRIP OVERWRAP,3% BISMUTH TRIBROMOPHENATE IN PETROLATUM BLEND: Brand: XEROFORM

## (undated) DEVICE — 3M™ RED DOT™ MONITORING ELECTRODE WITH FOAM TAPE AND STICKY GEL, 50/BAG, 20/CASE, 72/PLT 2570: Brand: RED DOT™

## (undated) DEVICE — LOWER EXTREMITY CDS-LF: Brand: MEDLINE INDUSTRIES, INC.

## (undated) DEVICE — 1200CC GUARDIAN II: Brand: GUARDIAN

## (undated) DEVICE — REM POLYHESIVE ADULT PATIENT RETURN ELECTRODE: Brand: VALLEYLAB

## (undated) DEVICE — PADDING CAST SOFT ROLL 4\"

## (undated) DEVICE — STERILE POLYISOPRENE POWDER-FREE SURGICAL GLOVES: Brand: PROTEXIS

## (undated) DEVICE — GAUZE SPONGES,12 PLY: Brand: CURITY

## (undated) DEVICE — GAUZE SPONGES,8 PLY: Brand: CURITY

## (undated) DEVICE — STOCK ORTH TUB 72X8IN NLTX

## (undated) DEVICE — FILTERLINE NASAL ADULT O2/CO2

## (undated) DEVICE — 10FT COMBINED O2 DELIVERY/CO2 MONITORING. FILTER WITH MICROSTREAM TYPE LUER: Brand: DUAL ADULT NASAL CANNULA

## (undated) DEVICE — ENDOSCOPY PACK UPPER: Brand: MEDLINE INDUSTRIES, INC.

## (undated) DEVICE — SUTURE VICRYL 0 CT-1

## (undated) DEVICE — Device: Brand: DEFENDO AIR/WATER/SUCTION AND BIOPSY VALVE

## (undated) DEVICE — PROXIMATE RH ROTATING HEAD SKIN STAPLERS (35 WIDE) CONTAINS 35 STAINLESS STEEL STAPLES: Brand: PROXIMATE

## (undated) DEVICE — KIT VLV 5 PC AIR H2O SUCT BX ENDOGATOR CONN

## (undated) NOTE — LETTER
[facility] [address], [address] [place] [address]    Consent for Anesthesia   1.   IEkaterina agree to be cared for by a physician anesthesiologist alone and/or with a nurse anesthetist, who is specially trained to monitor me and give me m allergic reactions to medications, injury to my airway, heart, lungs, vision, nerves, or muscles and in extremely rare instances death. 5. My doctor has explained to me other choices available to me for my care (alternatives).   6. Pregnant Patients (“epid Printed: 11/14/2019 at 1:18 PM    Medical Record #: [id number]                                            Page 1 of 1

## (undated) NOTE — LETTER
[facility] [address], [address]  Consent for Procedure/Sedation    Date: 11/14/19    Time: 1900      1.  I authorize the performance upon Arnol Warren the following: Peripheral angiography, atherectomy, percutaneous translumina 8. In the event your procedure results in extended X-Ray/fluoroscopy time, you may develop a skin reaction.       Signature of Patient: _______________________________________________________    Signature of person authorized

## (undated) NOTE — LETTER
Irma Berry [address] [facility] [address], [address] [facility]    Consent for Operation  Date: _11/14/19_______                                Time: _1045___    1. I authorize the performance upon Josesito Pratt the following operation:  Procedure(s): 2. procedure has been videotaped, the surgeon will obtain the original videotape. The hospital will not be responsible for storage or maintenance of this tape.   7. For the purpose of advancing medical education, I consent to the admittance of observers to the STATEMENTS REQUIRING INSERTION OR COMPLETION WERE FILLED IN.     Signature of Patient:   ___________________________    When the patient is a minor or mentally incompetent to give consent:  Signature of person authorized to consent for patient: ____________ supplements, and pills I can buy without a prescription (including street drugs/illegal medications). Failure to inform my anesthesiologist about these medicines may increase my risk of anesthetic complications. iv.  If I am allergic to anything or have ha Anesthesiologist Signature     Date   Time  I have discussed the procedure and information above with the patient (or patient’s representative) and answered their questions. The patient or their representative has agreed to have anesthesia services.     ___

## (undated) NOTE — LETTER
[facility] [address], [address] [place] [address]    Consent for Anesthesia   1.   ICira agree to be cared for by a physician anesthesiologist alone and/or with a nurse anesthetist, who is specially trained to monitor me and give me m allergic reactions to medications, injury to my airway, heart, lungs, vision, nerves, or muscles and in extremely rare instances death. 5. My doctor has explained to me other choices available to me for my care (alternatives).   6. Pregnant Patients (“epid Printed: 11/13/2019 at 2:57 PM    Medical Record #: [id number]                                            Page 1 of 1

## (undated) NOTE — LETTER
Irma Berry 182 [address] [address]  [place], [address] [facility]    Consent for Operation  Date: __________________                                Time: _______________    1.  I authorize the performance upon Sameera Passer the following operation:  Alaina Theodore procedure has been videotaped, the surgeon will obtain the original videotape. The hospital will not be responsible for storage or maintenance of this tape.   6. For the purpose of advancing medical education, I consent to the admittance of observers to the STATEMENTS REQUIRING INSERTION OR COMPLETION WERE FILLED IN.     Signature of Patient:   ___________________________    When the patient is a minor or mentally incompetent to give consent:  Signature of person authorized to consent for patient: ____________ supplements, and pills I can buy without a prescription (including street drugs/illegal medications). Failure to inform my anesthesiologist about these medicines may increase my risk of anesthetic complications. iv.  If I am allergic to anything or have ha Anesthesiologist Signature     Date   Time  I have discussed the procedure and information above with the patient (or patient’s representative) and answered their questions. The patient or their representative has agreed to have anesthesia services.     ___

## (undated) NOTE — LETTER
Irma Berry 182 [address] [address]  [place], [address] [place] [address]    Consent for Operation  Date: __________________                                Time: _______________    1.  I authorize the performance upon Pablitoelma Chen the following operation:  Procedure( procedure has been videotaped, the surgeon will obtain the original videotape. The hospital will not be responsible for storage or maintenance of this tape.   7. For the purpose of advancing medical education, I consent to the admittance of observers to the STATEMENTS REQUIRING INSERTION OR COMPLETION WERE FILLED IN.     Signature of Patient:   ___________________________    When the patient is a minor or mentally incompetent to give consent:  Signature of person authorized to consent for patient: ____________ supplements, and pills I can buy without a prescription (including street drugs/illegal medications). Failure to inform my anesthesiologist about these medicines may increase my risk of anesthetic complications. iv.  If I am allergic to anything or have ha Anesthesiologist Signature     Date   Time  I have discussed the procedure and information above with the patient (or patient’s representative) and answered their questions. The patient or their representative has agreed to have anesthesia services.     ___

## (undated) NOTE — IP AVS SNAPSHOT
[address]            (For Outpatient Use Only) Initial Admit Date: 11/2/2019   Inpt/Obs Admit Date: Inpt: 11/2/19 / Obs: N/A   Discharge Date:    Shoshana Later:  [de-identified]   MRN: [de-identified]   CSN: [id number]   CEID: [id number] Hospital Account Financial Class: Medicare Advantage    November 27, 2019

## (undated) NOTE — LETTER
Irma Berry 182 [address] [address]  [place], [address] [facility]    Consent for Operation  Date: __________________                                Time: _______________    1.  I authorize the performance upon Akira Mcnamara the following operation:  Procedure( procedure has been videotaped, the surgeon will obtain the original videotape. The hospital will not be responsible for storage or maintenance of this tape.   7. For the purpose of advancing medical education, I consent to the admittance of observers to the STATEMENTS REQUIRING INSERTION OR COMPLETION WERE FILLED IN.     Signature of Patient:   ___________________________    When the patient is a minor or mentally incompetent to give consent:  Signature of person authorized to consent for patient: ____________ supplements, and pills I can buy without a prescription (including street drugs/illegal medications). Failure to inform my anesthesiologist about these medicines may increase my risk of anesthetic complications. iv.  If I am allergic to anything or have ha Anesthesiologist Signature     Date   Time  I have discussed the procedure and information above with the patient (or patient’s representative) and answered their questions. The patient or their representative has agreed to have anesthesia services.     ___

## (undated) NOTE — LETTER
[address]  [address]  Authorization for Surgical Operation and Procedure     Date:___________                                                                                                         Time:__________  I hereby authorize Surgeon(s):  Koby Ortiz DO, my physician and his/her assistants (if applicable), which may include medical students, residents, and/or fellows, to perform the following surgical operation/ procedure and administer such anesthesia as may be determined necessary by my physician:  Operation/Procedure name (s) Procedure(s):  ESOPHAGOGASTRODUODENOSCOPY (EGD)  COLONOSCOPY on Anya Bautista   2.   I recognize that during the surgical operation/procedure, unforeseen conditions may necessitate additional or different procedures than those listed above.  I, therefore, further authorize and request that the above-named surgeon, assistants, or designees perform such procedures as are, in their judgment, necessary and desirable.    3.   My surgeon/physician has discussed prior to my surgery the potential benefits, risks and side effects of this procedure; the likelihood of achieving goals; and potential problems that might occur during recuperation.  They also discussed reasonable alternatives to the procedure, including risks, benefits, and side effects related to the alternatives and risks related to not receiving this procedure.  I have had all my questions answered and I acknowledge that no guarantee has been made as to the result that may be obtained.    4.   Should the need arise during my operation/procedure, which includes change of level of care prior to discharge, I also consent to the administration of blood and/or blood products.  Further, I understand that despite careful testing and screening of blood or blood products by collecting agencies, I may still be subject to ill effects as a result of receiving a blood transfusion and/or blood  products.  The following are some, but not all, of the potential risks that can occur: fever and allergic reactions, hemolytic reactions, transmission of diseases such as Hepatitis, AIDS and Cytomegalovirus (CMV) and fluid overload.  In the event that I wish to have an autologous transfusion of my own blood, or a directed donor transfusion, I will discuss this with my physician.  Check only if Refusing Blood or Blood Products  I understand refusal of blood or blood products as deemed necessary by my physician may have serious consequences to my condition to include possible death. I hereby assume responsibility for my refusal and release the hospital, its personnel, and my physicians from any responsibility for the consequences of my refusal.          o  Refuse      5.   I authorize the use of any specimen, organs, tissues, body parts or foreign objects that may be removed from my body during the operation/procedure for diagnosis, research or teaching purposes and their subsequent disposal by hospital authorities.  I also authorize the release of specimen test results and/or written reports to my treating physician on the hospital medical staff or other referring or consulting physicians involved in my care, at the discretion of the Pathologist or my treating physician.    6.   I consent to the photographing or videotaping of the operations or procedures to be performed, including appropriate portions of my body for medical, scientific, or educational purposes, provided my identity is not revealed by the pictures or by descriptive texts accompanying them.  If the procedure has been photographed/videotaped, the surgeon will obtain the original picture, image, videotape or CD.  The hospital will not be responsible for storage, release or maintenance of the picture, image, tape or CD.    7.   I consent to the presence of a  or observers in the operating room as deemed necessary by my physician or  their designees.    8.   I recognize that in the event my procedure results in extended X-Ray/fluoroscopy time, I may develop a skin reaction.    9. If I have a Do Not Attempt Resuscitation (DNAR) order in place, that status will be suspended while in the operating room, procedural suite, and during the recovery period unless otherwise explicitly stated by me (or a person authorized to consent on my behalf). The surgeon or my attending physician will determine when the applicable recovery period ends for purposes of reinstating the DNAR order.  10. Patients having a sterilization procedure: I understand that if the procedure is successful the results will be permanent and it will therefore be impossible for me to inseminate, conceive, or bear children.  I also understand that the procedure is intended to result in sterility, although the result has not been guaranteed.   11. I acknowledge that my physician has explained sedation/analgesia administration to me including the risk and benefits I consent to the administration of sedation/analgesia as may be necessary or desirable in the judgment of my physician.    I CERTIFY THAT I HAVE READ AND FULLY UNDERSTAND THE ABOVE CONSENT TO OPERATION and/or OTHER PROCEDURE.    _________________________________________  __________________________________  Signature of Patient     Signature of Responsible Person         ___________________________________         Printed Name of Responsible Person           _________________________________                 Relationship to Patient  _________________________________________  ______________________________  Signature of Witness          Date  Time      Patient Name: Anya Bautista     : 1945                 Printed: 2024     Medical Record #: [id number]                     Page 1 of [address] [place] [address] [place]  [address]    Consent for Anesthesia    IAnya  Michele agree to be cared for by an anesthesiologist, who is specially trained to monitor me and give me medicine to put me to sleep or keep me comfortable during my procedure    I understand that my anesthesiologist is not an employee or agent of [facility] or [employer]. He or she works for [employer].    As the patient asking for anesthesia services, I agree to:  Allow the anesthesiologist (anesthesia doctor) to give me medicine and do additional procedures as necessary. Some examples are: Starting or using an “IV” to give me medicine, fluids or blood during my procedure, and having a breathing tube placed to help me breathe when I’m asleep (intubation). In the event that my heart stops working properly, I understand that my anesthesiologist will make every effort to sustain my life, unless otherwise directed by [facility] Do Not Resuscitate documents.  Tell my anesthesia doctor before my procedure:  If I am pregnant.  The last time that I ate or drank.  All of the medicines I take (including prescriptions, herbal supplements, and pills I can buy without a prescription (including street drugs/illegal medications). Failure to inform my anesthesiologist about these medicines may increase my risk of anesthetic complications.  If I am allergic to anything or have had a reaction to anesthesia before.  I understand how the anesthesia medicine will help me (benefits).  I understand that with any type of anesthesia medicine there are risks:  The most common risks are: nausea, vomiting, sore throat, muscle soreness, damage to my eyes, mouth, or teeth (from breathing tube placement).  Rare risks include: remembering what happened during my procedure, allergic reactions to medications, injury to my airway, heart, lungs, vision, nerves, or muscles and in extremely rare instances death.  My doctor has explained to me other choices available to me for my care (alternatives).  Pregnant  Patients (“epidural”):  I understand that the risks of having an epidural (medicine given into my back to help control pain during labor), include itching, low blood pressure, difficulty urinating, headache or slowing of the baby’s heart. Very rare risks include infection, bleeding, seizure, irregular heart rhythms and nerve injury.  Regional Anesthesia (“spinal”, “epidural”, & “nerve blocks”):  I understand that rare but potential complications include headache, bleeding, infection, seizure, irregular heart rhythms, and nerve injury.    I can change my mind about having anesthesia services at any time before I get the medicine.    _____________________________________________________________________________  Patient (or Representative) Signature/Relationship to Patient  Date   Time    _____________________________________________________________________________   Name (if used)    Language/Organization   Time    _____________________________________________________________________________  Anesthesiologist Signature     Date   Time  I have discussed the procedure and information above with the patient (or patient’s representative) and answered their questions. The patient or their representative has agreed to have anesthesia services.    _____________________________________________________________________________  Witness        Date   Time  I have verified that the signature is that of the patient or patient’s representative, and that it was signed before the procedure  Patient Name: Anya Bautista     : 1945                 Printed: 2024     Medical Record #: [id number]                     Page 2 of 2

## (undated) NOTE — IP AVS SNAPSHOT
Patient Demographics     Address  [address] [place] [address]  Nicky Eubanks [address] Phone  [phone] [facility]  [phone] [facility]      Emergency Contact(s)     Name Relation Home Work Eb [place] Son   [phone]    Jonah Arias Daughter [phone] [address] Charli Whaley MD.    Specialties:  Sherly Pitts, Internal Medicine  Contact information:  [address] [address] [phone] RUDY Santamaria         metoprolol Tartrate 25 MG Tabs  Commonly known as:  LOPRESSOR      Take 0.5 tablets (12.5 mg total) by mouth 2x Daily(Beta Blocker).    RUDY Santamaria         multivitamin Tabs  Start taking on:  November 28, 2019      Take 5692-0094-D - MAR ACTION REPORT  (last 24 hrs)    ** SITE UNKNOWN **     Order ID Medication Name Action Time Action Reason Comments    [id number] Alfuzosin HCl ER (UROXATRAL) 24 hr tab 10 mg 11/27/19 0803 Given      [id number] Clopidogrel Bisulfate (PLAVIX) CYCLIC CITRULLINATE PEP.  IGG [[id number]] (Normal)  Resulted: 11/27/19 1127, Result status: Final result   Ordering provider:  Cali Mohan DO  11/22/19 0902 Resulting lab:  [facility]    Specimen Information    Type Source Collected On   Blood — 11/19/ CREATININE, SERUM [[id number]] (Abnormal)  Resulted: 11/27/19 0718, Result status: Final result   Ordering provider:  Leann Guzman MD  11/26/19 6317 Resulting lab:  [facility]    Specimen Information    Type Source Collected On   Blood — 11/27/19 0676 Final result   Ordering provider:  Chanel Louie MD  11/26/19 2609 Resulting lab:  [facility]    Specimen Information    Type Source Collected On   Blood — 11/27/19 1788          Components    Component Value Reference Range Flag Lab   Ferritin 369 The sample is examined daily for the presence or absence of   cryoglobulin. Test developed and characteristics determined by [facility].  See Compliance Statement B: [email or web address]  Performed by Reema Rebolledo , [id number] [facility] SPE Interpretation Monoclonal spike in the gamma region. Reviewed by Betty Orr M.D.  Pathology 11/26/19 at 4:45 PM   — — [facility]            IMMUNOFIXATION [ Nelly Costa [[id number]]  Resulted: 11/26/19 1647, Result status: Final result   O Blood Culture FREQ X 2 [[id number]]  (Abnormal)  (Susceptibility) Collected:  11/02/19 2007    Order Status:  Completed Lab Status:  Final result Updated:  11/08/19 0735    Specimen:  Blood,peripheral      Blood Culture Result Streptococcus Group C     Blo Order Status:  Completed Lab Status:  Final result Updated:  11/06/19 1121    Specimen:  Other from Leg, right      Aerobic Culture Result Mixture of organisms suggestive of normal skin lionel      3+ growth Streptococcus Group C      2+ growth Staphylococ ambulatory for approximately 3 to 6 months. She denies any true claudication, numbness, tingling, weakness, rest pain. She denies any fevers, chills, chest pain, shortness of breath, palpitation, syncope. She does not smoke.     Past Medical History:   D Respiratory: Denies cough, wheezing or shortness of breath. CV: Denies chest pain, palpitations, orthopnea, PND or dizziness. Musculoskeletal: No joint pain, stiffness or swelling. GI: No nausea, vomiting or diarrhea. No blood in stools.   Neurologic: No LEFT ANKLE/BRACHIAL INDEX:   1.08  LEFT TOE/BRACHIAL INDEX:    0.37       RIGHT EXTREMITY:  Monophasic waveforms right posterior tibial and dorsalis pedis  LEFT EXTREMITY:  Monophasic waveforms left posterior tibial and dorsalis pedis.        Venous reflux: CLOTS:   No clots are visualized in the greater saphenous veins.        :::::LESSER SAPHENOUS VEINS::::::  REFLUX:  Not visualized secondary to skin ulcerations and patient's inability to move calf        :::::Minna Sees:::::::::::::::::  Not seen     === Consults signed by Marisol Roy DO at 11/22/2019  4:00 PM     Author:  Marisol Roy DO Service:  Rheumatology Author Type:  Physician    Filed:  11/22/2019  4:00 PM Date of Service:  11/22/2019  3:36 PM Status:  Signed    :  Marisol Roy DO (Ph review, she was following at wound clinic and going through hyperbaric therapy for her wounds. Since this hospitalization, pt has had multiple vascular studies to evaluate her lesions further.  She has been diagnosed with cellulitisi as well as bacterem [COMPLETED] lisinopril tab 10 mg, 10 mg, Oral, Once  Piperacillin Sod-Tazobactam So (ZOSYN) 3.375 g in dextrose 5 % 100 mL ADD-vantage, 3.375 g, Intravenous, Q8H  collagenase (SANTYL) 250 UNIT/GM ointment, , Topical, Daily  [COMPLETED] Potassium Chloride E [COMPLETED] Potassium Chloride ER (K-DUR M20) CR tab 40 mEq, 40 mEq, Oral, Q4H  [COMPLETED] furosemide (LASIX) injection 20 mg, 20 mg, Intravenous, Once  [COMPLETED] potassium chloride 40 mEq in sodium chloride 0.9% 250 mL IVPB, 40 mEq, Intravenous, Once [COMPLETED] heparin (PORCINE) 81156wcmjz/250mL infusion INITIAL DOSE, 12 Units/kg/hr, Intravenous, Once  Clopidogrel Bisulfate (PLAVIX) tab 75 mg, 75 mg, Oral, Daily  metoprolol tartrate (LOPRESSOR) partial tablet 12.5 mg, 12.5 mg, Oral, 2x Daily(Beta Marsh & Rogelio Abdomen: Soft, non-tender. Extremities: distal legs wrapped, preferred that I not unwrap them. Images under media reviewed from earlier in admission.   No periungal erythema or normal capillaries on nailfold capillaroscopy   Pedal pulses diminished   Neur CONCLUSION:  There is mild reflux at the right saphenofemoral junction. No visualized reflux in the left leg. 3 mm perforated left mid calf without evidence of reflux. US arterial duplex 11/06/2019  CONCLUSION:    1.  In the right leg, no flow is s selective angiography of common femoral, SFA, popliteal, anterior   tibial, posterior tibial and peroneal of both legs  Date of Procedure: 11/15/2019   Indication: Peripheral arterial disease    Peripheral angiography:  -Aortogram with bilateral runoff dem immunoglobulins as well as myositis panel and antiphospholipid antibodies. Her LAC preliminary test is positive, however can be positive with any heparin exposure which pt has had. Will await other APLs.    Will await biopsy results for further recommendati Session: 6   Number of Visits to Meet Established Goals: (5 additional sessions added for total of 9)    Presenting Problem:  B LE ulcers  History related to current admission: Pt admitted by family with worsening LE wounds + cellulitis & sepsis with unstab How much difficulty does the patient currently have. ..  -   Turning over in bed (including adjusting bedclothes, sheets and blankets)?: A Lot   -   Sitting down on and standing up from a chair with arms (e.g., wheelchair, bedside commode, etc.): A Lot   - decreased safety awareness , decreased balance, gait dysfunction. Functional outcome measures completed include AMPAC.   Pt most limited with mobility secondary to pain  These impairments and comorbidities manifest themselves as functional limitations in ind

## (undated) NOTE — LETTER
[facility] [address], [address]  Consent for Procedure/Sedation    Date: _______    Time: ______      1.  I authorize the performance upon Andria Shay the following: Peripheral angiography, atherectomy, percutaneous translumin 8. In the event your procedure results in extended X-Ray/fluoroscopy time, you may develop a skin reaction.       Signature of Patient: _______________________________________________________    Signature of person authorized